# Patient Record
Sex: FEMALE | Race: WHITE | NOT HISPANIC OR LATINO | Employment: OTHER | URBAN - METROPOLITAN AREA
[De-identification: names, ages, dates, MRNs, and addresses within clinical notes are randomized per-mention and may not be internally consistent; named-entity substitution may affect disease eponyms.]

---

## 2017-06-21 DIAGNOSIS — Z01.812 PRE-OPERATIVE LABORATORY EXAMINATION: ICD-10-CM

## 2017-06-21 DIAGNOSIS — I35.0 AORTIC STENOSIS, MILD: ICD-10-CM

## 2017-06-21 DIAGNOSIS — Z01.810 PRE-OPERATIVE CARDIOVASCULAR EXAMINATION: ICD-10-CM

## 2017-06-21 LAB — EKG IMPRESSION: NORMAL

## 2017-06-21 PROCEDURE — 93010 ELECTROCARDIOGRAM REPORT: CPT | Performed by: INTERNAL MEDICINE

## 2017-06-21 PROCEDURE — 85027 COMPLETE CBC AUTOMATED: CPT

## 2017-06-21 PROCEDURE — 93005 ELECTROCARDIOGRAM TRACING: CPT

## 2017-06-21 PROCEDURE — 36415 COLL VENOUS BLD VENIPUNCTURE: CPT

## 2017-06-21 RX ORDER — LANOLIN ALCOHOL/MO/W.PET/CERES
3000 CREAM (GRAM) TOPICAL DAILY
COMMUNITY
End: 2022-06-13

## 2017-06-21 NOTE — OR NURSING
Preadmit appointment:  Patient instructed to continue regularly prescribed medications through day before surgery.  Instructed to take the following medications the  day of surgery with a sip of water per anesthesia protocol: Abilify, Xanax if needed, Nexium.  Pt instructed to bring CPAP day of surgery

## 2017-06-22 LAB
ERYTHROCYTE [DISTWIDTH] IN BLOOD BY AUTOMATED COUNT: 49.5 FL (ref 35.9–50)
HCT VFR BLD AUTO: 39.5 % (ref 37–47)
HGB BLD-MCNC: 12.6 G/DL (ref 12–16)
MCH RBC QN AUTO: 28 PG (ref 27–33)
MCHC RBC AUTO-ENTMCNC: 31.9 G/DL (ref 33.6–35)
MCV RBC AUTO: 87.8 FL (ref 81.4–97.8)
PLATELET # BLD AUTO: 311 K/UL (ref 164–446)
PMV BLD AUTO: 9.3 FL (ref 9–12.9)
RBC # BLD AUTO: 4.5 M/UL (ref 4.2–5.4)
WBC # BLD AUTO: 5 K/UL (ref 4.8–10.8)

## 2017-06-27 ENCOUNTER — HOSPITAL ENCOUNTER (INPATIENT)
Facility: MEDICAL CENTER | Age: 72
LOS: 1 days | DRG: 908 | End: 2017-06-28
Attending: PLASTIC SURGERY | Admitting: PLASTIC SURGERY
Payer: MEDICARE

## 2017-06-27 PROBLEM — C50.919 MALIGNANT NEOPLASM OF BREAST (FEMALE) (HCC): Status: ACTIVE | Noted: 2017-06-27

## 2017-06-27 PROCEDURE — A9270 NON-COVERED ITEM OR SERVICE: HCPCS

## 2017-06-27 PROCEDURE — 0HRU0JZ REPLACEMENT OF LEFT BREAST WITH SYNTHETIC SUBSTITUTE, OPEN APPROACH: ICD-10-PCS | Performed by: PLASTIC SURGERY

## 2017-06-27 PROCEDURE — 501497 HCHG SURGICLIP: Performed by: PLASTIC SURGERY

## 2017-06-27 PROCEDURE — 160039 HCHG SURGERY MINUTES - EA ADDL 1 MIN LEVEL 3: Performed by: PLASTIC SURGERY

## 2017-06-27 PROCEDURE — C1713 ANCHOR/SCREW BN/BN,TIS/BN: HCPCS | Performed by: PLASTIC SURGERY

## 2017-06-27 PROCEDURE — 87070 CULTURE OTHR SPECIMN AEROBIC: CPT

## 2017-06-27 PROCEDURE — 502000 HCHG MISC OR IMPLANTS RC 0278: Performed by: PLASTIC SURGERY

## 2017-06-27 PROCEDURE — 0HRU37Z REPLACEMENT OF LEFT BREAST WITH AUTOLOGOUS TISSUE SUBSTITUTE, PERCUTANEOUS APPROACH: ICD-10-PCS | Performed by: PLASTIC SURGERY

## 2017-06-27 PROCEDURE — 160028 HCHG SURGERY MINUTES - 1ST 30 MINS LEVEL 3: Performed by: PLASTIC SURGERY

## 2017-06-27 PROCEDURE — 160002 HCHG RECOVERY MINUTES (STAT): Performed by: PLASTIC SURGERY

## 2017-06-27 PROCEDURE — 160009 HCHG ANES TIME/MIN: Performed by: PLASTIC SURGERY

## 2017-06-27 PROCEDURE — 700105 HCHG RX REV CODE 258: Performed by: PLASTIC SURGERY

## 2017-06-27 PROCEDURE — 700102 HCHG RX REV CODE 250 W/ 637 OVERRIDE(OP): Performed by: PLASTIC SURGERY

## 2017-06-27 PROCEDURE — 501445 HCHG STAPLER, SKIN DISP: Performed by: PLASTIC SURGERY

## 2017-06-27 PROCEDURE — 87205 SMEAR GRAM STAIN: CPT

## 2017-06-27 PROCEDURE — 500257: Performed by: PLASTIC SURGERY

## 2017-06-27 PROCEDURE — 500389 HCHG DRAIN, RESERVOIR SUCT JP 100CC: Performed by: PLASTIC SURGERY

## 2017-06-27 PROCEDURE — 700101 HCHG RX REV CODE 250

## 2017-06-27 PROCEDURE — 160035 HCHG PACU - 1ST 60 MINS PHASE I: Performed by: PLASTIC SURGERY

## 2017-06-27 PROCEDURE — 700111 HCHG RX REV CODE 636 W/ 250 OVERRIDE (IP)

## 2017-06-27 PROCEDURE — A6223 GAUZE >16<=48 NO W/SAL W/O B: HCPCS | Performed by: PLASTIC SURGERY

## 2017-06-27 PROCEDURE — 770006 HCHG ROOM/CARE - MED/SURG/GYN SEMI*

## 2017-06-27 PROCEDURE — 160003 HCHG RECOVERY MINUTES (EXTENDED) STAT: Performed by: PLASTIC SURGERY

## 2017-06-27 PROCEDURE — 500772 HCHG KIT, MAMMARY FILL FLUID TRANSFER: Performed by: PLASTIC SURGERY

## 2017-06-27 PROCEDURE — 87075 CULTR BACTERIA EXCEPT BLOOD: CPT

## 2017-06-27 PROCEDURE — 500371 HCHG DRAIN, BLAKE 10MM: Performed by: PLASTIC SURGERY

## 2017-06-27 PROCEDURE — 500423 HCHG DRESSING, ABD COMBINE: Performed by: PLASTIC SURGERY

## 2017-06-27 PROCEDURE — 160048 HCHG OR STATISTICAL LEVEL 1-5: Performed by: PLASTIC SURGERY

## 2017-06-27 PROCEDURE — 502575 HCHG PACK, BREAST: Performed by: PLASTIC SURGERY

## 2017-06-27 PROCEDURE — A9270 NON-COVERED ITEM OR SERVICE: HCPCS | Performed by: PLASTIC SURGERY

## 2017-06-27 PROCEDURE — 500122 HCHG BOVIE, BLADE: Performed by: PLASTIC SURGERY

## 2017-06-27 PROCEDURE — 501838 HCHG SUTURE GENERAL: Performed by: PLASTIC SURGERY

## 2017-06-27 PROCEDURE — 700101 HCHG RX REV CODE 250: Performed by: PLASTIC SURGERY

## 2017-06-27 PROCEDURE — 700102 HCHG RX REV CODE 250 W/ 637 OVERRIDE(OP)

## 2017-06-27 PROCEDURE — 500817 HCHG MAMMARY SIZER: Performed by: PLASTIC SURGERY

## 2017-06-27 PROCEDURE — 0HPU0JZ REMOVAL OF SYNTHETIC SUBSTITUTE FROM LEFT BREAST, OPEN APPROACH: ICD-10-PCS | Performed by: PLASTIC SURGERY

## 2017-06-27 DEVICE — IMPLANTABLE DEVICE: Type: IMPLANTABLE DEVICE | Site: BREAST | Status: FUNCTIONAL

## 2017-06-27 RX ORDER — BACITRACIN 50000 [IU]/1
INJECTION, POWDER, FOR SOLUTION INTRAMUSCULAR
Status: DISCONTINUED | OUTPATIENT
Start: 2017-06-27 | End: 2017-06-27 | Stop reason: HOSPADM

## 2017-06-27 RX ORDER — LIDOCAINE HYDROCHLORIDE 10 MG/ML
INJECTION, SOLUTION INFILTRATION; PERINEURAL
Status: COMPLETED
Start: 2017-06-27 | End: 2017-06-27

## 2017-06-27 RX ORDER — DEXTROSE, SODIUM CHLORIDE, SODIUM LACTATE, POTASSIUM CHLORIDE, AND CALCIUM CHLORIDE 5; .6; .31; .03; .02 G/100ML; G/100ML; G/100ML; G/100ML; G/100ML
INJECTION, SOLUTION INTRAVENOUS CONTINUOUS
Status: DISCONTINUED | OUTPATIENT
Start: 2017-06-27 | End: 2017-06-28 | Stop reason: HOSPADM

## 2017-06-27 RX ORDER — OXYCODONE HCL 5 MG/5 ML
SOLUTION, ORAL ORAL
Status: COMPLETED
Start: 2017-06-27 | End: 2017-06-27

## 2017-06-27 RX ORDER — ONDANSETRON 2 MG/ML
4 INJECTION INTRAMUSCULAR; INTRAVENOUS EVERY 4 HOURS PRN
Status: DISCONTINUED | OUTPATIENT
Start: 2017-06-27 | End: 2017-06-28 | Stop reason: HOSPADM

## 2017-06-27 RX ORDER — ZOLPIDEM TARTRATE 5 MG/1
10 TABLET ORAL NIGHTLY PRN
Status: DISCONTINUED | OUTPATIENT
Start: 2017-06-27 | End: 2017-06-28 | Stop reason: HOSPADM

## 2017-06-27 RX ORDER — OXYCODONE AND ACETAMINOPHEN 7.5; 325 MG/1; MG/1
1 TABLET ORAL EVERY 4 HOURS PRN
Status: DISCONTINUED | OUTPATIENT
Start: 2017-06-27 | End: 2017-06-28 | Stop reason: HOSPADM

## 2017-06-27 RX ORDER — BUPIVACAINE HYDROCHLORIDE 5 MG/ML
INJECTION, SOLUTION EPIDURAL; INTRACAUDAL
Status: DISCONTINUED | OUTPATIENT
Start: 2017-06-27 | End: 2017-06-27 | Stop reason: HOSPADM

## 2017-06-27 RX ORDER — SODIUM CHLORIDE, SODIUM LACTATE, POTASSIUM CHLORIDE, CALCIUM CHLORIDE 600; 310; 30; 20 MG/100ML; MG/100ML; MG/100ML; MG/100ML
INJECTION, SOLUTION INTRAVENOUS
Status: DISCONTINUED | OUTPATIENT
Start: 2017-06-27 | End: 2017-06-28

## 2017-06-27 RX ADMIN — LIDOCAINE HYDROCHLORIDE 0.2 ML: 10 INJECTION, SOLUTION INFILTRATION; PERINEURAL at 11:15

## 2017-06-27 RX ADMIN — OXYCODONE HYDROCHLORIDE AND ACETAMINOPHEN 1 TABLET: 7.5; 325 TABLET ORAL at 21:47

## 2017-06-27 RX ADMIN — SODIUM CHLORIDE, SODIUM LACTATE, POTASSIUM CHLORIDE, CALCIUM CHLORIDE: 600; 310; 30; 20 INJECTION, SOLUTION INTRAVENOUS at 11:20

## 2017-06-27 RX ADMIN — DOXYCYCLINE 100 MG: 100 INJECTION, POWDER, LYOPHILIZED, FOR SOLUTION INTRAVENOUS at 20:26

## 2017-06-27 RX ADMIN — SODIUM CHLORIDE, SODIUM LACTATE, POTASSIUM CHLORIDE, CALCIUM CHLORIDE AND DEXTROSE MONOHYDRATE: 5; 600; 310; 30; 20 INJECTION, SOLUTION INTRAVENOUS at 17:32

## 2017-06-27 RX ADMIN — LIDOCAINE HYDROCHLORIDE 0.2 ML: 10 INJECTION, SOLUTION INFILTRATION; PERINEURAL at 11:20

## 2017-06-27 RX ADMIN — ZOLPIDEM TARTRATE 10 MG: 5 TABLET ORAL at 21:47

## 2017-06-27 RX ADMIN — OXYCODONE HYDROCHLORIDE 10 MG: 5 SOLUTION ORAL at 14:30

## 2017-06-27 RX ADMIN — LIDOCAINE HYDROCHLORIDE 0.2 ML: 10 INJECTION, SOLUTION INFILTRATION; PERINEURAL at 11:16

## 2017-06-27 ASSESSMENT — PAIN SCALES - GENERAL
PAINLEVEL_OUTOF10: 2
PAINLEVEL_OUTOF10: 6
PAINLEVEL_OUTOF10: 4
PAINLEVEL_OUTOF10: 3
PAINLEVEL_OUTOF10: 4
PAINLEVEL_OUTOF10: 3
PAINLEVEL_OUTOF10: 5
PAINLEVEL_OUTOF10: 0
PAINLEVEL_OUTOF10: ASSUMED PAIN PRESENT
PAINLEVEL_OUTOF10: 0
PAINLEVEL_OUTOF10: 4
PAINLEVEL_OUTOF10: 4

## 2017-06-27 NOTE — IP AVS SNAPSHOT
" Home Care Instructions                                                                                                                  Name:Tiffany Louis  Medical Record Number:[id number]  CSN: [id number]    YOB: 1945   Age: 72 y.o.  Sex: female  HT:1.626 m (5' 4\") WT: 64.5 kg (142 lb 3.2 oz)          Admit Date: 6/27/2017     Discharge Date:   Today's Date: 6/28/2017  Attending Doctor:  Johnny Flannery M.D.                  Allergies:  Clindamycin; Levaquin; Penicillins; Ampicillin; and Demerol            Discharge Instructions       Strip drains every 4 hours.  Keep dressing intact  Follow up appointment 6/30    Discharge Instructions    Discharged to home by car with relative. Discharged via wheelchair, hospital escort: Yes.  Special equipment needed: Not Applicable    Be sure to schedule a follow-up appointment with your primary care doctor or any specialists as instructed.     Discharge Plan:   Diet Plan: Discussed  Activity Level: Discussed  Confirmed Follow up Appointment: Patient to Call and Schedule Appointment  Confirmed Symptoms Management: Discussed  Medication Reconciliation Updated: Yes  Influenza Vaccine Indication: Patient Refuses    I understand that a diet low in cholesterol, fat, and sodium is recommended for good health. Unless I have been given specific instructions below for another diet, I accept this instruction as my diet prescription.   Other diet: Regular    Special Instructions: None    · Is patient discharged on Warfarin / Coumadin?   No     · Is patient Post Blood Transfusion?  No    Depression / Suicide Risk    As you are discharged from this [facility] facility, it is important to learn how to keep safe from harming yourself.    Recognize the warning signs:  · Abrupt changes in personality, positive or negative- including increase in energy   · Giving away possessions  · Change in eating patterns- significant weight changes-  positive or negative  · Change in sleeping " patterns- unable to sleep or sleeping all the time   · Unwillingness or inability to communicate  · Depression  · Unusual sadness, discouragement and loneliness  · Talk of wanting to die  · Neglect of personal appearance   · Rebelliousness- reckless behavior  · Withdrawal from people/activities they love  · Confusion- inability to concentrate     If you or a loved one observes any of these behaviors or has concerns about self-harm, here's what you can do:  · Talk about it- your feelings and reasons for harming yourself  · Remove any means that you might use to hurt yourself (examples: pills, rope, extension cords, firearm)  · Get professional help from the community (Mental Health, Substance Abuse, psychological counseling)  · Do not be alone:Call your Safe Contact- someone whom you trust who will be there for you.  · Call your local CRISIS HOTLINE 072-8626 or 047-622-8160  · Call your local Children's Mobile Crisis Response Team [place] (291) 491-9717 or www.MIOX  · Call the toll free National Suicide Prevention Hotlines   · National Suicide Prevention Lifeline 967-797-FPUE (7379)  · National Hope Line Network 800-SUICIDE (437-4384)        Follow-up Information     1. Follow up with Johnny Flannery M.D. In 1 week.    Specialty:  Plastic Surgery    Contact information    [address] [place] [address] [phone]           Discharge Medication Instructions:    Below are the medications your physician expects you to take upon discharge:    Review all your home medications and newly ordered medications with your doctor and/or pharmacist. Follow medication instructions as directed by your doctor and/or pharmacist.    Please keep your medication list with you and share with your physician.               Medication List      CONTINUE taking these medications        Instructions    Morning Afternoon Evening Bedtime    ABILIFY 5 MG tablet   Generic drug:  aripiprazole        Take 5 mg by mouth every  morning. Indications: Major Depressive Disorder   Dose:  5 mg                        alprazolam 0.5 MG Tabs   Commonly known as:  XANAX        Take 0.5 mg by mouth as needed for Anxiety. Indications: Feeling Anxious   Dose:  0.5 mg                        AMBIEN CR 12.5 MG CR tablet   Generic drug:  zolpidem        Take 12.5 mg by mouth at bedtime as needed for Sleep. Indications: Trouble Sleeping   Dose:  12.5 mg                        aspirin 81 MG tablet        Take 81 mg by mouth every day.   Dose:  81 mg                        CELEBREX 200 MG Caps   Generic drug:  celecoxib        Take 200 mg by mouth. Takes 3x per week on Mon, Wed, Fri  Indications: Joint Damage causing Pain and Loss of Function   Dose:  200 mg                        COQ10 PO        Take 300 mg by mouth every day.   Dose:  300 mg                        ferrous gluconate 324 (38 FE) MG Tabs   Commonly known as:  FERGON        Take 324 mg by mouth every morning with breakfast.   Dose:  324 mg                        FLONASE 50 MCG/ACT nasal spray   Generic drug:  fluticasone        Spray 1 Spray in nose as needed. Indications: Hayfever   Dose:  1 Spray                        MULTIVITAMIN ADULT PO        Take  by mouth every day.                        NEXIUM 40 MG delayed-release capsule   Generic drug:  esomeprazole        Take 40 mg by mouth every morning. Indications: Gastroesophageal Reflux Disease with Current Symptoms   Dose:  40 mg                        PRISTIQ 100 MG Tb24   Generic drug:  Desvenlafaxine Succinate        Take 1 Tab by mouth every morning. Indications: Major Depressive Disorder   Dose:  1 Tab                        PROBIOTIC DAILY PO        Take 1 Cap by mouth every day.   Dose:  1 Cap                        vitamin B-12 1000 MCG Tabs        Take 2,000 mcg by mouth every day.   Dose:  2000 mcg                          ASK your doctor about these medications        Instructions    Morning Afternoon Evening Bedtime     acetaminophen 500 MG Tabs   Commonly known as:  TYLENOL        Take 500-1,000 mg by mouth every 6 hours as needed.   Dose:  500-1000 mg                                Instructions           Diet / Nutrition:    Follow any diet instructions given to you by your doctor or the dietician, including how much salt (sodium) you are allowed each day.    If you are overweight, talk to your doctor about a weight reduction plan.    Activity:    Remain physically active following your doctor's instructions about exercise and activity.    Rest often.     Any time you become even a little tired or short of breath, SIT DOWN and rest.    Worsening Symptoms:    Report any of the following signs and symptoms to the doctor's office immediately:    *Pain of jaw, arm, or neck  *Chest pain not relieved by medication                               *Dizziness or loss of consciousness  *Difficulty breathing even when at rest   *More tired than usual                                       *Bleeding drainage or swelling of surgical site  *Swelling of feet, ankles, legs or stomach                 *Fever (>100ºF)  *Pink or blood tinged sputum  *Weight gain (3lbs/day or 5lbs /week)           *Shock from internal defibrillator (if applicable)  *Palpitations or irregular heartbeats                *Cool and/or numb extremities    Stroke Awareness    Common Risk Factors for Stroke include:    Age  Atrial Fibrillation  Carotid Artery Stenosis  Diabetes Mellitus  Excessive alcohol consumption  High blood pressure  Overweight   Physical inactivity  Smoking    Warning signs and symptoms of a stroke include:    *Sudden numbness or weakness of the face, arm or leg (especially on one side of the body).  *Sudden confusion, trouble speaking or understanding.  *Sudden trouble seeing in one or both eyes.  *Sudden trouble walking, dizziness, loss of balance or coordination.Sudden severe headache with no known cause.    It is very important to get treatment  quickly when a stroke occurs. If you experience any of the above warning signs, call 911 immediately.                   Disclaimer         Quit Smoking / Tobacco Use:    I understand the use of any tobacco products increases my chance of suffering from future heart disease or stroke and could cause other illnesses which may shorten my life. Quitting the use of tobacco products is the single most important thing I can do to improve my health. For further information on smoking / tobacco cessation call a Toll Free Quit Line at 1-887.981.1828 (*[employer] [place]) or 1-428.439.9765 (American Lung Association) or you can access the web based program at www.lungusa.org.    [place] Tobacco Users Help Line:  (228) 364-5014       Toll Free: 1-782.614.2812  Quit Tobacco Program [facility] (019)796-3416    Crisis Hotline:    [place] Crisis Hotline:  9-749-LINOYBQ or 1-602.440.6532    [place] Crisis Hotline:    1-755.104.4814 or 708-198-3724    Discharge Survey:   Thank you for choosing [facility]. We hope we did everything we could to make your hospital stay a pleasant one. You may be receiving a phone survey and we would appreciate your time and participation in answering the questions. Your input is very valuable to us in our efforts to improve our service to our patients and their families.        My signature on this form indicates that:    1. I have reviewed and understand the above information.  2. My questions regarding this information have been answered to my satisfaction.  3. I have formulated a plan with my discharge nurse to obtain my prescribed medications for home.                  Disclaimer         __________________________________                     __________       ________                       Patient Signature                                                 Date                    Time

## 2017-06-27 NOTE — IP AVS SNAPSHOT
6/28/2017    Tiffany Bautista [place]fluc[place]  [address]  [place] [address]    Dear Tiffany:    [facility] wants to ensure your discharge home is safe and you or your loved ones have had all of your questions answered regarding your care after you leave the hospital.    Below is a list of resources and contact information should you have any questions regarding your hospital stay, follow-up instructions, or active medical symptoms.    Questions or Concerns Regarding… Contact   Medical Questions Related to Your Discharge  (7 days a week, 8am-5pm) Contact a Nurse Care Coordinator   [phone]   Medical Questions Not Related to Your Discharge  (24 hours a day / 7 days a week)  Contact the Nurse Health Line   [phone]    Medications or Discharge Instructions Refer to your discharge packet   or contact your [facility] Primary Care Provider   [phone]   Follow-up Appointment(s) Schedule your appointment via [employer]   or contact Scheduling [phone]   Billing Review your statement via [employer]  or contact Billing [phone]   Medical Records Review your records via [employer]   or contact Medical Records [phone]     You may receive a telephone call within two days of discharge. This call is to make certain you understand your discharge instructions and have the opportunity to have any questions answered. You can also easily access your medical information, test results and upcoming appointments via the [employer] free online health management tool. You can learn more and sign up at [employer]. For assistance setting up your [employer] account, please call [phone].    Once again, we want to ensure your discharge home is safe and that you have a clear understanding of any next steps in your care. If you have any questions or concerns, please do not hesitate to contact us, we are here for you. Thank you for choosing [facility] for your healthcare needs.    Sincerely,    Your [facility] Healthcare Team

## 2017-06-27 NOTE — OR SURGEON
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    Anesthesiologist/Type of Anesthesia:  Anesthesiologist: Yves Brown M.D.  Anesthesia Technician: Zonia Voss/General    Surgical Staff:  Circulator: Anali Grant R.N.  Scrub Person: Anurag Mendez    Specimen: culture of capsule fluid    Estimated Blood Loss: 200 ml    Findings: N/A    Complications: none      6/27/2017 2:15 PM Johnny Flannery

## 2017-06-27 NOTE — IP AVS SNAPSHOT
" <p align=\"LEFT\"><IMG SRC=\"//[email or web address]\" alt=\"Image\" WIDTH=\"50%\" HEIGHT=\"200\" BORDER=\"\"></p>                   Name:Tiffany Louis  Medical Record Number:[id number]  CSN: [id number]    YOB: 1945   Age: 72 y.o.  Sex: female  HT:1.626 m (5' 4\") WT: 64.5 kg (142 lb 3.2 oz)          Admit Date: 6/27/2017     Discharge Date:   Today's Date: 6/28/2017  Attending Doctor:  Johnny Flannery M.D.                  Allergies:  Clindamycin; Levaquin; Penicillins; Ampicillin; and Demerol          Follow-up Information     1. Follow up with Johnny Flannery M.D. In 1 week.    Specialty:  Plastic Surgery    Contact information    [address] [place], [place] [address]ill [place]  [place] [address]  [phone]           Medication List      Take these Medications        Instructions    ABILIFY 5 MG tablet   Generic drug:  aripiprazole    Take 5 mg by mouth every morning. Indications: Major Depressive Disorder   Dose:  5 mg       alprazolam 0.5 MG Tabs   Commonly known as:  XANAX    Take 0.5 mg by mouth as needed for Anxiety. Indications: Feeling Anxious   Dose:  0.5 mg       AMBIEN CR 12.5 MG CR tablet   Generic drug:  zolpidem    Take 12.5 mg by mouth at bedtime as needed for Sleep. Indications: Trouble Sleeping   Dose:  12.5 mg       aspirin 81 MG tablet    Take 81 mg by mouth every day.   Dose:  81 mg       CELEBREX 200 MG Caps   Generic drug:  celecoxib    Take 200 mg by mouth. Takes 3x per week on Mon, Wed, Fri  Indications: Joint Damage causing Pain and Loss of Function   Dose:  200 mg       COQ10 PO    Take 300 mg by mouth every day.   Dose:  300 mg       ferrous gluconate 324 (38 FE) MG Tabs   Commonly known as:  FERGON    Take 324 mg by mouth every morning with breakfast.   Dose:  324 mg       FLONASE 50 MCG/ACT nasal spray   Generic drug:  fluticasone    Spray 1 Spray in nose as needed. Indications: Hayfever   Dose:  1 Spray       MULTIVITAMIN ADULT PO    Take  by mouth every day.       NEXIUM 40 MG delayed-release capsule   "   Generic drug:  esomeprazole    Take 40 mg by mouth every morning. Indications: Gastroesophageal Reflux Disease with Current Symptoms   Dose:  40 mg       PRISTIQ 100 MG Tb24   Generic drug:  Desvenlafaxine Succinate    Take 1 Tab by mouth every morning. Indications: Major Depressive Disorder   Dose:  1 Tab       PROBIOTIC DAILY PO    Take 1 Cap by mouth every day.   Dose:  1 Cap       vitamin B-12 1000 MCG Tabs    Take 2,000 mcg by mouth every day.   Dose:  2000 mcg         Ask your Physician about these medications        Instructions    acetaminophen 500 MG Tabs   Commonly known as:  TYLENOL    Take 500-1,000 mg by mouth every 6 hours as needed.   Dose:  500-1000 mg

## 2017-06-27 NOTE — OR NURSING
1414: To PACU from OR via bed, very drowsy, denies pain and nausea, pt's own nasal CPAP applied, respirations spontaneous and non-labored. Post-op bra insitu over c/d/i breast dressings, ESTUARDO x 4 to be compressed at 1430 per surgeon request. Plan to keep pt in PACU for full hour per [facility]AN protocol.  1425: More awake, c/o pain  - plan analgesia, pt placed on bedpan for urge to void.  1430: ESTUARDO drains x 4 compressed and commenced draining hemoserous fluid.   1440: Pain decreasing.  1455: Small void, pain level tolerable to pt  1510: No change  1514: Meets criteria to transfer to G[facility]. No change in surgical site assessment.

## 2017-06-28 VITALS
SYSTOLIC BLOOD PRESSURE: 127 MMHG | OXYGEN SATURATION: 95 % | WEIGHT: 142.2 LBS | TEMPERATURE: 97.8 F | HEIGHT: 64 IN | RESPIRATION RATE: 18 BRPM | BODY MASS INDEX: 24.28 KG/M2 | DIASTOLIC BLOOD PRESSURE: 46 MMHG | HEART RATE: 65 BPM

## 2017-06-28 LAB
BASOPHILS # BLD AUTO: 0.3 % (ref 0–1.8)
BASOPHILS # BLD: 0.02 K/UL (ref 0–0.12)
EOSINOPHIL # BLD AUTO: 0.02 K/UL (ref 0–0.51)
EOSINOPHIL NFR BLD: 0.3 % (ref 0–6.9)
ERYTHROCYTE [DISTWIDTH] IN BLOOD BY AUTOMATED COUNT: 49 FL (ref 35.9–50)
GRAM STN SPEC: NORMAL
HCT VFR BLD AUTO: 33.1 % (ref 37–47)
HGB BLD-MCNC: 11.2 G/DL (ref 12–16)
IMM GRANULOCYTES # BLD AUTO: 0.03 K/UL (ref 0–0.11)
IMM GRANULOCYTES NFR BLD AUTO: 0.4 % (ref 0–0.9)
LYMPHOCYTES # BLD AUTO: 1.05 K/UL (ref 1–4.8)
LYMPHOCYTES NFR BLD: 13.2 % (ref 22–41)
MCH RBC QN AUTO: 29.6 PG (ref 27–33)
MCHC RBC AUTO-ENTMCNC: 33.8 G/DL (ref 33.6–35)
MCV RBC AUTO: 87.3 FL (ref 81.4–97.8)
MONOCYTES # BLD AUTO: 0.85 K/UL (ref 0–0.85)
MONOCYTES NFR BLD AUTO: 10.7 % (ref 0–13.4)
NEUTROPHILS # BLD AUTO: 5.97 K/UL (ref 2–7.15)
NEUTROPHILS NFR BLD: 75.1 % (ref 44–72)
NRBC # BLD AUTO: 0 K/UL
NRBC BLD AUTO-RTO: 0 /100 WBC
PLATELET # BLD AUTO: 239 K/UL (ref 164–446)
PMV BLD AUTO: 8.9 FL (ref 9–12.9)
RBC # BLD AUTO: 3.79 M/UL (ref 4.2–5.4)
SIGNIFICANT IND 70042: NORMAL
SITE SITE: NORMAL
SOURCE SOURCE: NORMAL
WBC # BLD AUTO: 7.9 K/UL (ref 4.8–10.8)

## 2017-06-28 PROCEDURE — 700101 HCHG RX REV CODE 250: Performed by: PLASTIC SURGERY

## 2017-06-28 PROCEDURE — 36415 COLL VENOUS BLD VENIPUNCTURE: CPT

## 2017-06-28 PROCEDURE — 85025 COMPLETE CBC W/AUTO DIFF WBC: CPT

## 2017-06-28 PROCEDURE — 700105 HCHG RX REV CODE 258: Performed by: PLASTIC SURGERY

## 2017-06-28 RX ADMIN — OXYCODONE HYDROCHLORIDE AND ACETAMINOPHEN 1 TABLET: 7.5; 325 TABLET ORAL at 10:01

## 2017-06-28 RX ADMIN — DOXYCYCLINE 100 MG: 100 INJECTION, POWDER, LYOPHILIZED, FOR SOLUTION INTRAVENOUS at 08:24

## 2017-06-28 RX ADMIN — OXYCODONE HYDROCHLORIDE AND ACETAMINOPHEN 1 TABLET: 7.5; 325 TABLET ORAL at 05:37

## 2017-06-28 ASSESSMENT — PAIN SCALES - GENERAL
PAINLEVEL_OUTOF10: 5
PAINLEVEL_OUTOF10: 4

## 2017-06-28 NOTE — PROGRESS NOTES
POD #1    VSS afebrile    Breast  Flap with good color  and cap refill .  Back soft.    ESTUARDO s draining serosang    Hgb = 11.2    Home today  F/U 6/ 30

## 2017-06-28 NOTE — PROGRESS NOTES
Received report from night RN. Assumed pt care.  Pt is alert and oriented.  Pain tolerable at this time.  ESTUARDO drains x4 noted.  CPAP on.   Updated pt w/ POC.  Will continue with care.

## 2017-06-28 NOTE — PROGRESS NOTES
PRN pain medication given - see MAR. ESTUARDO drains x4 milked. Pt denies any further needs at this time. Hourly rounds continue.

## 2017-06-28 NOTE — CARE PLAN
Problem: Venous Thromboembolism (VTW)/Deep Vein Thrombosis (DVT) Prevention:  Goal: Patient will participate in Venous Thrombosis (VTE)/Deep Vein Thrombosis (DVT)Prevention Measures    06/27/17 2010   OTHER   Risk Assessment Score 4   VTE RISK High   Mechanical Prophylaxis SCDs, Sequentials (Intermittent Pneumatic Compression Devices)         Problem: Pain Management  Goal: Pain level will decrease to patient’s comfort goal  PRN percocot given - see MAR

## 2017-06-28 NOTE — PROGRESS NOTES
Discharging patient home per MD order.  Pt demonstrated understanding of discharge instructions, follow up appointments, home medications, prescriptions, and home care for surgical drains. Pt is voiding without difficulty, pain well controlled, tolerating oral medications, O2 greater than 90%.  Pt verbalized understanding of discharge instructions and educational handouts and all questions answered.  Pt discharged off unit with hospital escort.

## 2017-06-28 NOTE — PROGRESS NOTES
Pt to floor via PACU.  A&0x4. Conversing w/ nursing staff. Educated on POC. Call light within reach- will continue to monitor.

## 2017-06-28 NOTE — PROGRESS NOTES
Pt asking about sleeping medication. No PRN's ordered. Pt reports that she takes ambien at home nightly. RN called & spoke with Dr. Flannery regarding ambien. New order received - 10mg ambien. Pt updated regarding new order received. Scheduled abx given - see MAR. ESTUARDO drains x4 milked. Denies any further needs at this time. Hourly rounds continue.

## 2017-06-28 NOTE — DISCHARGE INSTRUCTIONS
Strip drains every 4 hours.  Keep dressing intact  Follow up appointment 6/30    Discharge Instructions    Discharged to home by car with relative. Discharged via wheelchair, hospital escort: Yes.  Special equipment needed: Not Applicable    Be sure to schedule a follow-up appointment with your primary care doctor or any specialists as instructed.     Discharge Plan:   Diet Plan: Discussed  Activity Level: Discussed  Confirmed Follow up Appointment: Patient to Call and Schedule Appointment  Confirmed Symptoms Management: Discussed  Medication Reconciliation Updated: Yes  Influenza Vaccine Indication: Patient Refuses    I understand that a diet low in cholesterol, fat, and sodium is recommended for good health. Unless I have been given specific instructions below for another diet, I accept this instruction as my diet prescription.   Other diet: Regular    Special Instructions: None    · Is patient discharged on Warfarin / Coumadin?   No     · Is patient Post Blood Transfusion?  No    Depression / Suicide Risk    As you are discharged from this [facility] facility, it is important to learn how to keep safe from harming yourself.    Recognize the warning signs:  · Abrupt changes in personality, positive or negative- including increase in energy   · Giving away possessions  · Change in eating patterns- significant weight changes-  positive or negative  · Change in sleeping patterns- unable to sleep or sleeping all the time   · Unwillingness or inability to communicate  · Depression  · Unusual sadness, discouragement and loneliness  · Talk of wanting to die  · Neglect of personal appearance   · Rebelliousness- reckless behavior  · Withdrawal from people/activities they love  · Confusion- inability to concentrate     If you or a loved one observes any of these behaviors or has concerns about self-harm, here's what you can do:  · Talk about it- your feelings and reasons for harming yourself  · Remove any means that you  might use to hurt yourself (examples: pills, rope, extension cords, firearm)  · Get professional help from the community (Mental Health, Substance Abuse, psychological counseling)  · Do not be alone:Call your Safe Contact- someone whom you trust who will be there for you.  · Call your local CRISIS HOTLINE 910-5703 or 172-878-7245  · Call your local Children's Mobile Crisis Response Team [place] (885) 504-9642 or www.[employer]ili[employer]  · Call the toll free National Suicide Prevention Hotlines   · National Suicide Prevention Lifeline 152-398-GUKU (4842)  · National Hope Line Network 800-SUICIDE (638-8323)

## 2017-06-28 NOTE — PROGRESS NOTES
2 RN skin assessment done; skin not WDL. See Wound flowsheet. Pt had breast reconstruction surgery.

## 2017-06-30 LAB
BACTERIA WND AEROBE CULT: NORMAL
GRAM STN SPEC: NORMAL
SIGNIFICANT IND 70042: NORMAL
SITE SITE: NORMAL
SOURCE SOURCE: NORMAL

## 2017-07-02 LAB
BACTERIA SPEC ANAEROBE CULT: NORMAL
SIGNIFICANT IND 70042: NORMAL
SITE SITE: NORMAL
SOURCE SOURCE: NORMAL

## 2017-08-15 ENCOUNTER — HOSPITAL ENCOUNTER (OUTPATIENT)
Dept: RADIOLOGY | Facility: MEDICAL CENTER | Age: 72
End: 2017-08-15
Attending: SPECIALIST
Payer: MEDICARE

## 2017-08-15 DIAGNOSIS — C50.912 MALIGNANT NEOPLASM OF LEFT FEMALE BREAST, UNSPECIFIED SITE OF BREAST: ICD-10-CM

## 2017-08-15 PROCEDURE — 700117 HCHG RX CONTRAST REV CODE 255: Performed by: SPECIALIST

## 2017-08-15 PROCEDURE — 71260 CT THORAX DX C+: CPT

## 2017-08-15 RX ADMIN — IOHEXOL 100 ML: 350 INJECTION, SOLUTION INTRAVENOUS at 14:55

## 2017-09-01 ENCOUNTER — HOSPITAL ENCOUNTER (OUTPATIENT)
Dept: RADIATION ONCOLOGY | Facility: MEDICAL CENTER | Age: 72
End: 2017-09-30
Attending: RADIOLOGY
Payer: MEDICARE

## 2017-09-01 VITALS
SYSTOLIC BLOOD PRESSURE: 133 MMHG | TEMPERATURE: 99.4 F | OXYGEN SATURATION: 97 % | WEIGHT: 142.1 LBS | BODY MASS INDEX: 24.39 KG/M2 | RESPIRATION RATE: 20 BRPM | HEART RATE: 87 BPM | DIASTOLIC BLOOD PRESSURE: 79 MMHG

## 2017-09-01 DIAGNOSIS — Z63.9 FAMILY CIRCUMSTANCE: ICD-10-CM

## 2017-09-01 PROCEDURE — 99213 OFFICE O/P EST LOW 20 MIN: CPT | Performed by: RADIOLOGY

## 2017-09-01 PROCEDURE — 99212 OFFICE O/P EST SF 10 MIN: CPT | Performed by: RADIOLOGY

## 2017-09-01 SDOH — SOCIAL STABILITY - SOCIAL INSECURITY: PROBLEM RELATED TO PRIMARY SUPPORT GROUP, UNSPECIFIED: Z63.9

## 2017-09-01 ASSESSMENT — PAIN SCALES - GENERAL: PAINLEVEL: NO PAIN

## 2017-09-01 NOTE — PROGRESS NOTES
RADIATION ONCOLOGY FOLLOW-UP    DATE OF SERVICE: 9/1/2017    IDENTIFICATION:   A 72 y.o. female with stage III, T2 N2, ER/NC negative Her2 triple positive left breast cancer status post bilateral mastectomies with immediate reconstruction TCH chemotherapy. She had developed a wound dehiscence in the contralateral breast. She had new implants 11 2016. And then since that time both the left and the right breast have been adjusted in the latissimus flap has been done on both sides.    HISTORY OF PRESENT ILLNESS:   Patient is doing well from all her surgeries she had a stage work with Dr. Muir in August. A CT scan of the chest abdomen pelvis 8/15/2017 showed stable tiny low density liver lesion likely cyst and no other metastatic disease. She's feeling good and essentially without complaints    CURRENT MEDICATIONS:  Current Outpatient Prescriptions   Medication Sig Dispense Refill   • Coenzyme Q10 (COQ10 PO) Take 300 mg by mouth every day.     • Cyanocobalamin (VITAMIN B-12) 1000 MCG Tab Take 2,000 mcg by mouth every day.     • Multiple Vitamins-Minerals (MULTIVITAMIN ADULT PO) Take  by mouth every day.     • aspirin 81 MG tablet Take 81 mg by mouth every day.     • ferrous gluconate (FERGON) 324 (38 FE) MG Tab Take 324 mg by mouth every morning with breakfast.     • acetaminophen (TYLENOL) 500 MG Tab Take 500-1,000 mg by mouth every 6 hours as needed.     • aripiprazole (ABILIFY) 5 MG tablet Take 5 mg by mouth every morning. Indications: Major Depressive Disorder     • zolpidem (AMBIEN CR) 12.5 MG CR tablet Take 12.5 mg by mouth at bedtime as needed for Sleep. Indications: Trouble Sleeping     • Desvenlafaxine Succinate (PRISTIQ) 100 MG TB24 Take 1 Tab by mouth every morning. Indications: Major Depressive Disorder     • esomeprazole (NEXIUM) 40 MG delayed-release capsule Take 40 mg by mouth every morning. Indications: Gastroesophageal Reflux Disease with Current Symptoms     • alprazolam (XANAX) 0.5 MG TABS Take 0.5  mg by mouth as needed for Anxiety. Indications: Feeling Anxious     • Probiotic Product (PROBIOTIC DAILY PO) Take 1 Cap by mouth every day.     • CELEBREX 200 MG PO CAPS Take 200 mg by mouth. Takes 3x per week on Mon, Wed, Fri  Indications: Joint Damage causing Pain and Loss of Function     • FLONASE 50 MCG/ACT NA SUSP Spray 1 Spray in nose as needed. Indications: Hayfever       No current facility-administered medications for this encounter.        ALLERGIES:  Clindamycin; Levaquin; Penicillins; Ampicillin; and Demerol    FAMILY HISTORY:    No [facility] history on file.[facility]        SOCIAL HISTORY:     reports that she has never smoked. She has never used smokeless tobacco. She reports that she drinks alcohol. She reports that she does not use drugs.    REVIEW OF SYSTEMS:   Constitutional ROS: No unexpected change in weight, No weakness, No fatigue, No unexplained fevers, sweats, or chills  Eye ROS: No recent significant change in vision, No eye pain, redness, discharge  Ear ROS: No ear pain, No drainage, No tinnitus or vertigo, No recent change in hearing  Mouth/Throat ROS: No teeth or gum problems, No bleeding gums, No tongue complaints, No sore throat  Neck ROS: No lumps or masses, No swollen glands, No recent swelling in thyroid area, No significant pain in neck  Pulmonary ROS: No chronic cough, sputum, or hemoptysis, No dyspnea on exertion, No wheezing, No shortness of breath  Cardiovascular ROS: No exercise intolerance, No chest pain, No shortness of breath, No dyspnea on exertion  Gastrointestinal ROS: No abdominal pain, No change in bowel habits, No significant change in appetite, No nausea, vomiting, diarrhea, or constipation, No hematemesis, No abdominal bloating or early satiety  Breast ROS: No new breast lumps or masses, No severe breast pain recent surgeries on both reconstructed breasts  Musculoskeletal/Extremities ROS: No clubbing, No cyanosis, No peripheral edema  Hematologic/Lymphatic ROS: No  coagulation disorder, No anemia, No abnormal bleeding  Skin/Integumentary ROS: color, texture and temperature normal, mobility and turgor normal, No evidence of bleeding or bruising, No abnormal skin lesions noted  Neurologic ROS: No chronic headaches, No seizures, No weakness  Psychiatric ROS: As both depression and anxiety and is treated for this medically        PHYSICAL EXAM:    Vitals:    09/01/17 1306   BP: 133/79   Pulse: 87   Resp: 20   Temp: 37.4 °C (99.4 °F)   SpO2: 97%   Weight: 64.5 kg (142 lb 1.6 oz)   Pain Score: No pain        GENERAL:Well-appearing alert and oriented ×3 in no apparent distress  Reconstructed breasts: Bilaterally symmetrical, large she has recent surgeries on both of them. There is no palpable nodularity or mass  HEENT:  Pupils are equal, round, and reactive to light.  Extraocular muscles   are intact. Sclerae nonicteric.  Conjunctivae pink.  Oral cavity, tongue   protrudes midline.   NECK:  Supple without evidence of thyromegaly.  NODES:  No peripheral adenopathy of the neck, supraclavicular fossa or axillae   bilaterally.  LUNGS:  Clear to ascultation  HEART:  Regular rate and rhythm.  No murmur appreciated  ABDOMEN:  Soft. No evidence of hepatosplenomegaly.    EXTREMITIES:  Without Edema.  NEUROLOGIC:  Cranial nerves II through XII were intact. Normal stance and gait. Motor and sensory grossly within normal limits.    ECOG PERFORMANCE STATUS:  1= Restricted in physically strenuous activity, but ambulatory and able to carry out work of a light sedentary nature, e.g., light housework, office work.    LABORATORY DATA:   Lab Results   Component Value Date/Time    SODIUM 136 01/06/2016 02:55 PM    POTASSIUM 3.9 01/06/2016 02:55 PM    CHLORIDE 104 01/06/2016 02:55 PM    CO2 27 01/06/2016 02:55 PM    GLUCOSE 98 01/06/2016 02:55 PM    BUN 9 01/06/2016 02:55 PM    CREATININE 0.80 01/06/2016 02:55 PM    CREATININE 1.1 03/17/2008 03:05 PM     Lab Results   Component Value Date/Time     ALKPHOSPHAT 128 (H) 01/06/2016 02:55 PM    ASTSGOT 14 01/06/2016 02:55 PM    ALTSGPT 16 01/06/2016 02:55 PM    TBILIRUBIN 0.4 01/06/2016 02:55 PM      Lab Results   Component Value Date/Time    WBC 7.9 06/28/2017 04:48 AM    RBC 3.79 (L) 06/28/2017 04:48 AM    HEMOGLOBIN 11.2 (L) 06/28/2017 04:48 AM    HEMATOCRIT 33.1 (L) 06/28/2017 04:48 AM    MCV 87.3 06/28/2017 04:48 AM    MCH 29.6 06/28/2017 04:48 AM    MCHC 33.8 06/28/2017 04:48 AM    MPV 8.9 (L) 06/28/2017 04:48 AM    NEUTSPOLYS 75.10 (H) 06/28/2017 04:48 AM    LYMPHOCYTES 13.20 (L) 06/28/2017 04:48 AM    MONOCYTES 10.70 06/28/2017 04:48 AM    EOSINOPHILS 0.30 06/28/2017 04:48 AM    BASOPHILS 0.30 06/28/2017 04:48 AM    HYPOCHROMIA 1+ 03/31/2005 12:00 PM    ANISOCYTOSIS 1+ 01/06/2016 02:55 PM          IMPRESSION:    A 72 y.o. withStage III ER/NM negative Her2 triple positive breast cancer status post bilateral mastectomies and radiation therapy to the left chest wall and draining lymphatics, clinically MANDY.    RECOMMENDATIONS:   Since she is 2 years out from her bilateral mastectomies, I think it is reasonable for Dr. Brothers to continue her follow-up and I will see her on an as-needed basis.    .      Thank you for the opportunity to participate in her care.  If any questions or comments, please do not hesitate in calling.      Please note that this dictation was created using voice recognition software. I have made every reasonable attempt to correct obvious errors, but I expect that there are errors of grammar and possibly content that I did not discover before finalizing the note.

## 2017-09-01 NOTE — NON-PROVIDER
Patient was seen today in clinic with Dr. Perez for breast follow up.  Vitals signs and weight were obtained and pain assessment was completed.  Allergies and medications were reviewed with the patient.  Review of systems completed.     Vitals/Pain:  Vitals:    09/01/17 1306   BP: 133/79   Pulse: 87   Resp: 20   Temp: 37.4 °C (99.4 °F)   SpO2: 97%   Weight: 64.5 kg (142 lb 1.6 oz)   Pain Score: No pain              Allergies:   Clindamycin; Levaquin; Penicillins; Ampicillin; and Demerol    Current Medications:    Current Outpatient Prescriptions:   •  Coenzyme Q10 (COQ10 PO), Take 300 mg by mouth every day., Disp: , Rfl:   •  Cyanocobalamin (VITAMIN B-12) 1000 MCG Tab, Take 2,000 mcg by mouth every day., Disp: , Rfl:   •  Multiple Vitamins-Minerals (MULTIVITAMIN ADULT PO), Take  by mouth every day., Disp: , Rfl:   •  aspirin 81 MG tablet, Take 81 mg by mouth every day., Disp: , Rfl:   •  ferrous gluconate (FERGON) 324 (38 FE) MG Tab, Take 324 mg by mouth every morning with breakfast., Disp: , Rfl:   •  acetaminophen (TYLENOL) 500 MG Tab, Take 500-1,000 mg by mouth every 6 hours as needed., Disp: , Rfl:   •  aripiprazole (ABILIFY) 5 MG tablet, Take 5 mg by mouth every morning. Indications: Major Depressive Disorder, Disp: , Rfl:   •  zolpidem (AMBIEN CR) 12.5 MG CR tablet, Take 12.5 mg by mouth at bedtime as needed for Sleep. Indications: Trouble Sleeping, Disp: , Rfl:   •  Desvenlafaxine Succinate (PRISTIQ) 100 MG TB24, Take 1 Tab by mouth every morning. Indications: Major Depressive Disorder, Disp: , Rfl:   •  esomeprazole (NEXIUM) 40 MG delayed-release capsule, Take 40 mg by mouth every morning. Indications: Gastroesophageal Reflux Disease with Current Symptoms, Disp: , Rfl:   •  alprazolam (XANAX) 0.5 MG TABS, Take 0.5 mg by mouth as needed for Anxiety. Indications: Feeling Anxious, Disp: , Rfl:   •  Probiotic Product (PROBIOTIC DAILY PO), Take 1 Cap by mouth every day., Disp: , Rfl:   •  CELEBREX 200 MG PO  CAPS, Take 200 mg by mouth. Takes 3x per week on Mon, Wed, Fri  Indications: Joint Damage causing Pain and Loss of Function, Disp: , Rfl:   •  FLONASE 50 MCG/ACT NA SUSP, Spray 1 Spray in nose as needed. Indications: Hayfever, Disp: , Rfl:       PCP:  Demarcus Quintana R.N.   9/1/2017  1:11 PM

## 2017-10-12 ENCOUNTER — OFFICE VISIT (OUTPATIENT)
Dept: CARDIOLOGY | Facility: CLINIC | Age: 72
End: 2017-10-12
Payer: MEDICARE

## 2017-10-12 VITALS
SYSTOLIC BLOOD PRESSURE: 120 MMHG | BODY MASS INDEX: 24.24 KG/M2 | DIASTOLIC BLOOD PRESSURE: 80 MMHG | HEART RATE: 98 BPM | HEIGHT: 64 IN | WEIGHT: 142 LBS

## 2017-10-12 DIAGNOSIS — I35.0 AORTIC STENOSIS, MILD: ICD-10-CM

## 2017-10-12 DIAGNOSIS — I35.1 NONRHEUMATIC AORTIC VALVE INSUFFICIENCY: ICD-10-CM

## 2017-10-12 PROCEDURE — 93000 ELECTROCARDIOGRAM COMPLETE: CPT | Performed by: INTERNAL MEDICINE

## 2017-10-12 PROCEDURE — 99204 OFFICE O/P NEW MOD 45 MIN: CPT | Performed by: INTERNAL MEDICINE

## 2017-10-12 ASSESSMENT — ENCOUNTER SYMPTOMS
EYE DISCHARGE: 0
COUGH: 0
PALPITATIONS: 0
FEVER: 0
DEPRESSION: 0
DIZZINESS: 0
BRUISES/BLEEDS EASILY: 0
HEADACHES: 0
NAUSEA: 0
SHORTNESS OF BREATH: 0
HEARTBURN: 0
CHILLS: 0
BLURRED VISION: 0
PND: 0
MYALGIAS: 0
NERVOUS/ANXIOUS: 0

## 2017-10-12 NOTE — PROGRESS NOTES
"Subjective:   Tiffany Louis is a 72 y.o. female who presents today in consult  At request of Dr. Muir for aortic stenosis.and aortic regurgitation  Long history of heart murmur  Three echoes in the last 2 years.no gradient noted on the 1st echo. Now mild gradient documented  By choice, she does little physical exercise  No symptoms to suggest heart disease   No history of rheumatic fever    Of note, chest radiation for breast cancer  Currently in remission    Family history: Father had unspecified  Heart trouble      Office EKG today is normal    Past Medical History:   Diagnosis Date   • Urinary bladder disorder 6/21/2017    reports freq infections but none in the past year    • Umbilical hernia 10/2016   • Breast cancer (CMS-HCC)     left   • Cancer (CMS-HCC) 2015    breast   • Anemia    • Anesthesia     \"very low blood pressure\"   • Arthritis     osteo, \"all over\"   • Cancer (CMS-HCC)     skin   • Dental disorder     upper implants front teeth   • Heart burn    • Heart murmur    • Hypertension    • Indigestion    • Joint replacement     bilateral knees and right hip   • Psychiatric problem     anxiety/depression   • Sleep apnea     CPAP   • Snoring    • Unspecified urinary incontinence      Past Surgical History:   Procedure Laterality Date   • LATISSIMUS FLAP Left 6/27/2017    Procedure: LATISSIMUS FLAP W/INSERTION OF IMPLANT;  Surgeon: Johnny Flannery M.D.;  Location: [facility];  Service:    • BREAST IMPLANT REMOVAL Left 6/27/2017    Procedure: BREAST IMPLANT REMOVAL;  Surgeon: Johnny Flannery M.D.;  Location: [facility];  Service:    • CAPSULECTOMY  4/13/2017    and debridement left breast   • BOWEL RESECTION  12/28/2016   • BREAST RECONSTRUCTION Bilateral 11/29/2016    Procedure: BREAST RECONSTRUCTION;  Surgeon: Johnny Flannery M.D.;  Location: [facility];  Service:    • LATISSIMUS FLAP Right 11/29/2016    Procedure: LATISSIMUS FLAP W/ IMPLANT;  " Surgeon: Johnny Flannery M.D.;  Location: [facility];  Service:    • TISSUE EXPANDER PLACE/REMOVE Left 11/29/2016    Procedure: TISSUE EXPANDER REMOVE - REMOVAL AND INSERTION OF IMPLANT;  Surgeon: Johnny Flannery M.D.;  Location: [facility];  Service:    • CAPSULECTOMY Bilateral 11/29/2016    Procedure: CAPSULECTOMY;  Surgeon: Johnny Flannery M.D.;  Location: [facility];  Service:    • OTHER SURGICAL PROCEDURE Right 7/2016    debridement and breast expander placement   • BREAST IMPLANT REMOVAL Right 1/6/2016    Procedure: BREAST IMPLANT REMOVAL, placement of drain;  Surgeon: Jon Leblanc M.D.;  Location: [facility];  Service:    • OTHER SURGICAL PROCEDURE Right 1/2016     second debridement right breast   • CATH PLACEMENT Right 9/8/2015    Procedure: CATH PLACEMENT PORT;  Surgeon: Vargas Hyde M.D.;  Location: [facility];  Service:    • OTHER SURGICAL PROCEDURE  9/8/2015    Port placement for chemo   • MASTECTOMY MODIFIED RADICAL Left 8/10/2015    Procedure: MASTECTOMY MODIFIED RADICAL;  Surgeon: Vargas Hyde M.D.;  Location: [facility];  Service:    • MASTECTOMY Right 8/10/2015    Procedure: MASTECTOMY SIMPLE;  Surgeon: Vargas Hyde M.D.;  Location: [facility];  Service:    • NODE BIOPSY SENTINEL Left 8/10/2015    Procedure: NODE BIOPSY SENTINEL;  Surgeon: Vargas Hyde M.D.;  Location: [facility];  Service:    • BREAST RECONSTRUCTION Bilateral 8/10/2015    Procedure: BREAST RECONSTRUCTION VIA;  Surgeon: Johnny Flannery M.D.;  Location: [facility];  Service:    • TISSUE EXPANDER PLACE/REMOVE Bilateral 8/10/2015    Procedure: TISSUE EXPANDER PLACE/REMOVE & POSSIBLE ALLODERM;  Surgeon: Johnny Flannery M.D.;  Location: [facility];  Service:    • ROTATOR CUFF REPAIR Right 2009    right   • HIP ARTHROPLASTY TOTAL Right 2008    Hip Replacement, Total, right   • HAND  "SURGERY Right 2/12/07    joint Right Thumb   • ABDOMINOPLASTY  10/28/04   • GASTRIC BYPASS LAPAROSCOPIC  10/10/01   • KNEE ARTHROPLASTY TOTAL Bilateral 1/24/00    bilateral knees   • OTHER  9/99    tumor exc Right hip   • INGRID BY LAPAROSCOPY  9/97   • BLADDER SUSPENSION  12/93   • HYSTERECTOMY, TOTAL ABDOMINAL  5/93    rectal, bladder repair   • EAR MIDDLE EXPLORATION Left 4/90    Left   • BREAST BIOPSY Left 6/85   • TUBAL LIGATION  3/84   • KNEE ARTHROSCOPY Left 1983, 12/97    Left   • KNEE ARTHROSCOPY Right 6/88, 12/91, 11/97    Right   • OTHER  5/97, 4/05/05    vaginal repair   • OTHER SURGICAL PROCEDURE  4/06, 10/06    bilateral \"arm lift\"     History reviewed. No pertinent family history.  History   Smoking Status   • Never Smoker   Smokeless Tobacco   • Never Used     Allergies   Allergen Reactions   • Clindamycin Shortness of Breath and Rash     .   • Levaquin Shortness of Breath   • Penicillins Shortness of Breath and Rash     Tolerated Ancef 08/10/15   • Ampicillin      Tolerated Cefazolin on 08/10/15   • Demerol Itching     Itching and rash       Outpatient Encounter Prescriptions as of 10/12/2017   Medication Sig Dispense Refill   • Coenzyme Q10 (COQ10 PO) Take 300 mg by mouth every day.     • Cyanocobalamin (VITAMIN B-12) 1000 MCG Tab Take 2,000 mcg by mouth every day.     • Multiple Vitamins-Minerals (MULTIVITAMIN ADULT PO) Take  by mouth every day.     • aspirin 81 MG tablet Take 81 mg by mouth every day.     • ferrous gluconate (FERGON) 324 (38 FE) MG Tab Take 324 mg by mouth every morning with breakfast.     • acetaminophen (TYLENOL) 500 MG Tab Take 500-1,000 mg by mouth every 6 hours as needed.     • aripiprazole (ABILIFY) 5 MG tablet Take 5 mg by mouth every morning. Indications: Major Depressive Disorder     • zolpidem (AMBIEN CR) 12.5 MG CR tablet Take 12.5 mg by mouth at bedtime as needed for Sleep. Indications: Trouble Sleeping     • Desvenlafaxine Succinate (PRISTIQ) 100 MG TB24 Take 1 Tab by " "mouth every morning. Indications: Major Depressive Disorder     • esomeprazole (NEXIUM) 40 MG delayed-release capsule Take 40 mg by mouth every morning. Indications: Gastroesophageal Reflux Disease with Current Symptoms     • alprazolam (XANAX) 0.5 MG TABS Take 0.5 mg by mouth as needed for Anxiety. Indications: Feeling Anxious     • Probiotic Product (PROBIOTIC DAILY PO) Take 1 Cap by mouth every day.     • CELEBREX 200 MG PO CAPS Take 200 mg by mouth. Takes 3x per week on Mon, Wed, Fri  Indications: Joint Damage causing Pain and Loss of Function     • FLONASE 50 MCG/ACT NA SUSP Spray 1 Spray in nose as needed. Indications: Hayfever       No facility-administered encounter medications on file as of 10/12/2017.      Review of Systems   Constitutional: Negative for chills, fever and malaise/fatigue.   Eyes: Negative for blurred vision and discharge.   Respiratory: Negative for cough and shortness of breath.    Cardiovascular: Negative for chest pain, palpitations, leg swelling and PND.   Gastrointestinal: Negative for heartburn and nausea.   Genitourinary: Negative for dysuria and urgency.   Musculoskeletal: Negative for myalgias.   Skin: Negative for itching and rash.   Neurological: Negative for dizziness and headaches.   Endo/Heme/Allergies: Negative for environmental allergies. Does not bruise/bleed easily.   Psychiatric/Behavioral: Negative for depression. The patient is not nervous/anxious.         Objective:   /80   Pulse 98   Ht 1.626 m (5' 4\")   Wt 64.4 kg (142 lb)   BMI 24.37 kg/m²     Physical Exam   Constitutional: She is oriented to person, place, and time. She appears well-developed and well-nourished.   HENT:   Head: Normocephalic and atraumatic.   Eyes: Conjunctivae and EOM are normal. No scleral icterus.   Neck: Neck supple. No JVD present. No thyromegaly present.   Cardiovascular: Normal rate and regular rhythm.  Exam reveals no gallop and no friction rub.    Murmur heard.   Systolic " murmur is present with a grade of 2/6   Pulmonary/Chest: Effort normal and breath sounds normal. No respiratory distress. She has no wheezes. She has no rales. She exhibits no tenderness.   Abdominal: Soft. Bowel sounds are normal. She exhibits no distension and no mass. There is no tenderness.   Neurological: She is alert and oriented to person, place, and time. Coordination normal.   Skin: Skin is warm and dry. No rash noted. No pallor.   Psychiatric: She has a normal mood and affect. Her behavior is normal. Judgment and thought content normal.       Assessment:     1. Aortic stenosis, mild  Echocardiogram Comp w/o Cont   2. Nonrheumatic aortic valve insufficiency         Medical Decision Making:  Today's Assessment / Status / Plan:   Annual echo ordered and  Annual cardiology follow-up recommended  Thank you for this consult

## 2017-10-12 NOTE — LETTER
"     [facility] [address] [place] [address]  Phone: [phone]  Fax: [phone]              Tiffany Louis  1945    Encounter Date: 10/12/2017    Josh Gomez M.D.          PROGRESS NOTE:  Subjective:   Tiffany Louis is a 72 y.o. female who presents today in consult  At request of Dr. Muir for aortic stenosis.and aortic regurgitation  Long history of heart murmur  Three echoes in the last 2 years.no gradient noted on the 1st echo. Now mild gradient documented  By choice, she does little physical exercise  No symptoms to suggest heart disease   No history of rheumatic fever    Of note, chest radiation for breast cancer  Currently in remission    Family history: Father had unspecified  Heart trouble      Office EKG today is normal    Past Medical History:   Diagnosis Date   • Urinary bladder disorder 6/21/2017    reports freq infections but none in the past year    • Umbilical hernia 10/2016   • Breast cancer (CMS-HCC)     left   • Cancer (CMS-HCC) 2015    breast   • Anemia    • Anesthesia     \"very low blood pressure\"   • Arthritis     osteo, \"all over\"   • Cancer (CMS-HCC)     skin   • Dental disorder     upper implants front teeth   • Heart burn    • Heart murmur    • Hypertension    • Indigestion    • Joint replacement     bilateral knees and right hip   • Psychiatric problem     anxiety/depression   • Sleep apnea     CPAP   • Snoring    • Unspecified urinary incontinence      Past Surgical History:   Procedure Laterality Date   • LATISSIMUS FLAP Left 6/27/2017    Procedure: LATISSIMUS FLAP W/INSERTION OF IMPLANT;  Surgeon: Johnny Flannery M.D.;  Location: SURGERY [facility];  Service:    • BREAST IMPLANT REMOVAL Left 6/27/2017    Procedure: BREAST IMPLANT REMOVAL;  Surgeon: Johnny Flannery M.D.;  Location: [facility];  Service:    • CAPSULECTOMY  4/13/2017    and debridement left breast   • " BOWEL RESECTION  12/28/2016   • BREAST RECONSTRUCTION Bilateral 11/29/2016    Procedure: BREAST RECONSTRUCTION;  Surgeon: Johnny Flannery M.D.;  Location: [facility];  Service:    • LATISSIMUS FLAP Right 11/29/2016    Procedure: LATISSIMUS FLAP W/ IMPLANT;  Surgeon: Johnny Flannery M.D.;  Location: [facility];  Service:    • TISSUE EXPANDER PLACE/REMOVE Left 11/29/2016    Procedure: TISSUE EXPANDER REMOVE - REMOVAL AND INSERTION OF IMPLANT;  Surgeon: Johnny Flannery M.D.;  Location: [facility];  Service:    • CAPSULECTOMY Bilateral 11/29/2016    Procedure: CAPSULECTOMY;  Surgeon: Johnny Flannery M.D.;  Location: [facility];  Service:    • OTHER SURGICAL PROCEDURE Right 7/2016    debridement and breast expander placement   • BREAST IMPLANT REMOVAL Right 1/6/2016    Procedure: BREAST IMPLANT REMOVAL, placement of drain;  Surgeon: Jon Leblanc M.D.;  Location: [facility];  Service:    • OTHER SURGICAL PROCEDURE Right 1/2016     second debridement right breast   • CATH PLACEMENT Right 9/8/2015    Procedure: CATH PLACEMENT PORT;  Surgeon: Vargas Hyde M.D.;  Location: [facility];  Service:    • OTHER SURGICAL PROCEDURE  9/8/2015    Port placement for chemo   • MASTECTOMY MODIFIED RADICAL Left 8/10/2015    Procedure: MASTECTOMY MODIFIED RADICAL;  Surgeon: Vargas Hyde M.D.;  Location: [facility];  Service:    • MASTECTOMY Right 8/10/2015    Procedure: MASTECTOMY SIMPLE;  Surgeon: Vargas Hyde M.D.;  Location: [facility];  Service:    • NODE BIOPSY SENTINEL Left 8/10/2015    Procedure: NODE BIOPSY SENTINEL;  Surgeon: Vargas Hyde M.D.;  Location: [facility];  Service:    • BREAST RECONSTRUCTION Bilateral 8/10/2015    Procedure: BREAST RECONSTRUCTION VIA;  Surgeon: Johnny Flannery M.D.;  Location: [facility];  Service:    • TISSUE EXPANDER PLACE/REMOVE Bilateral  "8/10/2015    Procedure: TISSUE EXPANDER PLACE/REMOVE & POSSIBLE ALLODERM;  Surgeon: Johnny Flannery M.D.;  Location: SURGERY [facility];  Service:    • ROTATOR CUFF REPAIR Right 2009    right   • HIP ARTHROPLASTY TOTAL Right 2008    Hip Replacement, Total, right   • HAND SURGERY Right 2/12/07    joint Right Thumb   • ABDOMINOPLASTY  10/28/04   • GASTRIC BYPASS LAPAROSCOPIC  10/10/01   • KNEE ARTHROPLASTY TOTAL Bilateral 1/24/00    bilateral knees   • OTHER  9/99    tumor exc Right hip   • INGRID BY LAPAROSCOPY  9/97   • BLADDER SUSPENSION  12/93   • HYSTERECTOMY, TOTAL ABDOMINAL  5/93    rectal, bladder repair   • EAR MIDDLE EXPLORATION Left 4/90    Left   • BREAST BIOPSY Left 6/85   • TUBAL LIGATION  3/84   • KNEE ARTHROSCOPY Left 1983, 12/97    Left   • KNEE ARTHROSCOPY Right 6/88, 12/91, 11/97    Right   • OTHER  5/97, 4/05/05    vaginal repair   • OTHER SURGICAL PROCEDURE  4/06, 10/06    bilateral \"arm lift\"     History reviewed. No pertinent family history.  History   Smoking Status   • Never Smoker   Smokeless Tobacco   • Never Used     Allergies   Allergen Reactions   • Clindamycin Shortness of Breath and Rash     .   • Levaquin Shortness of Breath   • Penicillins Shortness of Breath and Rash     Tolerated Ancef 08/10/15   • Ampicillin      Tolerated Cefazolin on 08/10/15   • Demerol Itching     Itching and rash       Outpatient Encounter Prescriptions as of 10/12/2017   Medication Sig Dispense Refill   • Coenzyme Q10 (COQ10 PO) Take 300 mg by mouth every day.     • Cyanocobalamin (VITAMIN B-12) 1000 MCG Tab Take 2,000 mcg by mouth every day.     • Multiple Vitamins-Minerals (MULTIVITAMIN ADULT PO) Take  by mouth every day.     • aspirin 81 MG tablet Take 81 mg by mouth every day.     • ferrous gluconate (FERGON) 324 (38 FE) MG Tab Take 324 mg by mouth every morning with breakfast.     • acetaminophen (TYLENOL) 500 MG Tab Take 500-1,000 mg by mouth every 6 hours as needed.     • aripiprazole (ABILIFY) 5 " "MG tablet Take 5 mg by mouth every morning. Indications: Major Depressive Disorder     • zolpidem (AMBIEN CR) 12.5 MG CR tablet Take 12.5 mg by mouth at bedtime as needed for Sleep. Indications: Trouble Sleeping     • Desvenlafaxine Succinate (PRISTIQ) 100 MG TB24 Take 1 Tab by mouth every morning. Indications: Major Depressive Disorder     • esomeprazole (NEXIUM) 40 MG delayed-release capsule Take 40 mg by mouth every morning. Indications: Gastroesophageal Reflux Disease with Current Symptoms     • alprazolam (XANAX) 0.5 MG TABS Take 0.5 mg by mouth as needed for Anxiety. Indications: Feeling Anxious     • Probiotic Product (PROBIOTIC DAILY PO) Take 1 Cap by mouth every day.     • CELEBREX 200 MG PO CAPS Take 200 mg by mouth. Takes 3x per week on Mon, Wed, Fri  Indications: Joint Damage causing Pain and Loss of Function     • FLONASE 50 MCG/ACT NA SUSP Spray 1 Spray in nose as needed. Indications: Hayfever       No facility-administered encounter medications on file as of 10/12/2017.      Review of Systems   Constitutional: Negative for chills, fever and malaise/fatigue.   Eyes: Negative for blurred vision and discharge.   Respiratory: Negative for cough and shortness of breath.    Cardiovascular: Negative for chest pain, palpitations, leg swelling and PND.   Gastrointestinal: Negative for heartburn and nausea.   Genitourinary: Negative for dysuria and urgency.   Musculoskeletal: Negative for myalgias.   Skin: Negative for itching and rash.   Neurological: Negative for dizziness and headaches.   Endo/Heme/Allergies: Negative for environmental allergies. Does not bruise/bleed easily.   Psychiatric/Behavioral: Negative for depression. The patient is not nervous/anxious.         Objective:   /80   Pulse 98   Ht 1.626 m (5' 4\")   Wt 64.4 kg (142 lb)   BMI 24.37 kg/m²      Physical Exam   Constitutional: She is oriented to person, place, and time. She appears well-developed and well-nourished.   HENT:   Head: " Normocephalic and atraumatic.   Eyes: Conjunctivae and EOM are normal. No scleral icterus.   Neck: Neck supple. No JVD present. No thyromegaly present.   Cardiovascular: Normal rate and regular rhythm.  Exam reveals no gallop and no friction rub.    Murmur heard.   Systolic murmur is present with a grade of 2/6   Pulmonary/Chest: Effort normal and breath sounds normal. No respiratory distress. She has no wheezes. She has no rales. She exhibits no tenderness.   Abdominal: Soft. Bowel sounds are normal. She exhibits no distension and no mass. There is no tenderness.   Neurological: She is alert and oriented to person, place, and time. Coordination normal.   Skin: Skin is warm and dry. No rash noted. No pallor.   Psychiatric: She has a normal mood and affect. Her behavior is normal. Judgment and thought content normal.       Assessment:     1. Aortic stenosis, mild  Echocardiogram Comp w/o Cont   2. Nonrheumatic aortic valve insufficiency         Medical Decision Making:  Today's Assessment / Status / Plan:   Annual echo ordered and  Annual cardiology follow-up recommended  Thank you for this consult      S KILEY Tracy M.D.  [address] [place] [address]  VIA Facsimile: [phone]     ARSALAN Muir M.D.  [address] [address]  VIA Facsimile: [phone]

## 2017-10-14 LAB — EKG IMPRESSION: NORMAL

## 2017-10-27 NOTE — ADDENDUM NOTE
Encounter addended by: Cielo Arboleda on: 10/27/2017  3:00 PM<BR>    Actions taken: Flowsheet accepted

## 2017-11-16 ENCOUNTER — TELEPHONE (OUTPATIENT)
Dept: CARDIOLOGY | Facility: MEDICAL CENTER | Age: 72
End: 2017-11-16

## 2017-11-16 NOTE — TELEPHONE ENCOUNTER
----- Message from Florencia Bowers R.N. sent at 11/16/2017  9:01 AM PST -----    ----- Message -----  From: Josh Gomez M.D.  Sent: 11/15/2017  12:36 PM  To: Marlin Hernandez R.N.    Echo shows some minimal aortic valve narrowing.  Recommend annual echo

## 2018-03-26 ENCOUNTER — OFFICE VISIT (OUTPATIENT)
Dept: CARDIOLOGY | Facility: MEDICAL CENTER | Age: 73
End: 2018-03-26
Payer: MEDICARE

## 2018-03-26 VITALS
BODY MASS INDEX: 24.59 KG/M2 | WEIGHT: 144 LBS | DIASTOLIC BLOOD PRESSURE: 74 MMHG | SYSTOLIC BLOOD PRESSURE: 140 MMHG | HEART RATE: 88 BPM | HEIGHT: 64 IN

## 2018-03-26 DIAGNOSIS — I70.90 ATHEROSCLEROSIS: ICD-10-CM

## 2018-03-26 DIAGNOSIS — I35.0 AORTIC STENOSIS, MILD: ICD-10-CM

## 2018-03-26 PROCEDURE — 99213 OFFICE O/P EST LOW 20 MIN: CPT | Performed by: INTERNAL MEDICINE

## 2018-03-27 ASSESSMENT — ENCOUNTER SYMPTOMS
PND: 0
NERVOUS/ANXIOUS: 0
BRUISES/BLEEDS EASILY: 0
DIZZINESS: 0
NAUSEA: 0
COUGH: 0
DEPRESSION: 0
FEVER: 0
BLURRED VISION: 0
PALPITATIONS: 0
EYE DISCHARGE: 0
CHILLS: 0
BACK PAIN: 1
HEARTBURN: 0
MYALGIAS: 0
SHORTNESS OF BREATH: 0
HEADACHES: 0

## 2018-03-27 NOTE — PROGRESS NOTES
"Chief Complaint   Patient presents with   • Heart Murmur       Subjective:   Tiffany Louis is a 72 y.o. female who presents today to discuss a recent carotid ultrasound demonstrating mild carotid plaquing.  She also brings in the lab work which demonstrates an untreated LDL cholesterol of 60.  The question is whether she should be on no lipid-lowering therapy or not.  Overall her health is good.  She does have mild aortic stenosis and mild carotid plaquing.  No symptoms of heart disease.  We are following her every 6-12 months for mild aortic stenosis.    It is also mentioned in one imaging report that she has some atherosclerosis of her ascending aorta    Past Medical History:   Diagnosis Date   • Anemia    • Anesthesia     \"very low blood pressure\"   • Arthritis     osteo, \"all over\"   • Breast cancer (CMS-HCC)     left   • Cancer (CMS-HCC)     skin   • Cancer (CMS-HCC) 2015    breast   • Dental disorder     upper implants front teeth   • Heart burn    • Heart murmur    • Hypertension    • Indigestion    • Joint replacement     bilateral knees and right hip   • Psychiatric problem     anxiety/depression   • Sleep apnea     CPAP   • Snoring    • Umbilical hernia 10/2016   • Unspecified urinary incontinence    • Urinary bladder disorder 6/21/2017    reports freq infections but none in the past year      Past Surgical History:   Procedure Laterality Date   • LATISSIMUS FLAP Left 6/27/2017    Procedure: LATISSIMUS FLAP W/INSERTION OF IMPLANT;  Surgeon: Johnny Flannery M.D.;  Location: [facility];  Service:    • BREAST IMPLANT REMOVAL Left 6/27/2017    Procedure: BREAST IMPLANT REMOVAL;  Surgeon: Johnny Flannery M.D.;  Location: [facility];  Service:    • CAPSULECTOMY  4/13/2017    and debridement left breast   • BOWEL RESECTION  12/28/2016   • BREAST RECONSTRUCTION Bilateral 11/29/2016    Procedure: BREAST RECONSTRUCTION;  Surgeon: Johnny Flannery M.D.;  Location: SURGERY " [facility];  Service:    • LATISSIMUS FLAP Right 11/29/2016    Procedure: LATISSIMUS FLAP W/ IMPLANT;  Surgeon: Johnny Flannery M.D.;  Location: [facility];  Service:    • TISSUE EXPANDER PLACE/REMOVE Left 11/29/2016    Procedure: TISSUE EXPANDER REMOVE - REMOVAL AND INSERTION OF IMPLANT;  Surgeon: Johnny Flannery M.D.;  Location: [facility];  Service:    • CAPSULECTOMY Bilateral 11/29/2016    Procedure: CAPSULECTOMY;  Surgeon: Johnny Flannery M.D.;  Location: [facility];  Service:    • OTHER SURGICAL PROCEDURE Right 7/2016    debridement and breast expander placement   • BREAST IMPLANT REMOVAL Right 1/6/2016    Procedure: BREAST IMPLANT REMOVAL, placement of drain;  Surgeon: Jon Leblanc M.D.;  Location: [facility];  Service:    • OTHER SURGICAL PROCEDURE Right 1/2016     second debridement right breast   • CATH PLACEMENT Right 9/8/2015    Procedure: CATH PLACEMENT PORT;  Surgeon: Vargas Hyde M.D.;  Location: [facility];  Service:    • OTHER SURGICAL PROCEDURE  9/8/2015    Port placement for chemo   • MASTECTOMY MODIFIED RADICAL Left 8/10/2015    Procedure: MASTECTOMY MODIFIED RADICAL;  Surgeon: Vargas Hyde M.D.;  Location: [facility];  Service:    • MASTECTOMY Right 8/10/2015    Procedure: MASTECTOMY SIMPLE;  Surgeon: Vargas Hyde M.D.;  Location: [facility];  Service:    • NODE BIOPSY SENTINEL Left 8/10/2015    Procedure: NODE BIOPSY SENTINEL;  Surgeon: Vargas Hyde M.D.;  Location: [facility];  Service:    • BREAST RECONSTRUCTION Bilateral 8/10/2015    Procedure: BREAST RECONSTRUCTION VIA;  Surgeon: Johnny Flannery M.D.;  Location: [facility];  Service:    • TISSUE EXPANDER PLACE/REMOVE Bilateral 8/10/2015    Procedure: TISSUE EXPANDER PLACE/REMOVE & POSSIBLE ALLODERM;  Surgeon: Johnny Flannery M.D.;  Location: [facility];  Service:    • ROTATOR  "CUFF REPAIR Right 2009    right   • HIP ARTHROPLASTY TOTAL Right 2008    Hip Replacement, Total, right   • HAND SURGERY Right 2/12/07    joint Right Thumb   • ABDOMINOPLASTY  10/28/04   • GASTRIC BYPASS LAPAROSCOPIC  10/10/01   • KNEE ARTHROPLASTY TOTAL Bilateral 1/24/00    bilateral knees   • OTHER  9/99    tumor exc Right hip   • INGRID BY LAPAROSCOPY  9/97   • BLADDER SUSPENSION  12/93   • HYSTERECTOMY, TOTAL ABDOMINAL  5/93    rectal, bladder repair   • EAR MIDDLE EXPLORATION Left 4/90    Left   • BREAST BIOPSY Left 6/85   • TUBAL LIGATION  3/84   • KNEE ARTHROSCOPY Left 1983, 12/97    Left   • KNEE ARTHROSCOPY Right 6/88, 12/91, 11/97    Right   • OTHER  5/97, 4/05/05    vaginal repair   • OTHER SURGICAL PROCEDURE  4/06, 10/06    bilateral \"arm lift\"     History reviewed. No pertinent family history.  Social History     Social History   • Marital status:      Spouse name: N/A   • Number of children: N/A   • Years of education: N/A     Occupational History   • Not on file.     Social History Main Topics   • Smoking status: Never Smoker   • Smokeless tobacco: Never Used   • Alcohol use Yes      Comment: 2-3 per month   • Drug use: No   • Sexual activity: Not on file     Other Topics Concern   • Not on file     Social History Narrative   • No narrative on file     Allergies   Allergen Reactions   • Clindamycin Shortness of Breath and Rash     .   • Levaquin Shortness of Breath   • Penicillins Shortness of Breath and Rash     Tolerated Ancef 08/10/15   • Ampicillin      Tolerated Cefazolin on 08/10/15   • Demerol Itching     Itching and rash       Outpatient Encounter Prescriptions as of 3/26/2018   Medication Sig Dispense Refill   • Cholecalciferol (VITAMIN D3 PO) Take 4,000 Units by mouth.     • Coenzyme Q10 (COQ10 PO) Take 300 mg by mouth every day.     • Cyanocobalamin (VITAMIN B-12) 1000 MCG Tab Take 2,000 mcg by mouth every day.     • Multiple Vitamins-Minerals (MULTIVITAMIN ADULT PO) Take  by mouth " every day.     • aspirin 81 MG tablet Take 81 mg by mouth every day.     • ferrous gluconate (FERGON) 324 (38 FE) MG Tab Take 324 mg by mouth every morning with breakfast.     • acetaminophen (TYLENOL) 500 MG Tab Take 500-1,000 mg by mouth every 6 hours as needed.     • aripiprazole (ABILIFY) 5 MG tablet Take 5 mg by mouth every morning. Indications: Major Depressive Disorder     • zolpidem (AMBIEN CR) 12.5 MG CR tablet Take 12.5 mg by mouth at bedtime as needed for Sleep. Indications: Trouble Sleeping     • Desvenlafaxine Succinate (PRISTIQ) 100 MG TB24 Take 1 Tab by mouth every morning. Indications: Major Depressive Disorder     • esomeprazole (NEXIUM) 40 MG delayed-release capsule Take 40 mg by mouth every morning. Indications: Gastroesophageal Reflux Disease with Current Symptoms     • alprazolam (XANAX) 0.5 MG TABS Take 0.5 mg by mouth as needed for Anxiety. Indications: Feeling Anxious     • Probiotic Product (PROBIOTIC DAILY PO) Take 1 Cap by mouth every day.     • CELEBREX 200 MG PO CAPS Take 200 mg by mouth. Takes 3x per week on Mon, Wed, Fri  Indications: Joint Damage causing Pain and Loss of Function     • FLONASE 50 MCG/ACT NA SUSP Spray 1 Spray in nose as needed. Indications: Hayfever       No facility-administered encounter medications on file as of 3/26/2018.      Review of Systems   Constitutional: Negative for chills, fever and malaise/fatigue.   Eyes: Negative for blurred vision and discharge.   Respiratory: Negative for cough and shortness of breath.    Cardiovascular: Negative for chest pain, palpitations, leg swelling and PND.   Gastrointestinal: Negative for heartburn and nausea.   Genitourinary: Negative for dysuria and urgency.   Musculoskeletal: Positive for back pain and joint pain. Negative for myalgias.   Skin: Negative for itching and rash.   Neurological: Negative for dizziness and headaches.   Endo/Heme/Allergies: Negative for environmental allergies. Does not bruise/bleed easily.  "  Psychiatric/Behavioral: Negative for depression. The patient is not nervous/anxious.         Objective:   /74   Pulse 88   Ht 1.626 m (5' 4\")   Wt 65.3 kg (144 lb)   BMI 24.72 kg/m²     Physical Exam   Constitutional: She is oriented to person, place, and time.   Neck: No JVD present.   Cardiovascular: Normal rate and regular rhythm.    Murmur heard.   Systolic murmur is present with a grade of 2/6   Pulses:       Carotid pulses are 3+ on the right side, and 3+ on the left side.       Radial pulses are 3+ on the right side, and 3+ on the left side.   Pulmonary/Chest: Effort normal and breath sounds normal.   Musculoskeletal: She exhibits no edema.   Neurological: She is alert and oriented to person, place, and time.   Skin: Skin is warm and dry.       Assessment:     1. Aortic stenosis, mild  ECHOCARDIOGRAM COMP W/O CONT   2. Atherosclerosis         Medical Decision Making:  Today's Assessment / Status / Plan:   With the untreated LDL cholesterol of 60 and minimal plaquing of carotids and some calcification of aorta and aortic valve, I would favor not using a statin at this time.  She is on aspirin 81 mg per day.  Return in December with a previsit echo in the absence of symptoms  "

## 2018-07-18 ENCOUNTER — SLEEP CENTER VISIT (OUTPATIENT)
Dept: SLEEP MEDICINE | Facility: MEDICAL CENTER | Age: 73
End: 2018-07-18
Payer: MEDICARE

## 2018-07-18 VITALS
HEART RATE: 91 BPM | RESPIRATION RATE: 15 BRPM | SYSTOLIC BLOOD PRESSURE: 130 MMHG | WEIGHT: 155 LBS | OXYGEN SATURATION: 93 % | BODY MASS INDEX: 26.46 KG/M2 | DIASTOLIC BLOOD PRESSURE: 84 MMHG | HEIGHT: 64 IN

## 2018-07-18 DIAGNOSIS — F99 INSOMNIA DUE TO OTHER MENTAL DISORDER: ICD-10-CM

## 2018-07-18 DIAGNOSIS — F51.05 INSOMNIA DUE TO OTHER MENTAL DISORDER: ICD-10-CM

## 2018-07-18 DIAGNOSIS — G47.33 OSA (OBSTRUCTIVE SLEEP APNEA): ICD-10-CM

## 2018-07-18 PROBLEM — G47.00 INSOMNIA: Status: ACTIVE | Noted: 2018-07-18

## 2018-07-18 PROCEDURE — 99203 OFFICE O/P NEW LOW 30 MIN: CPT | Performed by: FAMILY MEDICINE

## 2018-07-18 NOTE — PROGRESS NOTES
"     [facility]  Consult Note     Date: 7/18/2018 / Time: 1:58 PM    Patient ID:   Name:             Tiffany Louis   YOB: 1945  Age:                 73 y.o.  female   MRN:               [id number]      Thank you for requesting a sleep medicine consultation on Tiffany Louis at the sleep center. She presents today with the chief complaints of RICHMOND and to establish as a new pt. She was previously seen by . Pt was diagnosed with RICHMOND on 3/8/09 with msever [place] with AHI of 32.9/hr and O2 sathya 84%. Last SS was on 12/22/14 with AHI of 13.8/hr and O2 sathya 90%. She has been on CPAP 7 cm since then.She is referred by Dr. Tracy for evaluation and treatment of sleep disorder breathing .     HISTORY OF PRESENT ILLNESS:       At night,  Tiffany Louis goes to bed around 8:30-90 pm on weekdays and on weekends. She gets out of bed at 6 am on weekdays and on weekends.  She  averages 7.5 hrs of sleep on a good night and 5-6 hrs on a bad night. Pt has bad nights 1 nights per 2 months. She falls asleep within 5 minutes with the use of Ambien 12.5 mg CR. She has been using it for last 6 years. She awakens 1-2 times a night due to bathroom. It takes her few min to fall back asleep.     She is not aware of snoring/witnessed apneas/gasping or choking in sleep since she has been on CPAP.  She  denies any symptoms of restless legs syndrome such as an \"urge to move\"  She  legs in the evening or bedtime. She  denies any symptoms of narcolepsy such as sleep paralysis or cataplexy, or any symptoms to suggest parasomnias such as sleep walking or acting out of dreams.     Overall,  She does  finds her sleep refreshing. When She  wakes up in the morning, She does not feel tired.Candor sleepiness scale score is normal   She does not take regular naps.     She is on CPAP 7 cm.The 30 day compliance was downloaded which shows adequate compliance with more that 4 hr usage about 73%. The AHI is has improved to " 1.2/hr. The mask leak is normal.       REVIEW OF SYSTEMS:       Constitutional: Denies fevers, Denies weight changes  Eyes: Denies changes in vision, no eye pain  Ears/Nose/Throat/Mouth: Denies nasal congestion or sore throat   Cardiovascular: Denies chest pain or palpitations   Respiratory: Denies shortness of breath , + dry cough  Gastrointestinal/Hepatic: Denies abdominal pain, nausea, vomiting, diarrhea, constipation or GI bleeding   Genitourinary: Denies bladder dysfunction, dysuria or frequency  Musculoskeletal/Rheum: Denies  joint pain and swelling   Skin/Breast: Denies rash, denies breast lumps or discharge  Neurological: Denies headache, confusion, memory loss or focal weakness/parasthesias  Psychiatric: denies mood disorder     Comprehensive review of systems form is reviewed with the patient and is attached in the EMR.     PMH:  has a past medical history of Anemia; Anesthesia; Arthritis; Breast cancer (HCC) (); Bronchitis; Cancer (HCC); Cancer (HCC) (2015); Dental disorder; Depression; GERD (gastroesophageal reflux disease); Heart burn; Heart murmur; Hypertension; Indigestion; Joint replacement; Nasal drainage; Obesity; Osteoporosis; Psychiatric problem; Restless leg syndrome; Sleep apnea; Snoring; Umbilical hernia (10/2016); Unspecified urinary incontinence; and Urinary bladder disorder (6/21/2017).  MEDS:   Current Outpatient Prescriptions:   •  Cholecalciferol (VITAMIN D3 PO), Take 4,000 Units by mouth., Disp: , Rfl:   •  Coenzyme Q10 (COQ10 PO), Take 300 mg by mouth every day., Disp: , Rfl:   •  Cyanocobalamin (VITAMIN B-12) 1000 MCG Tab, Take 2,000 mcg by mouth every day., Disp: , Rfl:   •  Multiple Vitamins-Minerals (MULTIVITAMIN ADULT PO), Take  by mouth every day., Disp: , Rfl:   •  aspirin 81 MG tablet, Take 81 mg by mouth every day., Disp: , Rfl:   •  ferrous gluconate (FERGON) 324 (38 FE) MG Tab, Take 324 mg by mouth every morning with breakfast., Disp: , Rfl:   •  aripiprazole  (ABILIFY) 5 MG tablet, Take 5 mg by mouth every morning. Indications: Major Depressive Disorder, Disp: , Rfl:   •  zolpidem (AMBIEN CR) 12.5 MG CR tablet, Take 12.5 mg by mouth at bedtime as needed for Sleep. Indications: Trouble Sleeping, Disp: , Rfl:   •  Desvenlafaxine Succinate (PRISTIQ) 100 MG TB24, Take 1 Tab by mouth every morning. Indications: Major Depressive Disorder, Disp: , Rfl:   •  esomeprazole (NEXIUM) 40 MG delayed-release capsule, Take 40 mg by mouth every morning. Indications: Gastroesophageal Reflux Disease with Current Symptoms, Disp: , Rfl:   •  alprazolam (XANAX) 0.5 MG TABS, Take 0.5 mg by mouth as needed for Anxiety. Indications: Feeling Anxious, Disp: , Rfl:   •  Probiotic Product (PROBIOTIC DAILY PO), Take 1 Cap by mouth every day., Disp: , Rfl:   •  CELEBREX 200 MG PO CAPS, Take 200 mg by mouth. Takes 3x per week on Mon, Wed, Fri  Indications: Joint Damage causing Pain and Loss of Function, Disp: , Rfl:   •  FLONASE 50 MCG/ACT NA SUSP, Spray 1 Spray in nose as needed. Indications: Hayfever, Disp: , Rfl:   •  acetaminophen (TYLENOL) 500 MG Tab, Take 500-1,000 mg by mouth every 6 hours as needed., Disp: , Rfl:   ALLERGIES:   Allergies   Allergen Reactions   • Clindamycin Shortness of Breath and Rash     .   • Levaquin Shortness of Breath   • Penicillins Shortness of Breath and Rash     Tolerated Ancef 08/10/15   • Ampicillin      Tolerated Cefazolin on 08/10/15   • Demerol Itching     Itching and rash       SURGHX:   Past Surgical History:   Procedure Laterality Date   • LATISSIMUS FLAP Left 6/27/2017    Procedure: LATISSIMUS FLAP W/INSERTION OF IMPLANT;  Surgeon: Johnny Flannery M.D.;  Location: SURGERY [facility];  Service:    • BREAST IMPLANT REMOVAL Left 6/27/2017    Procedure: BREAST IMPLANT REMOVAL;  Surgeon: Johnny Flannery M.D.;  Location: SURGERY [facility];  Service:    • CAPSULECTOMY  4/13/2017    and debridement left breast   • BOWEL RESECTION  12/28/2016   •  BREAST RECONSTRUCTION Bilateral 11/29/2016    Procedure: BREAST RECONSTRUCTION;  Surgeon: Johnny Flannery M.D.;  Location: [facility];  Service:    • LATISSIMUS FLAP Right 11/29/2016    Procedure: LATISSIMUS FLAP W/ IMPLANT;  Surgeon: Johnny Flannery M.D.;  Location: [facility];  Service:    • TISSUE EXPANDER PLACE/REMOVE Left 11/29/2016    Procedure: TISSUE EXPANDER REMOVE - REMOVAL AND INSERTION OF IMPLANT;  Surgeon: Johnny Flannery M.D.;  Location: [facility];  Service:    • CAPSULECTOMY Bilateral 11/29/2016    Procedure: CAPSULECTOMY;  Surgeon: Johnny Flannery M.D.;  Location: [facility];  Service:    • OTHER SURGICAL PROCEDURE Right 7/2016    debridement and breast expander placement   • BREAST IMPLANT REMOVAL Right 1/6/2016    Procedure: BREAST IMPLANT REMOVAL, placement of drain;  Surgeon: Jon Leblanc M.D.;  Location: [facility];  Service:    • OTHER SURGICAL PROCEDURE Right 1/2016     second debridement right breast   • CATH PLACEMENT Right 9/8/2015    Procedure: CATH PLACEMENT PORT;  Surgeon: Vargas Hyde M.D.;  Location: [facility];  Service:    • OTHER SURGICAL PROCEDURE  9/8/2015    Port placement for chemo   • MASTECTOMY MODIFIED RADICAL Left 8/10/2015    Procedure: MASTECTOMY MODIFIED RADICAL;  Surgeon: Vargas Hyde M.D.;  Location: [facility];  Service:    • MASTECTOMY Right 8/10/2015    Procedure: MASTECTOMY SIMPLE;  Surgeon: Vargas Hyde M.D.;  Location: [facility];  Service:    • NODE BIOPSY SENTINEL Left 8/10/2015    Procedure: NODE BIOPSY SENTINEL;  Surgeon: Vargas Hyde M.D.;  Location: [facility];  Service:    • BREAST RECONSTRUCTION Bilateral 8/10/2015    Procedure: BREAST RECONSTRUCTION VIA;  Surgeon: Johnny Flannery M.D.;  Location: [facility];  Service:    • TISSUE EXPANDER PLACE/REMOVE Bilateral 8/10/2015    Procedure: TISSUE  "EXPANDER PLACE/REMOVE & POSSIBLE ALLODERM;  Surgeon: Johnny Flannery M.D.;  Location: SURGERY [facility];  Service:    • ROTATOR CUFF REPAIR Right 2009    right   • HIP ARTHROPLASTY TOTAL Right 2008    Hip Replacement, Total, right   • HAND SURGERY Right 2/12/07    joint Right Thumb   • ABDOMINOPLASTY  10/28/04   • GASTRIC BYPASS LAPAROSCOPIC  10/10/01   • KNEE ARTHROPLASTY TOTAL Bilateral 1/24/00    bilateral knees   • OTHER  9/99    tumor exc Right hip   • INGRID BY LAPAROSCOPY  9/97   • BLADDER SUSPENSION  12/93   • HYSTERECTOMY, TOTAL ABDOMINAL  5/93    rectal, bladder repair   • EAR MIDDLE EXPLORATION Left 4/90    Left   • BREAST BIOPSY Left 6/85   • TUBAL LIGATION  3/84   • ARTHROSCOPY, KNEE     • CHOLECYSTECTOMY     • COLON RESECTION     • HIP REPLACEMENT, TOTAL     • KNEE ARTHROSCOPY Left 1983, 12/97    Left   • KNEE ARTHROSCOPY Right 6/88, 12/91, 11/97    Right   • OTHER  5/97, 4/05/05    vaginal repair   • OTHER SURGICAL PROCEDURE  4/06, 10/06    bilateral \"arm lift\"   • SLEEVE,ARLEEN VASO THIGH     • TONSILLECTOMY       SOCHX:  reports that she has never smoked. She has never used smokeless tobacco. She reports that she drinks alcohol. She reports that she does not use drugs..  FH:   Family History   Problem Relation Age of Onset   • Kidney Disease Mother    • Heart Failure Father            Physical Exam:  Vitals/ General Appearance:   Weight/BMI: Body mass index is 26.61 kg/m².  Blood pressure 130/84, pulse 91, resp. rate 15, height 1.626 m (5' 4\"), weight 70.3 kg (155 lb), SpO2 93 %.  Vitals:    07/18/18 1330   BP: 130/84   Pulse: 91   Resp: 15   SpO2: 93%   Weight: 70.3 kg (155 lb)   Height: 1.626 m (5' 4\")       Pt. is alert and oriented to time, place and person. Cooperative and in no apparent distress.       1. Head: Atraumatic, normocephalic.   2. Ears: Normal tympanic membrane and no discharge  3. Nose: No inferior turbinate hypertophy, no septal deviation, no polyp.   4. Throat: Oropharynx " appears adequate in that the palate is overhanging (Malam Claudia scale 2. Tonsils are not enlarged, uvula is not large, pharynx not inflamed. Tongue isnot large. has intact dentition and oral hygiene is fair.  5. Neck: Supple. No thyromegaly  6. Chest: Trachea central  7. Lungs auscultation: B/L good air entry, vesicular breath sounds, no adventitious sounds  8. Heart auscultation: 1st and 2nd heart sounds normal, regular rhythm. 3/6 murmur.  9. Extremities:  no clubbing, no pedal edema.   Peripheral pulses felt.  10. Skin: No rash  11. NEUROLOGICAL EXAMINATION: On neurological exam, the patient was alert and oriented x3. speech was clear and fluent without dysarthria.      INVESTIGATIONS:     ASSESSMENT AND PLAN     1. Sleep Apnea (RICHMOND).She  Is currently on CPAP 7 cm. 30 day compliance was downloaded which shows adequate compliance. The symptoms of excessive daytime, snoring and gasping has improved      The pathophysiology of sleep anea and the increased risk of cardiovascular morbidity from untreated sleep apnea is discussed in detail with the patient.      She is urged to avoid supine sleep, weight gain and alcoholic beverages since all of these can worsen sleep apnea. She is cautioned against drowsy driving. If She feels sleepy while driving, She must pull over for a break/nap, rather than persist on the road, in the interest of She own safety and that of others on the road.   Plan   - Continue CPAP at 7 cm with nasal pillow  mask e    - compliance download and Dr. Hunter's records  was reviewed and discussed with the pt   - compliance was reinforced     2. The evolution of psychophysiological insomnia is discussed in detail. The importance of cognitive restructuring and behavior modification therapy is emphasized for long-term improvement rather than the use of hypnotic agents, which may offer short term relief but may lead to the development of tolerance and side effects with prolonged use. Evening exercise,  wind-down before bedtime, and stimulus control (leaving the bed when unable to fall back asleep at night) are explained.     Plan   - She is on ambien 12.5 mg CR, recommended to try to wean off. CBT-I with Jagjit was dicussed  however she deferred it at this point. meds are managed by her psychiatrist.    - Handouts on stimulus control and sleep hygiene are given and a couple of titles of books on insomnia  are recommended, written by behavioral psychologists.    3. Regarding treatment of other past medical problems and general health maintenance,  She is urged to follow up with PCP.

## 2018-07-18 NOTE — PATIENT INSTRUCTIONS
Stimulus control (Ref: UpToDate)    Patients with insomnia may associate their bed and bedroom with the fear of not sleeping or other arousing events, rather than the more pleasurable anticipation of sleep. The longer one stays in bed trying to sleep, the stronger the association becomes. This perpetuates the difficulty falling asleep.Stimulus control therapy is a strategy whose purpose is to disrupt this association by enhancing the likelihood of sleep . Patients should not go to bed until they are sleepy and should use the bed primarily for sleep (and not for reading, watching television, eating, or worrying). They should not spend more than 20 minutes in bed awake. If they are awake after 20 minutes, they should leave the bedroom and engage in a relaxing activity, such as reading or listening to soothing music. Patients should not engage in activities that stimulate them or reward them for being awake in the middle of the night, such as eating or watching television. In addition, they should not return to bed until they are tired and feel ready to sleep. If they return to bed and still cannot sleep within 20 minutes, the process should be repeated. An alarm should be set to wake the patient at the same time every morning, including weekends. Daytime naps are not allowed.    Sleep hygiene (ref: UpToDate)  ?Sleep as long as necessary to feel rested (usually seven to eight hours for adults) and then get out of bed  ?Maintain a regular sleep schedule, particularly a regular wake-up time in the morning  ?Try not to force sleep  ?Avoid caffeinated beverages after lunch  ?Avoid alcohol near bedtime (eg, late afternoon and evening)  ?Avoid smoking or other nicotine intake, particularly during the evening  ?Adjust the bedroom environment as needed to decrease stimuli (eg, reduce ambient light, turn off the television or radio)  ?Avoid use of light-emitting screens  (laptops, tablets, smartphones, [employer]) 2 hours before  "bedtime   ?Resolve concerns or worries before bedtime  ?Exercise regularly for at least 20 minutes, preferably more than four to five hours prior to bedtime.  ?Avoid daytime naps, especially if they are longer than 20 to 30 minutes or occur late in the day    BOOKS:  \"Say Norah to Insomnia\" by Ahmet Stone OR \"No More Sleepless Nights\" by Eron Donald    "

## 2018-08-29 ENCOUNTER — TELEPHONE (OUTPATIENT)
Dept: CARDIOLOGY | Facility: MEDICAL CENTER | Age: 73
End: 2018-08-29

## 2018-08-29 NOTE — TELEPHONE ENCOUNTER
LM for patient that surgical clearance letter has been faxed to Dr. Taylor and that she may hold her aspirin as necessary for surgery.

## 2018-10-11 ENCOUNTER — HOSPITAL ENCOUNTER (OUTPATIENT)
Dept: RADIOLOGY | Facility: MEDICAL CENTER | Age: 73
End: 2018-10-11
Attending: SPECIALIST
Payer: MEDICARE

## 2018-10-11 DIAGNOSIS — C50.912 MALIGNANT NEOPLASM OF LEFT FEMALE BREAST, UNSPECIFIED ESTROGEN RECEPTOR STATUS, UNSPECIFIED SITE OF BREAST (HCC): ICD-10-CM

## 2018-10-11 PROCEDURE — 700117 HCHG RX CONTRAST REV CODE 255: Performed by: SPECIALIST

## 2018-10-11 PROCEDURE — 71260 CT THORAX DX C+: CPT

## 2018-10-11 RX ADMIN — IOHEXOL 100 ML: 350 INJECTION, SOLUTION INTRAVENOUS at 14:04

## 2018-10-11 RX ADMIN — IOHEXOL 50 ML: 240 INJECTION, SOLUTION INTRATHECAL; INTRAVASCULAR; INTRAVENOUS; ORAL at 12:29

## 2018-11-13 ENCOUNTER — HOSPITAL ENCOUNTER (OUTPATIENT)
Dept: CARDIOLOGY | Facility: MEDICAL CENTER | Age: 73
End: 2018-11-13
Attending: INTERNAL MEDICINE
Payer: MEDICARE

## 2018-11-13 DIAGNOSIS — I35.0 AORTIC STENOSIS, MILD: ICD-10-CM

## 2018-11-13 LAB
LV EJECT FRACT  99904: 60
LV EJECT FRACT MOD 2C 99903: 67.07
LV EJECT FRACT MOD 4C 99902: 57.7
LV EJECT FRACT MOD BP 99901: 60.46

## 2018-11-13 PROCEDURE — 93306 TTE W/DOPPLER COMPLETE: CPT

## 2018-11-13 PROCEDURE — 93306 TTE W/DOPPLER COMPLETE: CPT | Mod: 26 | Performed by: INTERNAL MEDICINE

## 2019-01-18 ENCOUNTER — SLEEP CENTER VISIT (OUTPATIENT)
Dept: SLEEP MEDICINE | Facility: MEDICAL CENTER | Age: 74
End: 2019-01-18
Payer: MEDICARE

## 2019-01-18 VITALS
WEIGHT: 155 LBS | HEIGHT: 64 IN | SYSTOLIC BLOOD PRESSURE: 148 MMHG | OXYGEN SATURATION: 97 % | DIASTOLIC BLOOD PRESSURE: 88 MMHG | RESPIRATION RATE: 16 BRPM | BODY MASS INDEX: 26.46 KG/M2 | HEART RATE: 69 BPM

## 2019-01-18 DIAGNOSIS — G47.33 OSA (OBSTRUCTIVE SLEEP APNEA): ICD-10-CM

## 2019-01-18 PROCEDURE — 99213 OFFICE O/P EST LOW 20 MIN: CPT | Performed by: NURSE PRACTITIONER

## 2019-01-18 RX ORDER — FLUVOXAMINE MALEATE 50 MG/1
TABLET, COATED ORAL
Refills: 0 | COMMUNITY
Start: 2019-01-14 | End: 2020-06-02

## 2019-01-18 RX ORDER — SOLIFENACIN SUCCINATE 5 MG/1
TABLET, FILM COATED ORAL
Refills: 1 | COMMUNITY
Start: 2018-11-19 | End: 2020-06-02

## 2019-01-18 RX ORDER — GABAPENTIN 300 MG/1
CAPSULE ORAL
Refills: 0 | COMMUNITY
Start: 2018-11-12 | End: 2019-01-09

## 2019-01-18 ASSESSMENT — ENCOUNTER SYMPTOMS
BRUISES/BLEEDS EASILY: 0
NEUROLOGICAL NEGATIVE: 1
CARDIOVASCULAR NEGATIVE: 1
PSYCHIATRIC NEGATIVE: 1
EYE DISCHARGE: 0
CONSTITUTIONAL NEGATIVE: 1
MUSCULOSKELETAL NEGATIVE: 1
RESPIRATORY NEGATIVE: 1
EYE PAIN: 0

## 2019-01-18 NOTE — PROGRESS NOTES
"Chief Complaint   Patient presents with   • Follow-Up     RICHMOND         HPI: This patient is a 73 y.o. female, who presents for follow-up of RICHMOND.     She is a long-standing history of sleep apnea initially diagnosed in 2009.  PSG indicates severe sleep apnea with an AHI of 32.9, minimum oxygen saturation of 84 %. she is compliant with CPAP 7 cm H2O. Compliance download over the past 30 days indicates 97 % compliance, average use of 9 hours per night, AHI of 1.4. Patient reports benefiting from therapy.  Denies EDS or a.m. headache.  She has a good amount of energy during the day.  She wakes up alert.  She has been receiving supplies regularly.  She denies significant changes to her health since she was last seen.      Past Medical History:   Diagnosis Date   • Anemia    • Anesthesia     \"very low blood pressure\"   • Arthritis     osteo, \"all over\"   • Breast cancer ([facility])     left   • Bronchitis    • Cancer ([facility])     skin   • Cancer ([facility]) 2015    breast   • Dental disorder     upper implants front teeth   • Depression    • GERD (gastroesophageal reflux disease)    • Heart burn    • Heart murmur    • Hypertension    • Indigestion    • Joint replacement     bilateral knees and right hip   • Nasal drainage    • Obesity    • Osteoporosis    • Psychiatric problem     anxiety/depression   • Restless leg syndrome    • Sleep apnea     CPAP   • Snoring    • Umbilical hernia 10/2016   • Unspecified urinary incontinence    • Urinary bladder disorder 6/21/2017    reports freq infections but none in the past year        Social History   Substance Use Topics   • Smoking status: Never Smoker   • Smokeless tobacco: Never Used   • Alcohol use Yes      Comment: 2-3 per month       Family History   Problem Relation Age of Onset   • Kidney Disease Mother    • Heart Failure Father        Immunization History   Administered Date(s) Administered   • Influenza Seasonal Injectable 10/10/2014   • Influenza TIV (IM) 10/10/2014, 10/01/2018 "   • Pneumococcal Conjugate Vaccine (Prevnar/PCV-13) 08/10/2013   • Pneumococcal Vaccine (PCV7) Historical Data 10/01/2018       Current medications as of today   Current Outpatient Prescriptions   Medication Sig Dispense Refill   • VESICARE 5 MG tablet   1   • fluvoxAMINE (LUVOX) 50 MG Tab take 1 to 2 tablets by mouth once daily for OCD AND COMPULSIVE URGES  0   • Cholecalciferol (VITAMIN D3 PO) Take 4,000 Units by mouth.     • Coenzyme Q10 (COQ10 PO) Take 300 mg by mouth every day.     • Cyanocobalamin (VITAMIN B-12) 1000 MCG Tab Take 2,000 mcg by mouth every day.     • Multiple Vitamins-Minerals (MULTIVITAMIN ADULT PO) Take  by mouth every day.     • aspirin 81 MG tablet Take 81 mg by mouth every day.     • ferrous gluconate (FERGON) 324 (38 FE) MG Tab Take 324 mg by mouth every morning with breakfast.     • aripiprazole (ABILIFY) 5 MG tablet Take 5 mg by mouth every morning. Indications: Major Depressive Disorder     • zolpidem (AMBIEN CR) 12.5 MG CR tablet Take 12.5 mg by mouth at bedtime as needed for Sleep. Indications: Trouble Sleeping     • Desvenlafaxine Succinate (PRISTIQ) 100 MG TB24 Take 1 Tab by mouth every morning. Indications: Major Depressive Disorder     • esomeprazole (NEXIUM) 40 MG delayed-release capsule Take 40 mg by mouth every morning. Indications: Gastroesophageal Reflux Disease with Current Symptoms     • alprazolam (XANAX) 0.5 MG TABS Take 0.5 mg by mouth as needed for Anxiety. Indications: Feeling Anxious     • Probiotic Product (PROBIOTIC DAILY PO) Take 1 Cap by mouth every day.     • CELEBREX 200 MG PO CAPS Take 200 mg by mouth. Takes 3x per week on Mon, Wed, Fri  Indications: Joint Damage causing Pain and Loss of Function     • FLONASE 50 MCG/ACT NA SUSP Spray 1 Spray in nose as needed. Indications: Hayfever     • acetaminophen (TYLENOL) 500 MG Tab Take 500-1,000 mg by mouth every 6 hours as needed.       No current facility-administered medications for this visit.        Allergies:  "Clindamycin; Levaquin; Penicillins; Ampicillin; and Demerol    Blood pressure 148/88, pulse 69, resp. rate 16, height 1.626 m (5' 4\"), weight 70.3 kg (155 lb), SpO2 97 %, not currently breastfeeding.      Review of Systems   Constitutional: Negative.    HENT: Negative.    Eyes: Negative for pain and discharge.   Respiratory: Negative.    Cardiovascular: Negative.    Musculoskeletal: Negative.    Neurological: Negative.    Endo/Heme/Allergies: Negative for environmental allergies. Does not bruise/bleed easily.   Psychiatric/Behavioral: Negative.        Physical Exam   Constitutional: She is oriented to person, place, and time and well-developed, well-nourished, and in no distress.   HENT:   Head: Normocephalic and atraumatic.   Eyes: Pupils are equal, round, and reactive to light.   Neck: Normal range of motion. Neck supple. No tracheal deviation present.   Cardiovascular: Normal rate, regular rhythm and normal heart sounds.    Pulmonary/Chest: Effort normal and breath sounds normal. No respiratory distress. She has no wheezes. She has no rales.   Musculoskeletal: Normal range of motion.   Neurological: She is alert and oriented to person, place, and time.   Skin: Skin is warm and dry.   Psychiatric: Mood, memory, affect and judgment normal.   Vitals reviewed.      Diagnoses/Plan:    1. RICHMOND (obstructive sleep apnea)   Continue CPAP nightly, Clean mask & tubing weekly, Replace supplies as insurance will allow, RX for new supplies to DME  - DME Mask and Supplies    Follow with PCP for routine health maintenance    Follow-up at the sleep center annually          This dictation was created using voice recognition software. The accuracy of the dictation is limited to the abilities of the software. I expect there may be some errors of grammar and possibly content.     "

## 2019-03-05 ENCOUNTER — OFFICE VISIT (OUTPATIENT)
Dept: CARDIOLOGY | Facility: MEDICAL CENTER | Age: 74
End: 2019-03-05
Payer: MEDICARE

## 2019-03-05 VITALS
WEIGHT: 149 LBS | OXYGEN SATURATION: 95 % | SYSTOLIC BLOOD PRESSURE: 138 MMHG | HEIGHT: 64 IN | HEART RATE: 78 BPM | DIASTOLIC BLOOD PRESSURE: 80 MMHG | BODY MASS INDEX: 25.44 KG/M2

## 2019-03-05 DIAGNOSIS — I65.23 BILATERAL CAROTID ARTERY STENOSIS: Chronic | ICD-10-CM

## 2019-03-05 DIAGNOSIS — I35.0 MODERATE AORTIC STENOSIS: Chronic | ICD-10-CM

## 2019-03-05 PROCEDURE — 99214 OFFICE O/P EST MOD 30 MIN: CPT | Performed by: INTERNAL MEDICINE

## 2019-03-05 RX ORDER — SULFAMETHOXAZOLE AND TRIMETHOPRIM 800; 160 MG/1; MG/1
TABLET ORAL
Refills: 0 | COMMUNITY
Start: 2019-02-25 | End: 2019-03-05

## 2019-03-05 NOTE — LETTER
"     [facility]   [address], [address]  [place] [address]  Phone: [phone]  Fax: [phone]              Tiffany Louis  1945    Encounter Date: 3/5/2019    Evelio Alfaro M.D.          PROGRESS NOTE:  Chief Complaint   Patient presents with   • Aortic Stenosis     Follow up       Subjective:   Tiffany Louis is a 73 y.o. female who presents today for follow-up of her history of aortic stenosis    She is been doing well echocardiogram in November show progression of her aortic stenosis now moderate    Past Medical History:   Diagnosis Date   • Anemia    • Anesthesia     \"very low blood pressure\"   • Arthritis     osteo, \"all over\"   • Bilateral carotid artery stenosis    • Breast cancer (HCC)     left   • Bronchitis    • Cancer (HCC)     skin   • Cancer (HCC) 2015    breast   • Dental disorder     upper implants front teeth   • Depression    • GERD (gastroesophageal reflux disease)    • Heart burn    • Heart murmur    • Hypertension    • Indigestion    • Joint replacement     bilateral knees and right hip   • Moderate aortic stenosis    • Nasal drainage    • Obesity    • Osteoporosis    • Psychiatric problem     anxiety/depression   • Restless leg syndrome    • Sleep apnea     CPAP   • Snoring    • Umbilical hernia 10/2016   • Unspecified urinary incontinence    • Urinary bladder disorder 6/21/2017    reports freq infections but none in the past year      Past Surgical History:   Procedure Laterality Date   • LATISSIMUS FLAP Left 6/27/2017    Procedure: LATISSIMUS FLAP W/INSERTION OF IMPLANT;  Surgeon: Johnny Flannery M.D.;  Location: SURGERY [facility];  Service:    • BREAST IMPLANT REMOVAL Left 6/27/2017    Procedure: BREAST IMPLANT REMOVAL;  Surgeon: Johnny Flannery M.D.;  Location: SUR[facility]ERY [facility];  Service:    • CAPSULECTOMY  4/13/2017    and debridement left breast   • BOWEL RESECTION  12/28/2016   • BREAST " RECONSTRUCTION Bilateral 11/29/2016    Procedure: BREAST RECONSTRUCTION;  Surgeon: Johnny Flannery M.D.;  Location: [facility];  Service:    • LATISSIMUS FLAP Right 11/29/2016    Procedure: LATISSIMUS FLAP W/ IMPLANT;  Surgeon: Johnny Flannery M.D.;  Location: [facility];  Service:    • TISSUE EXPANDER PLACE/REMOVE Left 11/29/2016    Procedure: TISSUE EXPANDER REMOVE - REMOVAL AND INSERTION OF IMPLANT;  Surgeon: Johnny Flannery M.D.;  Location: [facility];  Service:    • CAPSULECTOMY Bilateral 11/29/2016    Procedure: CAPSULECTOMY;  Surgeon: Johnny Flannery M.D.;  Location: [facility];  Service:    • OTHER SURGICAL PROCEDURE Right 7/2016    debridement and breast expander placement   • BREAST IMPLANT REMOVAL Right 1/6/2016    Procedure: BREAST IMPLANT REMOVAL, placement of drain;  Surgeon: Jon Leblanc M.D.;  Location: [facility];  Service:    • OTHER SURGICAL PROCEDURE Right 1/2016     second debridement right breast   • CATH PLACEMENT Right 9/8/2015    Procedure: CATH PLACEMENT PORT;  Surgeon: Vargas Hyde M.D.;  Location: [facility];  Service:    • OTHER SURGICAL PROCEDURE  9/8/2015    Port placement for chemo   • MASTECTOMY MODIFIED RADICAL Left 8/10/2015    Procedure: MASTECTOMY MODIFIED RADICAL;  Surgeon: Vargas Hyde M.D.;  Location: [facility];  Service:    • MASTECTOMY Right 8/10/2015    Procedure: MASTECTOMY SIMPLE;  Surgeon: Vargas Hyde M.D.;  Location: [facility];  Service:    • NODE BIOPSY SENTINEL Left 8/10/2015    Procedure: NODE BIOPSY SENTINEL;  Surgeon: Vargas Hyde M.D.;  Location: [facility];  Service:    • BREAST RECONSTRUCTION Bilateral 8/10/2015    Procedure: BREAST RECONSTRUCTION VIA;  Surgeon: Johnny Flannery M.D.;  Location: [facility];  Service:    • TISSUE EXPANDER PLACE/REMOVE Bilateral 8/10/2015    Procedure: TISSUE EXPANDER  "PLACE/REMOVE & POSSIBLE ALLODERM;  Surgeon: Johnny Flannery M.D.;  Location: SURGERY [facility];  Service:    • ROTATOR CUFF REPAIR Right 2009    right   • HIP ARTHROPLASTY TOTAL Right 2008    Hip Replacement, Total, right   • HAND SURGERY Right 2/12/07    joint Right Thumb   • ABDOMINOPLASTY  10/28/04   • GASTRIC BYPASS LAPAROSCOPIC  10/10/01   • KNEE ARTHROPLASTY TOTAL Bilateral 1/24/00    bilateral knees   • OTHER  9/99    tumor exc Right hip   • INGRID BY LAPAROSCOPY  9/97   • BLADDER SUSPENSION  12/93   • HYSTERECTOMY, TOTAL ABDOMINAL  5/93    rectal, bladder repair   • EAR MIDDLE EXPLORATION Left 4/90    Left   • BREAST BIOPSY Left 6/85   • TUBAL LIGATION  3/84   • ARTHROSCOPY, KNEE     • CHOLECYSTECTOMY     • COLON RESECTION     • HIP REPLACEMENT, TOTAL     • KNEE ARTHROSCOPY Left 1983, 12/97    Left   • KNEE ARTHROSCOPY Right 6/88, 12/91, 11/97    Right   • OTHER  5/97, 4/05/05    vaginal repair   • OTHER SURGICAL PROCEDURE  4/06, 10/06    bilateral \"arm lift\"   • SLEEVE,ARLEEN VASO THIGH     • TONSILLECTOMY       Family History   Problem Relation Age of Onset   • Kidney Disease Mother    • Heart Failure Father      Social History     Social History   • Marital status:      Spouse name: N/A   • Number of children: N/A   • Years of education: N/A     Occupational History   • Not on file.     Social History Main Topics   • Smoking status: Never Smoker   • Smokeless tobacco: Never Used   • Alcohol use Yes      Comment: 2-3 per month   • Drug use: No   • Sexual activity: Not on file     Other Topics Concern   • Not on file     Social History Narrative   • No narrative on file     Allergies   Allergen Reactions   • Clindamycin Shortness of Breath and Rash     .   • Levaquin Shortness of Breath   • Penicillins Shortness of Breath and Rash     Tolerated Ancef 08/10/15   • Ampicillin      Tolerated Cefazolin on 08/10/15   • Demerol Itching     Itching and rash       Outpatient Encounter Prescriptions as of " 3/5/2019   Medication Sig Dispense Refill   • VESICARE 5 MG tablet   1   • fluvoxAMINE (LUVOX) 50 MG Tab take 1 to 2 tablets by mouth once daily for OCD AND COMPULSIVE URGES  0   • Cholecalciferol (VITAMIN D3 PO) Take 4,000 Units by mouth.     • Coenzyme Q10 (COQ10 PO) Take 300 mg by mouth every day.     • Cyanocobalamin (VITAMIN B-12) 1000 MCG Tab Take 2,000 mcg by mouth every day.     • Multiple Vitamins-Minerals (MULTIVITAMIN ADULT PO) Take  by mouth every day.     • aspirin 81 MG tablet Take 81 mg by mouth every day.     • ferrous gluconate (FERGON) 324 (38 FE) MG Tab Take 324 mg by mouth every morning with breakfast.     • acetaminophen (TYLENOL) 500 MG Tab Take 500-1,000 mg by mouth every 6 hours as needed.     • aripiprazole (ABILIFY) 5 MG tablet Take 5 mg by mouth every morning. Indications: Major Depressive Disorder     • zolpidem (AMBIEN CR) 12.5 MG CR tablet Take 12.5 mg by mouth at bedtime as needed for Sleep. Indications: Trouble Sleeping     • Desvenlafaxine Succinate (PRISTIQ) 100 MG TB24 Take 1 Tab by mouth every morning. Indications: Major Depressive Disorder     • esomeprazole (NEXIUM) 40 MG delayed-release capsule Take 40 mg by mouth every morning. Indications: Gastroesophageal Reflux Disease with Current Symptoms     • alprazolam (XANAX) 0.5 MG TABS Take 0.5 mg by mouth as needed for Anxiety. Indications: Feeling Anxious     • Probiotic Product (PROBIOTIC DAILY PO) Take 1 Cap by mouth every day.     • CELEBREX 200 MG PO CAPS Take 200 mg by mouth. Takes 3x per week on Mon, Wed, Fri  Indications: Joint Damage causing Pain and Loss of Function     • FLONASE 50 MCG/ACT NA SUSP Spray 1 Spray in nose as needed. Indications: Hayfever     • FLECTOR 1.3 % Patch apply 1 patch to affected area twice a day  0   • [DISCONTINUED] sulfamethoxazole-trimethoprim (BACTRIM DS) 800-160 MG tablet take 1 tablet by mouth every 12 hours for 5 days  0     No facility-administered encounter medications on file as of  "3/5/2019.      Review of Systems   Constitutional: Negative for chills and fever.   HENT: Negative for sore throat.    Eyes: Negative for blurred vision.   Respiratory: Negative for cough and shortness of breath.    Cardiovascular: Negative for chest pain, palpitations, claudication, leg swelling and PND.   Gastrointestinal: Negative for abdominal pain and nausea.   Musculoskeletal: Negative for falls and joint pain.   Skin: Negative for rash.   Neurological: Negative for dizziness, focal weakness and weakness.   Endo/Heme/Allergies: Does not bruise/bleed easily.        Objective:   /80 (BP Location: Left arm, Patient Position: Sitting, BP Cuff Size: Adult)   Pulse 78   Ht 1.626 m (5' 4\")   Wt 67.6 kg (149 lb)   SpO2 95%   BMI 25.58 kg/m²      Physical Exam   Constitutional: No distress.   HENT:   Mouth/Throat: Oropharynx is clear and moist. No oropharyngeal exudate.   Eyes: No scleral icterus.   Neck: No JVD present.   Cardiovascular: Normal rate.  Exam reveals no gallop and no friction rub.    Murmur heard.  Pulmonary/Chest: No respiratory distress. She has no wheezes. She has no rales.   Abdominal: Soft. Bowel sounds are normal.   Musculoskeletal: She exhibits no edema.   Neurological: She is alert.   Skin: No rash noted. She is not diaphoretic.   Psychiatric: She has a normal mood and affect.     We reviewed the pictures of her echocardiogram    We reviewed in person the recent labs  Recent Results (from the past 4032 hour(s))   EC-ECHOCARDIOGRAM COMPLETE W/O CONT    Collection Time: 11/13/18  4:56 PM   Result Value Ref Range    Eject.Frac. MOD BP 60.46     Eject.Frac. MOD 4C 57.7     Eject.Frac. MOD 2C 67.07     Left Ventrical Ejection Fraction 60        Assessment:     1. Moderate aortic stenosis  EC-ECHOCARDIOGRAM COMPLETE W/O CONT   2. Bilateral carotid artery stenosis         Medical Decision Making:  Today's Assessment / Status / Plan:     It was my pleasure to meet with Ms. Louis.    Blood " pressure is well controlled.      For moderate aortic stenosis we discussed the expected progression we will repeat an echocardiogram next year prior to follow-up she would likely be a TAVR candidate eventually    I will see Ms. Louis back in 1 year time and encouraged her to follow up with us over the phone or e-mail using my MyChart as issues arise.    It is my pleasure to participate in the care of Ms. Louis.  Please do not hesitate to contact me with questions or concerns.    Evelio Alfaro MD PhD [facility]  Cardiologist [facility]    Please note that this dictation was created using voice recognition software. I have worked with consultants from the vendor as well as technical experts from [facility] to optimize the interface. I have made every reasonable attempt to correct obvious errors, but I expect that there are errors of grammar and possibly content I did not discover before finalizing the note.         Gerber Mathew M.D.  [address] [place] [address]  VIA Facsimile: [phone]

## 2019-03-20 ASSESSMENT — ENCOUNTER SYMPTOMS
SHORTNESS OF BREATH: 0
SORE THROAT: 0
NAUSEA: 0
BLURRED VISION: 0
BRUISES/BLEEDS EASILY: 0
PND: 0
COUGH: 0
CLAUDICATION: 0
WEAKNESS: 0
DIZZINESS: 0
ABDOMINAL PAIN: 0
FEVER: 0
CHILLS: 0
PALPITATIONS: 0
FOCAL WEAKNESS: 0
FALLS: 0

## 2019-03-21 NOTE — PROGRESS NOTES
"Chief Complaint   Patient presents with   • Aortic Stenosis     Follow up       Subjective:   Tiffany Louis is a 73 y.o. female who presents today for follow-up of her history of aortic stenosis    She is been doing well echocardiogram in November show progression of her aortic stenosis now moderate    Past Medical History:   Diagnosis Date   • Anemia    • Anesthesia     \"very low blood pressure\"   • Arthritis     osteo, \"all over\"   • Bilateral carotid artery stenosis    • Breast cancer ([facility])     left   • Bronchitis    • Cancer (HCC)     skin   • Cancer (HCC) 2015    breast   • Dental disorder     upper implants front teeth   • Depression    • GERD (gastroesophageal reflux disease)    • Heart burn    • Heart murmur    • Hypertension    • Indigestion    • Joint replacement     bilateral knees and right hip   • Moderate aortic stenosis    • Nasal drainage    • Obesity    • Osteoporosis    • Psychiatric problem     anxiety/depression   • Restless leg syndrome    • Sleep apnea     CPAP   • Snoring    • Umbilical hernia 10/2016   • Unspecified urinary incontinence    • Urinary bladder disorder 6/21/2017    reports freq infections but none in the past year      Past Surgical History:   Procedure Laterality Date   • LATISSIMUS FLAP Left 6/27/2017    Procedure: LATISSIMUS FLAP W/INSERTION OF IMPLANT;  Surgeon: Johnny Flannery M.D.;  Location: [facility];  Service:    • BREAST IMPLANT REMOVAL Left 6/27/2017    Procedure: BREAST IMPLANT REMOVAL;  Surgeon: Johnny Flannery M.D.;  Location: [facility];  Service:    • CAPSULECTOMY  4/13/2017    and debridement left breast   • BOWEL RESECTION  12/28/2016   • BREAST RECONSTRUCTION Bilateral 11/29/2016    Procedure: BREAST RECONSTRUCTION;  Surgeon: Johnny Flannery M.D.;  Location: [facility];  Service:    • LATISSIMUS FLAP Right 11/29/2016    Procedure: LATISSIMUS FLAP W/ IMPLANT;  Surgeon: Johnny Flannery M.D.;  Location: " [facility];  Service:    • TISSUE EXPANDER PLACE/REMOVE Left 11/29/2016    Procedure: TISSUE EXPANDER REMOVE - REMOVAL AND INSERTION OF IMPLANT;  Surgeon: Johnny Flannery M.D.;  Location: [facility];  Service:    • CAPSULECTOMY Bilateral 11/29/2016    Procedure: CAPSULECTOMY;  Surgeon: Johnny Flannery M.D.;  Location: [facility];  Service:    • OTHER SURGICAL PROCEDURE Right 7/2016    debridement and breast expander placement   • BREAST IMPLANT REMOVAL Right 1/6/2016    Procedure: BREAST IMPLANT REMOVAL, placement of drain;  Surgeon: Jon Leblanc M.D.;  Location: [facility];  Service:    • OTHER SURGICAL PROCEDURE Right 1/2016     second debridement right breast   • CATH PLACEMENT Right 9/8/2015    Procedure: CATH PLACEMENT PORT;  Surgeon: Vargas Hyde M.D.;  Location: [facility];  Service:    • OTHER SURGICAL PROCEDURE  9/8/2015    Port placement for chemo   • MASTECTOMY MODIFIED RADICAL Left 8/10/2015    Procedure: MASTECTOMY MODIFIED RADICAL;  Surgeon: Vargas Hyde M.D.;  Location: [facility];  Service:    • MASTECTOMY Right 8/10/2015    Procedure: MASTECTOMY SIMPLE;  Surgeon: Vargas Hyde M.D.;  Location: [facility];  Service:    • NODE BIOPSY SENTINEL Left 8/10/2015    Procedure: NODE BIOPSY SENTINEL;  Surgeon: Vargas Hyde M.D.;  Location: [facility];  Service:    • BREAST RECONSTRUCTION Bilateral 8/10/2015    Procedure: BREAST RECONSTRUCTION VIA;  Surgeon: Johnny Flannery M.D.;  Location: [facility];  Service:    • TISSUE EXPANDER PLACE/REMOVE Bilateral 8/10/2015    Procedure: TISSUE EXPANDER PLACE/REMOVE & POSSIBLE ALLODERM;  Surgeon: Johnny Flannery M.D.;  Location: [facility];  Service:    • ROTATOR CUFF REPAIR Right 2009    right   • HIP ARTHROPLASTY TOTAL Right 2008    Hip Replacement, Total, right   • HAND SURGERY Right 2/12/07    joint Right Thumb  "  • ABDOMINOPLASTY  10/28/04   • GASTRIC BYPASS LAPAROSCOPIC  10/10/01   • KNEE ARTHROPLASTY TOTAL Bilateral 1/24/00    bilateral knees   • OTHER  9/99    tumor exc Right hip   • INGRID BY LAPAROSCOPY  9/97   • BLADDER SUSPENSION  12/93   • HYSTERECTOMY, TOTAL ABDOMINAL  5/93    rectal, bladder repair   • EAR MIDDLE EXPLORATION Left 4/90    Left   • BREAST BIOPSY Left 6/85   • TUBAL LIGATION  3/84   • ARTHROSCOPY, KNEE     • CHOLECYSTECTOMY     • COLON RESECTION     • HIP REPLACEMENT, TOTAL     • KNEE ARTHROSCOPY Left 1983, 12/97    Left   • KNEE ARTHROSCOPY Right 6/88, 12/91, 11/97    Right   • OTHER  5/97, 4/05/05    vaginal repair   • OTHER SURGICAL PROCEDURE  4/06, 10/06    bilateral \"arm lift\"   • SLEEVE,ARLEEN VASO THIGH     • TONSILLECTOMY       Family History   Problem Relation Age of Onset   • Kidney Disease Mother    • Heart Failure Father      Social History     Social History   • Marital status:      Spouse name: N/A   • Number of children: N/A   • Years of education: N/A     Occupational History   • Not on file.     Social History Main Topics   • Smoking status: Never Smoker   • Smokeless tobacco: Never Used   • Alcohol use Yes      Comment: 2-3 per month   • Drug use: No   • Sexual activity: Not on file     Other Topics Concern   • Not on file     Social History Narrative   • No narrative on file     Allergies   Allergen Reactions   • Clindamycin Shortness of Breath and Rash     .   • Levaquin Shortness of Breath   • Penicillins Shortness of Breath and Rash     Tolerated Ancef 08/10/15   • Ampicillin      Tolerated Cefazolin on 08/10/15   • Demerol Itching     Itching and rash       Outpatient Encounter Prescriptions as of 3/5/2019   Medication Sig Dispense Refill   • VESICARE 5 MG tablet   1   • fluvoxAMINE (LUVOX) 50 MG Tab take 1 to 2 tablets by mouth once daily for OCD AND COMPULSIVE URGES  0   • Cholecalciferol (VITAMIN D3 PO) Take 4,000 Units by mouth.     • Coenzyme Q10 (COQ10 PO) Take 300 " mg by mouth every day.     • Cyanocobalamin (VITAMIN B-12) 1000 MCG Tab Take 2,000 mcg by mouth every day.     • Multiple Vitamins-Minerals (MULTIVITAMIN ADULT PO) Take  by mouth every day.     • aspirin 81 MG tablet Take 81 mg by mouth every day.     • ferrous gluconate (FERGON) 324 (38 FE) MG Tab Take 324 mg by mouth every morning with breakfast.     • acetaminophen (TYLENOL) 500 MG Tab Take 500-1,000 mg by mouth every 6 hours as needed.     • aripiprazole (ABILIFY) 5 MG tablet Take 5 mg by mouth every morning. Indications: Major Depressive Disorder     • zolpidem (AMBIEN CR) 12.5 MG CR tablet Take 12.5 mg by mouth at bedtime as needed for Sleep. Indications: Trouble Sleeping     • Desvenlafaxine Succinate (PRISTIQ) 100 MG TB24 Take 1 Tab by mouth every morning. Indications: Major Depressive Disorder     • esomeprazole (NEXIUM) 40 MG delayed-release capsule Take 40 mg by mouth every morning. Indications: Gastroesophageal Reflux Disease with Current Symptoms     • alprazolam (XANAX) 0.5 MG TABS Take 0.5 mg by mouth as needed for Anxiety. Indications: Feeling Anxious     • Probiotic Product (PROBIOTIC DAILY PO) Take 1 Cap by mouth every day.     • CELEBREX 200 MG PO CAPS Take 200 mg by mouth. Takes 3x per week on Mon, Wed, Fri  Indications: Joint Damage causing Pain and Loss of Function     • FLONASE 50 MCG/ACT NA SUSP Spray 1 Spray in nose as needed. Indications: Hayfever     • FLECTOR 1.3 % Patch apply 1 patch to affected area twice a day  0   • [DISCONTINUED] sulfamethoxazole-trimethoprim (BACTRIM DS) 800-160 MG tablet take 1 tablet by mouth every 12 hours for 5 days  0     No facility-administered encounter medications on file as of 3/5/2019.      Review of Systems   Constitutional: Negative for chills and fever.   HENT: Negative for sore throat.    Eyes: Negative for blurred vision.   Respiratory: Negative for cough and shortness of breath.    Cardiovascular: Negative for chest pain, palpitations, claudication,  "leg swelling and PND.   Gastrointestinal: Negative for abdominal pain and nausea.   Musculoskeletal: Negative for falls and joint pain.   Skin: Negative for rash.   Neurological: Negative for dizziness, focal weakness and weakness.   Endo/Heme/Allergies: Does not bruise/bleed easily.        Objective:   /80 (BP Location: Left arm, Patient Position: Sitting, BP Cuff Size: Adult)   Pulse 78   Ht 1.626 m (5' 4\")   Wt 67.6 kg (149 lb)   SpO2 95%   BMI 25.58 kg/m²     Physical Exam   Constitutional: No distress.   HENT:   Mouth/Throat: Oropharynx is clear and moist. No oropharyngeal exudate.   Eyes: No scleral icterus.   Neck: No JVD present.   Cardiovascular: Normal rate.  Exam reveals no gallop and no friction rub.    Murmur heard.  Pulmonary/Chest: No respiratory distress. She has no wheezes. She has no rales.   Abdominal: Soft. Bowel sounds are normal.   Musculoskeletal: She exhibits no edema.   Neurological: She is alert.   Skin: No rash noted. She is not diaphoretic.   Psychiatric: She has a normal mood and affect.     We reviewed the pictures of her echocardiogram    We reviewed in person the recent labs  Recent Results (from the past 4032 hour(s))   EC-ECHOCARDIOGRAM COMPLETE W/O CONT    Collection Time: 11/13/18  4:56 PM   Result Value Ref Range    Eject.Frac. MOD BP 60.46     Eject.Frac. MOD 4C 57.7     Eject.Frac. MOD 2C 67.07     Left Ventrical Ejection Fraction 60        Assessment:     1. Moderate aortic stenosis  EC-ECHOCARDIOGRAM COMPLETE W/O CONT   2. Bilateral carotid artery stenosis         Medical Decision Making:  Today's Assessment / Status / Plan:     It was my pleasure to meet with Ms. Louis.    Blood pressure is well controlled.      For moderate aortic stenosis we discussed the expected progression we will repeat an echocardiogram next year prior to follow-up she would likely be a TAVR candidate eventually    I will see Ms. Louis back in 1 year time and encouraged her to follow up " with us over the phone or e-mail using my [employer] as issues arise.    It is my pleasure to participate in the care of Ms. Louis.  Please do not hesitate to contact me with questions or concerns.    Evelio Alfaro MD PhD [facility]  Cardiologist [facility]    Please note that this dictation was created using voice recognition software. I have worked with consultants from the vendor as well as technical experts from [facility] to optimize the interface. I have made every reasonable attempt to correct obvious errors, but I expect that there are errors of grammar and possibly content I did not discover before finalizing the note.

## 2019-10-29 ENCOUNTER — HOSPITAL ENCOUNTER (OUTPATIENT)
Dept: RADIOLOGY | Facility: MEDICAL CENTER | Age: 74
End: 2019-10-29
Attending: INTERNAL MEDICINE
Payer: MEDICARE

## 2019-10-29 DIAGNOSIS — C50.912 MALIGNANT NEOPLASM OF LEFT FEMALE BREAST, UNSPECIFIED ESTROGEN RECEPTOR STATUS, UNSPECIFIED SITE OF BREAST (HCC): ICD-10-CM

## 2019-10-29 PROCEDURE — 71260 CT THORAX DX C+: CPT

## 2019-10-29 PROCEDURE — 700117 HCHG RX CONTRAST REV CODE 255: Performed by: INTERNAL MEDICINE

## 2019-10-29 RX ADMIN — IOHEXOL 100 ML: 350 INJECTION, SOLUTION INTRAVENOUS at 11:15

## 2019-10-29 RX ADMIN — IOHEXOL 25 ML: 240 INJECTION, SOLUTION INTRATHECAL; INTRAVASCULAR; INTRAVENOUS; ORAL at 11:15

## 2020-06-02 ENCOUNTER — HOSPITAL ENCOUNTER (OUTPATIENT)
Dept: LAB | Facility: MEDICAL CENTER | Age: 75
End: 2020-06-02
Attending: INTERNAL MEDICINE
Payer: MEDICARE

## 2020-06-02 ENCOUNTER — OFFICE VISIT (OUTPATIENT)
Dept: CARDIOLOGY | Facility: MEDICAL CENTER | Age: 75
End: 2020-06-02
Payer: MEDICARE

## 2020-06-02 VITALS
HEIGHT: 64 IN | WEIGHT: 153 LBS | OXYGEN SATURATION: 95 % | SYSTOLIC BLOOD PRESSURE: 116 MMHG | HEART RATE: 60 BPM | BODY MASS INDEX: 26.12 KG/M2 | DIASTOLIC BLOOD PRESSURE: 62 MMHG

## 2020-06-02 DIAGNOSIS — I35.0 MODERATE AORTIC STENOSIS: Chronic | ICD-10-CM

## 2020-06-02 DIAGNOSIS — I65.23 BILATERAL CAROTID ARTERY STENOSIS: Chronic | ICD-10-CM

## 2020-06-02 PROCEDURE — 87186 SC STD MICRODIL/AGAR DIL: CPT

## 2020-06-02 PROCEDURE — 87077 CULTURE AEROBIC IDENTIFY: CPT

## 2020-06-02 PROCEDURE — 99214 OFFICE O/P EST MOD 30 MIN: CPT | Performed by: INTERNAL MEDICINE

## 2020-06-02 PROCEDURE — 87086 URINE CULTURE/COLONY COUNT: CPT

## 2020-06-02 RX ORDER — FLUVOXAMINE MALEATE 50 MG/1
100 TABLET, COATED ORAL EVERY MORNING
COMMUNITY
End: 2021-09-07

## 2020-06-02 RX ORDER — ARIPIPRAZOLE 5 MG/1
TABLET ORAL
COMMUNITY
End: 2020-06-02

## 2020-06-02 RX ORDER — SOLIFENACIN SUCCINATE 10 MG/1
TABLET, FILM COATED ORAL
COMMUNITY
End: 2020-06-11

## 2020-06-02 RX ORDER — TRIAMCINOLONE ACETONIDE 1 MG/G
CREAM TOPICAL PRN
Status: ON HOLD | COMMUNITY
Start: 2020-03-28 | End: 2022-01-26

## 2020-06-02 RX ORDER — SOLIFENACIN SUCCINATE 10 MG/1
TABLET, FILM COATED ORAL
COMMUNITY
Start: 2020-04-07 | End: 2020-06-02

## 2020-06-02 RX ORDER — LANOLIN ALCOHOL/MO/W.PET/CERES
400 CREAM (GRAM) TOPICAL DAILY
COMMUNITY

## 2020-06-02 RX ORDER — DESVENLAFAXINE 100 MG/1
1 TABLET, EXTENDED RELEASE ORAL EVERY MORNING
COMMUNITY

## 2020-06-02 RX ORDER — CELECOXIB 200 MG/1
CAPSULE ORAL
COMMUNITY
End: 2020-06-02

## 2020-06-02 RX ORDER — AMLODIPINE BESYLATE 10 MG/1
10 TABLET ORAL
COMMUNITY
Start: 2020-05-16 | End: 2020-06-02

## 2020-06-02 RX ORDER — ALPRAZOLAM 0.5 MG/1
TABLET ORAL
COMMUNITY
End: 2020-06-02

## 2020-06-02 RX ORDER — PHENAZOPYRIDINE HYDROCHLORIDE 200 MG/1
TABLET, FILM COATED ORAL
COMMUNITY
Start: 2020-05-12 | End: 2020-06-02

## 2020-06-02 RX ORDER — FLUTICASONE PROPIONATE 50 MCG
SPRAY, SUSPENSION (ML) NASAL
COMMUNITY
End: 2021-01-18 | Stop reason: SDUPTHER

## 2020-06-02 RX ORDER — LISINOPRIL 10 MG/1
10 TABLET ORAL
COMMUNITY
Start: 2020-05-22 | End: 2020-06-02

## 2020-06-02 RX ORDER — ESOMEPRAZOLE MAGNESIUM 40 MG/1
CAPSULE, DELAYED RELEASE ORAL
COMMUNITY
End: 2020-06-02

## 2020-06-02 ASSESSMENT — ENCOUNTER SYMPTOMS
BLURRED VISION: 0
CHILLS: 0
BRUISES/BLEEDS EASILY: 0
NAUSEA: 0
DIZZINESS: 0
FEVER: 0
PALPITATIONS: 0
FOCAL WEAKNESS: 0
COUGH: 0
WEAKNESS: 0
SHORTNESS OF BREATH: 1
CLAUDICATION: 0
ABDOMINAL PAIN: 0
FALLS: 0
SORE THROAT: 0
PND: 0

## 2020-06-02 NOTE — LETTER
"     [facility]   [address]   [address]  [place] [address]  Phone: [phone]  Fax: [phone]              Tiffany Louis  1945    Encounter Date: 6/2/2020    Evelio Alfaro M.D.          PROGRESS NOTE:  Chief Complaint   Patient presents with   • Aortic Stenosis     Follow up       Subjective:   Tiffany Louis is a 75 y.o. female who presents today for follow-up of her history of moderate aortic stenosis    She has been doing well over the last year no major health concerns she is following up with oncology today she had a CAT scan in October that was stable he did not show any coronary calcifications she reports increasing shortness of breath with activity over the past 4 months    Past Medical History:   Diagnosis Date   • Anemia    • Anesthesia     \"very low blood pressure\"   • Arthritis     osteo, \"all over\"   • Bilateral carotid artery stenosis    • Breast cancer (HCC)     left   • Bronchitis    • Cancer (HCC)     skin   • Cancer (HCC) 2015    breast   • Dental disorder     upper implants front teeth   • Depression    • GERD (gastroesophageal reflux disease)    • Heart burn    • Heart murmur    • Hypertension    • Indigestion    • Joint replacement     bilateral knees and right hip   • Moderate aortic stenosis    • Nasal drainage    • Obesity    • Osteoporosis    • Psychiatric problem     anxiety/depression   • Restless leg syndrome    • Sleep apnea     CPAP   • Snoring    • Umbilical hernia 10/2016   • Unspecified urinary incontinence    • Urinary bladder disorder 6/21/2017    reports freq infections but none in the past year      Past Surgical History:   Procedure Laterality Date   • LATISSIMUS FLAP Left 6/27/2017    Procedure: LATISSIMUS FLAP W/INSERTION OF IMPLANT;  Surgeon: Johnyn Flannery M.D.;  Location: SURGERY [facility];  Service:    • BREAST IMPLANT REMOVAL Left 6/27/2017    Procedure: BREAST IMPLANT " REMOVAL;  Surgeon: Johnny Flannery M.D.;  Location: [facility];  Service:    • CAPSULECTOMY  4/13/2017    and debridement left breast   • BOWEL RESECTION  12/28/2016   • BREAST RECONSTRUCTION Bilateral 11/29/2016    Procedure: BREAST RECONSTRUCTION;  Surgeon: Johnny Flannery M.D.;  Location: [facility];  Service:    • LATISSIMUS FLAP Right 11/29/2016    Procedure: LATISSIMUS FLAP W/ IMPLANT;  Surgeon: Johnny Flannery M.D.;  Location: [facility];  Service:    • TISSUE EXPANDER PLACE/REMOVE Left 11/29/2016    Procedure: TISSUE EXPANDER REMOVE - REMOVAL AND INSERTION OF IMPLANT;  Surgeon: Johnny Flannery M.D.;  Location: [facility];  Service:    • CAPSULECTOMY Bilateral 11/29/2016    Procedure: CAPSULECTOMY;  Surgeon: Johnny Flannery M.D.;  Location: [facility];  Service:    • OTHER SURGICAL PROCEDURE Right 7/2016    debridement and breast expander placement   • BREAST IMPLANT REMOVAL Right 1/6/2016    Procedure: BREAST IMPLANT REMOVAL, placement of drain;  Surgeon: Jon Leblanc M.D.;  Location: [facility];  Service:    • OTHER SURGICAL PROCEDURE Right 1/2016     second debridement right breast   • CATH PLACEMENT Right 9/8/2015    Procedure: CATH PLACEMENT PORT;  Surgeon: Vargas Hyde M.D.;  Location: [facility];  Service:    • OTHER SURGICAL PROCEDURE  9/8/2015    Port placement for chemo   • MASTECTOMY MODIFIED RADICAL Left 8/10/2015    Procedure: MASTECTOMY MODIFIED RADICAL;  Surgeon: Vargas Hyde M.D.;  Location: [facility];  Service:    • MASTECTOMY Right 8/10/2015    Procedure: MASTECTOMY SIMPLE;  Surgeon: Vargas Hyde M.D.;  Location: [facility];  Service:    • NODE BIOPSY SENTINEL Left 8/10/2015    Procedure: NODE BIOPSY SENTINEL;  Surgeon: Vargas Hyde M.D.;  Location: [facility];  Service:    • BREAST RECONSTRUCTION Bilateral 8/10/2015    Procedure:  "BREAST RECONSTRUCTION VIA;  Surgeon: Johnny Flannery M.D.;  Location: SURGERY [facility];  Service:    • TISSUE EXPANDER PLACE/REMOVE Bilateral 8/10/2015    Procedure: TISSUE EXPANDER PLACE/REMOVE & POSSIBLE ALLODERM;  Surgeon: Johnny Flannery M.D.;  Location: SURGERY [facility];  Service:    • ROTATOR CUFF REPAIR Right 2009    right   • HIP ARTHROPLASTY TOTAL Right 2008    Hip Replacement, Total, right   • HAND SURGERY Right 2/12/07    joint Right Thumb   • ABDOMINOPLASTY  10/28/04   • GASTRIC BYPASS LAPAROSCOPIC  10/10/01   • KNEE ARTHROPLASTY TOTAL Bilateral 1/24/00    bilateral knees   • OTHER  9/99    tumor exc Right hip   • INGRID BY LAPAROSCOPY  9/97   • BLADDER SUSPENSION  12/93   • HYSTERECTOMY, TOTAL ABDOMINAL  5/93    rectal, bladder repair   • EAR MIDDLE EXPLORATION Left 4/90    Left   • BREAST BIOPSY Left 6/85   • TUBAL LIGATION  3/84   • ARTHROSCOPY, KNEE     • CHOLECYSTECTOMY     • COLON RESECTION     • HIP REPLACEMENT, TOTAL     • KNEE ARTHROSCOPY Left 1983, 12/97    Left   • KNEE ARTHROSCOPY Right 6/88, 12/91, 11/97    Right   • OTHER  5/97, 4/05/05    vaginal repair   • OTHER SURGICAL PROCEDURE  4/06, 10/06    bilateral \"arm lift\"   • SLEEVE,ARLEEN VASO THIGH     • TONSILLECTOMY       Family History   Problem Relation Age of Onset   • Kidney Disease Mother    • Heart Failure Father      Social History     Socioeconomic History   • Marital status:      Spouse name: Not on file   • Number of children: Not on file   • Years of education: Not on file   • Highest education level: Not on file   Occupational History   • Not on file   Social Needs   • Financial resource strain: Not on file   • Food insecurity     Worry: Not on file     Inability: Not on file   • Transportation needs     Medical: Not on file     Non-medical: Not on file   Tobacco Use   • Smoking status: Never Smoker   • Smokeless tobacco: Never Used   Substance and Sexual Activity   • Alcohol use: Yes     Comment: 2-3 per " month   • Drug use: No   • Sexual activity: Not on file   Lifestyle   • Physical activity     Days per week: Not on file     Minutes per session: Not on file   • Stress: Not on file   Relationships   • Social connections     Talks on phone: Not on file     Gets together: Not on file     Attends Voodoo service: Not on file     Active member of club or organization: Not on file     Attends meetings of clubs or organizations: Not on file     Relationship status: Not on file   • Intimate partner violence     Fear of current or ex partner: Not on file     Emotionally abused: Not on file     Physically abused: Not on file     Forced sexual activity: Not on file   Other Topics Concern   • Not on file   Social History Narrative   • Not on file     Allergies   Allergen Reactions   • Clindamycin Shortness of Breath and Rash     .   • Levaquin Shortness of Breath   • Penicillins Shortness of Breath and Rash     Tolerated Ancef 08/10/15   • Amlodipine      swelling   • Ampicillin      Tolerated Cefazolin on 08/10/15   • Demerol Itching     Itching and rash     • Lisinopril Cough     Outpatient Encounter Medications as of 6/2/2020   Medication Sig Dispense Refill   • fluticasone (FLONASE ALLERGY RELIEF) 50 MCG/ACT nasal spray Flonase Allergy Relief     • triamcinolone acetonide (KENALOG) 0.1 % Cream apply to affected area twice a day 5 DAYS ON AND 2 DAYS OFF CYCLES if needed     • Desvenlafaxine Succinate (PRISTIQ) 100 MG TABLET SR 24 HR Pristiq 100 mg tablet,extended release   Take 1 tablet every day by oral route.     • fluvoxAMINE (LUVOX) 50 MG Tab Luvox 50 mg tablet   Take 2 tablets every day by oral route.     • folic acid (FOLATE) 400 MCG tablet Take 400 mcg by mouth 2 Times a Day.     • Coenzyme Q10 (COQ10 PO) Take 300 mg by mouth every day.     • Cyanocobalamin (VITAMIN B-12) 1000 MCG Tab Take 2,000 mcg by mouth every day.     • Multiple Vitamins-Minerals (MULTIVITAMIN ADULT PO) Take  by mouth every day.     • aspirin  81 MG tablet Take 81 mg by mouth every day.     • ferrous gluconate (FERGON) 324 (38 FE) MG Tab Take 324 mg by mouth every morning with breakfast.     • acetaminophen (TYLENOL) 500 MG Tab Take 500-1,000 mg by mouth every 6 hours as needed.     • aripiprazole (ABILIFY) 5 MG tablet Take 5 mg by mouth every morning. Indications: Major Depressive Disorder     • esomeprazole (NEXIUM) 40 MG delayed-release capsule Take 40 mg by mouth every morning. Indications: Gastroesophageal Reflux Disease with Current Symptoms     • alprazolam (XANAX) 0.5 MG TABS Take 0.5 mg by mouth as needed for Anxiety. Indications: Feeling Anxious     • Probiotic Product (PROBIOTIC DAILY PO) Take 1 Cap by mouth every day.     • CELEBREX 200 MG PO CAPS Take 200 mg by mouth. Takes 3x per week on Mon, Wed, Fri  Indications: Joint Damage causing Pain and Loss of Function     • solifenacin (VESICARE) 10 MG tablet Vesicare 10 mg tablet   Take 1 tablet every day by oral route.     • [DISCONTINUED] lisinopril (PRINIVIL) 10 MG Tab Take 10 mg by mouth.     • [DISCONTINUED] amLODIPine (NORVASC) 10 MG Tab Take 10 mg by mouth.     • [DISCONTINUED] phenazopyridine (PYRIDIUM) 200 MG Tab take 1 tablet by mouth three times a day after meals     • [DISCONTINUED] ALPRAZolam (XANAX) 0.5 MG Tab Xanax 0.5 mg tablet   Take 1 tablet as needed by oral route.     • [DISCONTINUED] aripiprazole (ABILIFY) 5 MG tablet Abilify 5 mg tablet   Take 1 tablet every day by oral route.     • [DISCONTINUED] celecoxib (CELEBREX) 200 MG Cap Celebrex 200 mg capsule   Take 1 capsule 3 times a week by oral route.     • [DISCONTINUED] esomeprazole (NEXIUM) 40 MG delayed-release capsule Nexium 40 mg capsule,delayed release   Take 1 capsule 3 times a week by oral route.     • [DISCONTINUED] solifenacin (VESICARE) 10 MG tablet      • FLECTOR 1.3 % Patch apply 1 patch to affected area twice a day  0   • [DISCONTINUED] VESICARE 5 MG tablet   1   • [DISCONTINUED] fluvoxAMINE (LUVOX) 50 MG Tab take  "1 to 2 tablets by mouth once daily for OCD AND COMPULSIVE URGES  0   • [DISCONTINUED] Cholecalciferol (VITAMIN D3 PO) Take 4,000 Units by mouth.     • [DISCONTINUED] zolpidem (AMBIEN CR) 12.5 MG CR tablet Take 12.5 mg by mouth at bedtime as needed for Sleep. Indications: Trouble Sleeping     • [DISCONTINUED] Desvenlafaxine Succinate (PRISTIQ) 100 MG TB24 Take 1 Tab by mouth every morning. Indications: Major Depressive Disorder     • [DISCONTINUED] FLONASE 50 MCG/ACT NA SUSP Spray 1 Spray in nose as needed. Indications: Hayfever       No facility-administered encounter medications on file as of 6/2/2020.      Review of Systems   Constitutional: Negative for chills and fever.   HENT: Negative for sore throat.    Eyes: Negative for blurred vision.   Respiratory: Positive for shortness of breath. Negative for cough.    Cardiovascular: Negative for chest pain, palpitations, claudication, leg swelling and PND.   Gastrointestinal: Negative for abdominal pain and nausea.   Musculoskeletal: Negative for falls and joint pain.   Skin: Negative for rash.   Neurological: Negative for dizziness, focal weakness and weakness.   Endo/Heme/Allergies: Does not bruise/bleed easily.        Objective:   /62 (BP Location: Left arm, Patient Position: Sitting, BP Cuff Size: Adult)   Pulse 60   Ht 1.626 m (5' 4\")   Wt 69.4 kg (153 lb)   SpO2 95%   BMI 26.26 kg/m²     Physical Exam   Constitutional: No distress.   HENT:   Mouth/Throat: Oropharynx is clear and moist. No oropharyngeal exudate.   Eyes: No scleral icterus.   Neck: No JVD present.   Cardiovascular: Normal rate. Exam reveals no gallop and no friction rub.   Murmur heard.  Pulmonary/Chest: No respiratory distress. She has no wheezes. She has no rales.   Abdominal: Soft. Bowel sounds are normal.   Musculoskeletal:         General: No edema.   Neurological: She is alert.   Skin: No rash noted. She is not diaphoretic.   Psychiatric: She has a normal mood and affect.     We " reviewed in person the most recent labs  Labs from [place] showed improving hyponatremia      Assessment:     1. Moderate aortic stenosis  EC-ECHOCARDIOGRAM COMPLETE W/O CONT   2. Bilateral carotid artery stenosis         Medical Decision Making:  Today's Assessment / Status / Plan:     It was my pleasure to meet with Ms. Louis.    Based on her exam her aortic stenosis may have progressed as well as her symptoms so we will check an echocardiogram to [place] if this were to show severe aortic stenosis we will begin with the valve clinic if there remains moderate we will continue regular surveillance    Blood pressure is well controlled.  We specifically assessed the labs on hypertension treatment    She has been off of blood pressure agents with good blood pressures which is great news    I will see Ms. Louis back in 1 year time and encouraged her to follow up with us over the phone or electronically using my MyChart as issues arise.    It is my pleasure to participate in the care of Ms. Louis.  Please do not hesitate to contact me with questions or concerns.    Evelio Alfaro MD PhD [facility]  Cardiologist [facility]    Please note that this dictation was created using voice recognition software. There may be errors I did not discover before finalizing the note.         Denise Mcnulty M.D.  [address] [place] [address] [place] [address]  VIA Facsimile: [phone]     Gerber Mathew M.D.  [address] [place] [place] [address]  VIA Facsimile: [phone]     Shannon Fuentes M.D.  [address] [place] [address]  VIA Facsimile: [phone]

## 2020-06-02 NOTE — PROGRESS NOTES
"Chief Complaint   Patient presents with   • Aortic Stenosis     Follow up       Subjective:   Tiffany Louis is a 75 y.o. female who presents today for follow-up of her history of moderate aortic stenosis    She has been doing well over the last year no major health concerns she is following up with oncology today she had a CAT scan in October that was stable he did not show any coronary calcifications she reports increasing shortness of breath with activity over the past 4 months    Past Medical History:   Diagnosis Date   • Anemia    • Anesthesia     \"very low blood pressure\"   • Arthritis     osteo, \"all over\"   • Bilateral carotid artery stenosis    • Breast cancer (HCC)     left   • Bronchitis    • Cancer (HCC)     skin   • Cancer (HCC) 2015    breast   • Dental disorder     upper implants front teeth   • Depression    • GERD (gastroesophageal reflux disease)    • Heart burn    • Heart murmur    • Hypertension    • Indigestion    • Joint replacement     bilateral knees and right hip   • Moderate aortic stenosis    • Nasal drainage    • Obesity    • Osteoporosis    • Psychiatric problem     anxiety/depression   • Restless leg syndrome    • Sleep apnea     CPAP   • Snoring    • Umbilical hernia 10/2016   • Unspecified urinary incontinence    • Urinary bladder disorder 6/21/2017    reports freq infections but none in the past year      Past Surgical History:   Procedure Laterality Date   • LATISSIMUS FLAP Left 6/27/2017    Procedure: LATISSIMUS FLAP W/INSERTION OF IMPLANT;  Surgeon: Johnny Flannery M.D.;  Location: SUR[facility]ERY [facility];  Service:    • BREAST IMPLANT REMOVAL Left 6/27/2017    Procedure: BREAST IMPLANT REMOVAL;  Surgeon: Johnny Flannery M.D.;  Location: [facility];  Service:    • CAPSULECTOMY  4/13/2017    and debridement left breast   • BOWEL RESECTION  12/28/2016   • BREAST RECONSTRUCTION Bilateral 11/29/2016    Procedure: BREAST RECONSTRUCTION;  Surgeon: Johnny CABALLERO" SONJA Flannery;  Location: [facility];  Service:    • LATISSIMUS FLAP Right 11/29/2016    Procedure: LATISSIMUS FLAP W/ IMPLANT;  Surgeon: Johnny Flannery M.D.;  Location: [facility];  Service:    • TISSUE EXPANDER PLACE/REMOVE Left 11/29/2016    Procedure: TISSUE EXPANDER REMOVE - REMOVAL AND INSERTION OF IMPLANT;  Surgeon: Johnny Flannery M.D.;  Location: [facility];  Service:    • CAPSULECTOMY Bilateral 11/29/2016    Procedure: CAPSULECTOMY;  Surgeon: Johnny Flannery M.D.;  Location: [facility];  Service:    • OTHER SURGICAL PROCEDURE Right 7/2016    debridement and breast expander placement   • BREAST IMPLANT REMOVAL Right 1/6/2016    Procedure: BREAST IMPLANT REMOVAL, placement of drain;  Surgeon: Jon Leblanc M.D.;  Location: [facility];  Service:    • OTHER SURGICAL PROCEDURE Right 1/2016     second debridement right breast   • CATH PLACEMENT Right 9/8/2015    Procedure: CATH PLACEMENT PORT;  Surgeon: Vargas Hyde M.D.;  Location: [facility];  Service:    • OTHER SURGICAL PROCEDURE  9/8/2015    Port placement for chemo   • MASTECTOMY MODIFIED RADICAL Left 8/10/2015    Procedure: MASTECTOMY MODIFIED RADICAL;  Surgeon: Vargas Hyde M.D.;  Location: [facility];  Service:    • MASTECTOMY Right 8/10/2015    Procedure: MASTECTOMY SIMPLE;  Surgeon: Vargas Hyde M.D.;  Location: [facility];  Service:    • NODE BIOPSY SENTINEL Left 8/10/2015    Procedure: NODE BIOPSY SENTINEL;  Surgeon: Vargas Hyde M.D.;  Location: [facility];  Service:    • BREAST RECONSTRUCTION Bilateral 8/10/2015    Procedure: BREAST RECONSTRUCTION VIA;  Surgeon: Johnny Flannery M.D.;  Location: [facility];  Service:    • TISSUE EXPANDER PLACE/REMOVE Bilateral 8/10/2015    Procedure: TISSUE EXPANDER PLACE/REMOVE & POSSIBLE ALLODERM;  Surgeon: Johnny Flannery M.D.;  Location: [facility]  "[facility];  Service:    • ROTATOR CUFF REPAIR Right 2009    right   • HIP ARTHROPLASTY TOTAL Right 2008    Hip Replacement, Total, right   • HAND SURGERY Right 2/12/07    joint Right Thumb   • ABDOMINOPLASTY  10/28/04   • GASTRIC BYPASS LAPAROSCOPIC  10/10/01   • KNEE ARTHROPLASTY TOTAL Bilateral 1/24/00    bilateral knees   • OTHER  9/99    tumor exc Right hip   • INGRID BY LAPAROSCOPY  9/97   • BLADDER SUSPENSION  12/93   • HYSTERECTOMY, TOTAL ABDOMINAL  5/93    rectal, bladder repair   • EAR MIDDLE EXPLORATION Left 4/90    Left   • BREAST BIOPSY Left 6/85   • TUBAL LIGATION  3/84   • ARTHROSCOPY, KNEE     • CHOLECYSTECTOMY     • COLON RESECTION     • HIP REPLACEMENT, TOTAL     • KNEE ARTHROSCOPY Left 1983, 12/97    Left   • KNEE ARTHROSCOPY Right 6/88, 12/91, 11/97    Right   • OTHER  5/97, 4/05/05    vaginal repair   • OTHER SURGICAL PROCEDURE  4/06, 10/06    bilateral \"arm lift\"   • SLEEVE,ARLEEN VASO THIGH     • TONSILLECTOMY       Family History   Problem Relation Age of Onset   • Kidney Disease Mother    • Heart Failure Father      Social History     Socioeconomic History   • Marital status:      Spouse name: Not on file   • Number of children: Not on file   • Years of education: Not on file   • Highest education level: Not on file   Occupational History   • Not on file   Social Needs   • Financial resource strain: Not on file   • Food insecurity     Worry: Not on file     Inability: Not on file   • Transportation needs     Medical: Not on file     Non-medical: Not on file   Tobacco Use   • Smoking status: Never Smoker   • Smokeless tobacco: Never Used   Substance and Sexual Activity   • Alcohol use: Yes     Comment: 2-3 per month   • Drug use: No   • Sexual activity: Not on file   Lifestyle   • Physical activity     Days per week: Not on file     Minutes per session: Not on file   • Stress: Not on file   Relationships   • Social connections     Talks on phone: Not on file     Gets together: Not on file "     Attends Christian service: Not on file     Active member of club or organization: Not on file     Attends meetings of clubs or organizations: Not on file     Relationship status: Not on file   • Intimate partner violence     Fear of current or ex partner: Not on file     Emotionally abused: Not on file     Physically abused: Not on file     Forced sexual activity: Not on file   Other Topics Concern   • Not on file   Social History Narrative   • Not on file     Allergies   Allergen Reactions   • Clindamycin Shortness of Breath and Rash     .   • Levaquin Shortness of Breath   • Penicillins Shortness of Breath and Rash     Tolerated Ancef 08/10/15   • Amlodipine      swelling   • Ampicillin      Tolerated Cefazolin on 08/10/15   • Demerol Itching     Itching and rash     • Lisinopril Cough     Outpatient Encounter Medications as of 6/2/2020   Medication Sig Dispense Refill   • fluticasone (FLONASE ALLERGY RELIEF) 50 MCG/ACT nasal spray Flonase Allergy Relief     • triamcinolone acetonide (KENALOG) 0.1 % Cream apply to affected area twice a day 5 DAYS ON AND 2 DAYS OFF CYCLES if needed     • Desvenlafaxine Succinate (PRISTIQ) 100 MG TABLET SR 24 HR Pristiq 100 mg tablet,extended release   Take 1 tablet every day by oral route.     • fluvoxAMINE (LUVOX) 50 MG Tab Luvox 50 mg tablet   Take 2 tablets every day by oral route.     • folic acid (FOLATE) 400 MCG tablet Take 400 mcg by mouth 2 Times a Day.     • Coenzyme Q10 (COQ10 PO) Take 300 mg by mouth every day.     • Cyanocobalamin (VITAMIN B-12) 1000 MCG Tab Take 2,000 mcg by mouth every day.     • Multiple Vitamins-Minerals (MULTIVITAMIN ADULT PO) Take  by mouth every day.     • aspirin 81 MG tablet Take 81 mg by mouth every day.     • ferrous gluconate (FERGON) 324 (38 FE) MG Tab Take 324 mg by mouth every morning with breakfast.     • acetaminophen (TYLENOL) 500 MG Tab Take 500-1,000 mg by mouth every 6 hours as needed.     • aripiprazole (ABILIFY) 5 MG tablet  Take 5 mg by mouth every morning. Indications: Major Depressive Disorder     • esomeprazole (NEXIUM) 40 MG delayed-release capsule Take 40 mg by mouth every morning. Indications: Gastroesophageal Reflux Disease with Current Symptoms     • alprazolam (XANAX) 0.5 MG TABS Take 0.5 mg by mouth as needed for Anxiety. Indications: Feeling Anxious     • Probiotic Product (PROBIOTIC DAILY PO) Take 1 Cap by mouth every day.     • CELEBREX 200 MG PO CAPS Take 200 mg by mouth. Takes 3x per week on Mon, Wed, Fri  Indications: Joint Damage causing Pain and Loss of Function     • solifenacin (VESICARE) 10 MG tablet Vesicare 10 mg tablet   Take 1 tablet every day by oral route.     • [DISCONTINUED] lisinopril (PRINIVIL) 10 MG Tab Take 10 mg by mouth.     • [DISCONTINUED] amLODIPine (NORVASC) 10 MG Tab Take 10 mg by mouth.     • [DISCONTINUED] phenazopyridine (PYRIDIUM) 200 MG Tab take 1 tablet by mouth three times a day after meals     • [DISCONTINUED] ALPRAZolam (XANAX) 0.5 MG Tab Xanax 0.5 mg tablet   Take 1 tablet as needed by oral route.     • [DISCONTINUED] aripiprazole (ABILIFY) 5 MG tablet Abilify 5 mg tablet   Take 1 tablet every day by oral route.     • [DISCONTINUED] celecoxib (CELEBREX) 200 MG Cap Celebrex 200 mg capsule   Take 1 capsule 3 times a week by oral route.     • [DISCONTINUED] esomeprazole (NEXIUM) 40 MG delayed-release capsule Nexium 40 mg capsule,delayed release   Take 1 capsule 3 times a week by oral route.     • [DISCONTINUED] solifenacin (VESICARE) 10 MG tablet      • FLECTOR 1.3 % Patch apply 1 patch to affected area twice a day  0   • [DISCONTINUED] VESICARE 5 MG tablet   1   • [DISCONTINUED] fluvoxAMINE (LUVOX) 50 MG Tab take 1 to 2 tablets by mouth once daily for OCD AND COMPULSIVE URGES  0   • [DISCONTINUED] Cholecalciferol (VITAMIN D3 PO) Take 4,000 Units by mouth.     • [DISCONTINUED] zolpidem (AMBIEN CR) 12.5 MG CR tablet Take 12.5 mg by mouth at bedtime as needed for Sleep. Indications: Trouble  "Sleeping     • [DISCONTINUED] Desvenlafaxine Succinate (PRISTIQ) 100 MG TB24 Take 1 Tab by mouth every morning. Indications: Major Depressive Disorder     • [DISCONTINUED] FLONASE 50 MCG/ACT NA SUSP Spray 1 Spray in nose as needed. Indications: Hayfever       No facility-administered encounter medications on file as of 6/2/2020.      Review of Systems   Constitutional: Negative for chills and fever.   HENT: Negative for sore throat.    Eyes: Negative for blurred vision.   Respiratory: Positive for shortness of breath. Negative for cough.    Cardiovascular: Negative for chest pain, palpitations, claudication, leg swelling and PND.   Gastrointestinal: Negative for abdominal pain and nausea.   Musculoskeletal: Negative for falls and joint pain.   Skin: Negative for rash.   Neurological: Negative for dizziness, focal weakness and weakness.   Endo/Heme/Allergies: Does not bruise/bleed easily.        Objective:   /62 (BP Location: Left arm, Patient Position: Sitting, BP Cuff Size: Adult)   Pulse 60   Ht 1.626 m (5' 4\")   Wt 69.4 kg (153 lb)   SpO2 95%   BMI 26.26 kg/m²     Physical Exam   Constitutional: No distress.   HENT:   Mouth/Throat: Oropharynx is clear and moist. No oropharyngeal exudate.   Eyes: No scleral icterus.   Neck: No JVD present.   Cardiovascular: Normal rate. Exam reveals no gallop and no friction rub.   Murmur heard.  Pulmonary/Chest: No respiratory distress. She has no wheezes. She has no rales.   Abdominal: Soft. Bowel sounds are normal.   Musculoskeletal:         General: No edema.   Neurological: She is alert.   Skin: No rash noted. She is not diaphoretic.   Psychiatric: She has a normal mood and affect.     We reviewed in person the most recent labs  Labs from [place] showed improving hyponatremia      Assessment:     1. Moderate aortic stenosis  EC-ECHOCARDIOGRAM COMPLETE W/O CONT   2. Bilateral carotid artery stenosis         Medical Decision Making:  Today's Assessment / Status / " Plan:     It was my pleasure to meet with Ms. Louis.    Based on her exam her aortic stenosis may have progressed as well as her symptoms so we will check an echocardiogram to [place] if this were to show severe aortic stenosis we will begin with the valve clinic if there remains moderate we will continue regular surveillance    Blood pressure is well controlled.  We specifically assessed the labs on hypertension treatment    She has been off of blood pressure agents with good blood pressures which is great news    I will see Ms. Louis back in 1 year time and encouraged her to follow up with us over the phone or electronically using my MyChart as issues arise.    It is my pleasure to participate in the care of Ms. Louis.  Please do not hesitate to contact me with questions or concerns.    Evelio Alfaro MD PhD [facility]  Cardiologist [facility]    Please note that this dictation was created using voice recognition software. There may be errors I did not discover before finalizing the note.

## 2020-06-04 ENCOUNTER — HOSPITAL ENCOUNTER (OUTPATIENT)
Dept: RADIOLOGY | Facility: MEDICAL CENTER | Age: 75
End: 2020-06-04
Attending: INTERNAL MEDICINE
Payer: MEDICARE

## 2020-06-04 DIAGNOSIS — R92.8 ABNORMAL MAMMOGRAM: ICD-10-CM

## 2020-06-04 DIAGNOSIS — C50.912 MALIGNANT NEOPLASM OF LEFT FEMALE BREAST, UNSPECIFIED ESTROGEN RECEPTOR STATUS, UNSPECIFIED SITE OF BREAST (HCC): ICD-10-CM

## 2020-06-04 PROCEDURE — 71250 CT THORAX DX C-: CPT

## 2020-06-05 LAB
BACTERIA UR CULT: ABNORMAL
BACTERIA UR CULT: ABNORMAL
SIGNIFICANT IND 70042: ABNORMAL
SITE SITE: ABNORMAL
SOURCE SOURCE: ABNORMAL

## 2020-06-08 ENCOUNTER — SLEEP CENTER VISIT (OUTPATIENT)
Dept: SLEEP MEDICINE | Facility: MEDICAL CENTER | Age: 75
End: 2020-06-08
Payer: MEDICARE

## 2020-06-08 VITALS
BODY MASS INDEX: 25.27 KG/M2 | HEIGHT: 64 IN | OXYGEN SATURATION: 97 % | RESPIRATION RATE: 14 BRPM | HEART RATE: 61 BPM | SYSTOLIC BLOOD PRESSURE: 122 MMHG | WEIGHT: 148 LBS | DIASTOLIC BLOOD PRESSURE: 80 MMHG

## 2020-06-08 DIAGNOSIS — G47.33 OSA (OBSTRUCTIVE SLEEP APNEA): ICD-10-CM

## 2020-06-08 PROCEDURE — 99213 OFFICE O/P EST LOW 20 MIN: CPT | Performed by: FAMILY MEDICINE

## 2020-06-08 RX ORDER — SODIUM CHLORIDE 9 MG/ML
INJECTION, SOLUTION INTRAVENOUS CONTINUOUS
Status: CANCELLED | OUTPATIENT
Start: 2020-06-16

## 2020-06-08 NOTE — PROGRESS NOTES
[facility] Follow Up Note     Date: 6/8/2020 / Time: 12:54 PM    Patient ID:   Name:             Tiffany Louis   YOB: 1945  Age:                 75 y.o.  female   MRN:               [id number]      Thank you for requesting a sleep medicine consultation on Tiffany Louis at the sleep center. She presents today with the chief complaints of [place] yearly follow up.     HISTORY OF PRESENT ILLNESS:       Pt is currently on CPAP 7 cm. She goes to sleep around 9 pm and wakes up around 7 am. She is getting about 6 hrs of sleep on a good night. The bad nights are about rare per month. She has stopped using ambien Cr and currently using OTC sleep aid. However denies any SE.Overall,  She does finds her sleep refreshing. She does not take regular naps. Denies any other sleep disorder like RLS or parasomnia.    She is using CPAP most days of the week. Pt reports 9 hrs of average nightly use of CPAP. Pt denies snoring, gasping,choking.Pt also denies significant mask leak that is interfering with sleep. The 30 day compliance was downloaded which shows adequate compliance with more that 4 hr usage about 97%. The AHI is has improved to 0.5/hr. The [place] is well controlled      SLEEP HISTORY   She is a long-standing history of sleep apnea initially diagnosed in 2009.  PSG indicates severe sleep apnea with an AHI of 32.9, minimum oxygen saturation of 84 %.      REVIEW OF SYSTEMS:       Constitutional: Denies fevers, Denies weight changes  Eyes: Denies changes in vision, no eye pain  Ears/Nose/Throat/Mouth: Denies nasal congestion or sore throat   Cardiovascular: Denies chest pain or palpitations   Respiratory: Denies shortness of breath , Denies cough  Gastrointestinal/Hepatic: Denies abdominal pain, nausea, vomiting, diarrhea  Neurological: Denies headache, confusion, memory loss or focal weakness/parasthesias  Psychiatric: denies mood disorder   Sleep: denies snoring     Comprehensive review of  systems form is reviewed with the patient and is attached in the EMR.     PMH:  has a past medical history of Anemia, Anesthesia, Arthritis, Bilateral carotid artery stenosis, Breast cancer ([facility]) (), Bronchitis, Cancer ([facility]), Cancer (HCC) (2015), Dental disorder, Depression, GERD (gastroesophageal reflux disease), Heart burn, Heart murmur, Hypertension, Indigestion, Joint replacement, Moderate aortic stenosis, Nasal drainage, Obesity, Osteoporosis, Psychiatric problem, Restless leg syndrome, Sleep apnea, Snoring, Umbilical hernia (10/2016), Unspecified urinary incontinence, and Urinary bladder disorder (6/21/2017).  MEDS:   Current Outpatient Medications:   •  fluticasone (FLONASE ALLERGY RELIEF) 50 MCG/ACT nasal spray, Flonase Allergy Relief, Disp: , Rfl:   •  Desvenlafaxine Succinate (PRISTIQ) 100 MG TABLET SR 24 HR, Pristiq 100 mg tablet,extended release  Take 1 tablet every day by oral route., Disp: , Rfl:   •  fluvoxAMINE (LUVOX) 50 MG Tab, Luvox 50 mg tablet  Take 2 tablets every day by oral route., Disp: , Rfl:   •  folic acid (FOLATE) 400 MCG tablet, Take 400 mcg by mouth 2 Times a Day., Disp: , Rfl:   •  Coenzyme Q10 (COQ10 PO), Take 300 mg by mouth every day., Disp: , Rfl:   •  Cyanocobalamin (VITAMIN B-12) 1000 MCG Tab, Take 2,000 mcg by mouth every day., Disp: , Rfl:   •  Multiple Vitamins-Minerals (MULTIVITAMIN ADULT PO), Take  by mouth every day., Disp: , Rfl:   •  aspirin 81 MG tablet, Take 81 mg by mouth every day., Disp: , Rfl:   •  ferrous gluconate (FERGON) 324 (38 FE) MG Tab, Take 324 mg by mouth every morning with breakfast., Disp: , Rfl:   •  aripiprazole (ABILIFY) 5 MG tablet, Take 5 mg by mouth every morning. Indications: Major Depressive Disorder, Disp: , Rfl:   •  esomeprazole (NEXIUM) 40 MG delayed-release capsule, Take 40 mg by mouth every morning. Indications: Gastroesophageal Reflux Disease with Current Symptoms, Disp: , Rfl:   •  alprazolam (XANAX) 0.5 MG TABS, Take 0.5 mg by  mouth as needed for Anxiety. Indications: Feeling Anxious, Disp: , Rfl:   •  Probiotic Product (PROBIOTIC DAILY PO), Take 1 Cap by mouth every day., Disp: , Rfl:   •  CELEBREX 200 MG PO CAPS, Take 200 mg by mouth. Takes 3x per week on Mon, Wed, Fri  Indications: Joint Damage causing Pain and Loss of Function, Disp: , Rfl:   •  triamcinolone acetonide (KENALOG) 0.1 % Cream, apply to affected area twice a day 5 DAYS ON AND 2 DAYS OFF CYCLES if needed, Disp: , Rfl:   •  solifenacin (VESICARE) 10 MG tablet, Vesicare 10 mg tablet  Take 1 tablet every day by oral route., Disp: , Rfl:   •  FLECTOR 1.3 % Patch, apply 1 patch to affected area twice a day, Disp: , Rfl: 0  •  acetaminophen (TYLENOL) 500 MG Tab, Take 500-1,000 mg by mouth every 6 hours as needed., Disp: , Rfl:   ALLERGIES:   Allergies   Allergen Reactions   • Clindamycin Shortness of Breath and Rash     .   • Levaquin Shortness of Breath   • Penicillins Shortness of Breath and Rash     Tolerated Ancef 08/10/15   • Amlodipine      swelling   • Ampicillin      Tolerated Cefazolin on 08/10/15   • Demerol Itching     Itching and rash     • Lisinopril Cough     SURGHX:   Past Surgical History:   Procedure Laterality Date   • LATISSIMUS FLAP Left 6/27/2017    Procedure: LATISSIMUS FLAP W/INSERTION OF IMPLANT;  Surgeon: Johnny Flannery M.D.;  Location: [facility];  Service:    • BREAST IMPLANT REMOVAL Left 6/27/2017    Procedure: BREAST IMPLANT REMOVAL;  Surgeon: Johnny Flannery M.D.;  Location: [facility];  Service:    • CAPSULECTOMY  4/13/2017    and debridement left breast   • BOWEL RESECTION  12/28/2016   • BREAST RECONSTRUCTION Bilateral 11/29/2016    Procedure: BREAST RECONSTRUCTION;  Surgeon: Johnny Flannery M.D.;  Location: [facility];  Service:    • LATISSIMUS FLAP Right 11/29/2016    Procedure: LATISSIMUS FLAP W/ IMPLANT;  Surgeon: Johnny Flannery M.D.;  Location: [facility];  Service:    •  TISSUE EXPANDER PLACE/REMOVE Left 11/29/2016    Procedure: TISSUE EXPANDER REMOVE - REMOVAL AND INSERTION OF IMPLANT;  Surgeon: Johnny Flannery M.D.;  Location: [facility];  Service:    • CAPSULECTOMY Bilateral 11/29/2016    Procedure: CAPSULECTOMY;  Surgeon: Johnny Flannery M.D.;  Location: [facility];  Service:    • OTHER SURGICAL PROCEDURE Right 7/2016    debridement and breast expander placement   • BREAST IMPLANT REMOVAL Right 1/6/2016    Procedure: BREAST IMPLANT REMOVAL, placement of drain;  Surgeon: Jon Leblanc M.D.;  Location: [facility];  Service:    • OTHER SURGICAL PROCEDURE Right 1/2016     second debridement right breast   • CATH PLACEMENT Right 9/8/2015    Procedure: CATH PLACEMENT PORT;  Surgeon: Vargas Hyde M.D.;  Location: [facility];  Service:    • OTHER SURGICAL PROCEDURE  9/8/2015    Port placement for chemo   • MASTECTOMY MODIFIED RADICAL Left 8/10/2015    Procedure: MASTECTOMY MODIFIED RADICAL;  Surgeon: Vargas Hyde M.D.;  Location: [facility];  Service:    • MASTECTOMY Right 8/10/2015    Procedure: MASTECTOMY SIMPLE;  Surgeon: Vargas Hyde M.D.;  Location: [facility];  Service:    • NODE BIOPSY SENTINEL Left 8/10/2015    Procedure: NODE BIOPSY SENTINEL;  Surgeon: Vargas Hyde M.D.;  Location: [facility];  Service:    • BREAST RECONSTRUCTION Bilateral 8/10/2015    Procedure: BREAST RECONSTRUCTION VIA;  Surgeon: Johnny Flannery M.D.;  Location: [facility];  Service:    • TISSUE EXPANDER PLACE/REMOVE Bilateral 8/10/2015    Procedure: TISSUE EXPANDER PLACE/REMOVE & POSSIBLE ALLODERM;  Surgeon: Johnny Flannery M.D.;  Location: [facility];  Service:    • ROTATOR CUFF REPAIR Right 2009    right   • HIP ARTHROPLASTY TOTAL Right 2008    Hip Replacement, Total, right   • HAND SURGERY Right 2/12/07    joint Right Thumb   • ABDOMINOPLASTY  10/28/04   • GASTRIC  "BYPASS LAPAROSCOPIC  10/10/01   • KNEE ARTHROPLASTY TOTAL Bilateral 1/24/00    bilateral knees   • OTHER  9/99    tumor exc Right hip   • INGRID BY LAPAROSCOPY  9/97   • BLADDER SUSPENSION  12/93   • HYSTERECTOMY, TOTAL ABDOMINAL  5/93    rectal, bladder repair   • EAR MIDDLE EXPLORATION Left 4/90    Left   • BREAST BIOPSY Left 6/85   • TUBAL LIGATION  3/84   • ARTHROSCOPY, KNEE     • CHOLECYSTECTOMY     • COLON RESECTION     • HIP REPLACEMENT, TOTAL     • KNEE ARTHROSCOPY Left 1983, 12/97    Left   • KNEE ARTHROSCOPY Right 6/88, 12/91, 11/97    Right   • OTHER  5/97, 4/05/05    vaginal repair   • OTHER SURGICAL PROCEDURE  4/06, 10/06    bilateral \"arm lift\"   • SLEEVE,ARLEEN VASO THIGH     • TONSILLECTOMY       SOCHX:  reports that she has never smoked. She has never used smokeless tobacco. She reports current alcohol use. She reports that she does not use drugs..  FH:   Family History   Problem Relation Age of Onset   • Kidney Disease Mother    • Heart Failure Father          Physical Exam:  Vitals/ General Appearance:   Weight/BMI: Body mass index is 25.4 kg/m².  /80 (BP Location: Right arm, Patient Position: Sitting, BP Cuff Size: Adult)   Pulse 61   Resp 14   Ht 1.626 m (5' 4\")   Wt 67.1 kg (148 lb)   SpO2 97%   Vitals:    06/08/20 1245   BP: 122/80   BP Location: Right arm   Patient Position: Sitting   BP Cuff Size: Adult   Pulse: 61   Resp: 14   SpO2: 97%   Weight: 67.1 kg (148 lb)   Height: 1.626 m (5' 4\")       Pt. is alert and oriented to time, place and person. Cooperative and in no apparent distress.       Constitutional: Alert, no distress, well-groomed.  Skin: No rashes in visible areas.  Eye: Round. Conjunctiva clear, lids normal. No icterus.   Neck: No masses, no thyromegaly. Moves freely without pain.  -. Lungs auscultation: B/L good air entry, vesicular breath sounds, no adventitious sounds  -. Heart auscultation: 1st and 2nd heart sounds normal, regular rhythm. No appreciable murmur.  - " Extremities: no clubbing, no pedal edema.  - NEUROLOGICAL EXAMINATION: On neurological exam, the patient was alert and oriented x3.    ASSESSMENT AND PLAN     1. Sleep Apnea     She is urged to avoid supine sleep, weight gain and alcoholic beverages since all of these can worsen sleep apnea. She is cautioned against drowsy driving. If She feels sleepy while driving, She must pull over for a break/nap, rather than persist on the road, in the interest of She own safety and that of others on the road.   Plan   - Continue CPAP a7 cm with nasal pillow mask    - supplies are refilled    - compliance download was reviewed and discussed with the pt   - compliance was reinforced     2. Regarding treatment of other past medical problems and general health maintenance,  She is urged to follow up with PCP.

## 2020-06-09 ENCOUNTER — HOSPITAL ENCOUNTER (OUTPATIENT)
Dept: CARDIOLOGY | Facility: MEDICAL CENTER | Age: 75
End: 2020-06-09
Attending: INTERNAL MEDICINE
Payer: MEDICARE

## 2020-06-09 DIAGNOSIS — I35.0 MODERATE AORTIC STENOSIS: Chronic | ICD-10-CM

## 2020-06-09 LAB
LV EJECT FRACT MOD 2C 99903: 71.33
LV EJECT FRACT MOD 4C 99902: 64.81
LV EJECT FRACT MOD BP 99901: 67.78

## 2020-06-09 PROCEDURE — 93306 TTE W/DOPPLER COMPLETE: CPT

## 2020-06-09 PROCEDURE — 93306 TTE W/DOPPLER COMPLETE: CPT | Mod: 26 | Performed by: INTERNAL MEDICINE

## 2020-06-10 ENCOUNTER — TELEPHONE (OUTPATIENT)
Dept: CARDIOLOGY | Facility: MEDICAL CENTER | Age: 75
End: 2020-06-10

## 2020-06-10 DIAGNOSIS — I35.0 SEVERE AORTIC STENOSIS: ICD-10-CM

## 2020-06-10 NOTE — TELEPHONE ENCOUNTER
Result Notes for EC-ECHOCARDIOGRAM COMPLETE W/O CONT   Notes recorded by Evelio Alfaro M.D. on 6/9/2020 at 5:48 PM PDT     Her echocardiogram did show progression of the tightening of her aortic valve now severe so certainly worthwhile to establish with the valve clinic to talk about the stent valve.  First she should take care of the biopsy with Dr. Fuentes office there is no particular urgency but this helps explain why her breath is become more short recently.     Thank you      Called pt and reviewed MD recommendations.  She verbalizes understanding and states she reviewed her results via [employer].  Pt states she would like to proceed with plan of care.  She states no other concerns or questions at this time and is appreciative of information given.    Referral to [facility] placed and staff message sent to [facility] regarding referral.

## 2020-06-11 ENCOUNTER — OFFICE VISIT (OUTPATIENT)
Dept: ADMISSIONS | Facility: MEDICAL CENTER | Age: 75
End: 2020-06-11
Attending: INTERNAL MEDICINE
Payer: MEDICARE

## 2020-06-11 DIAGNOSIS — Z01.812 PRE-OPERATIVE LABORATORY EXAMINATION: ICD-10-CM

## 2020-06-11 LAB — COVID ORDER STATUS COVID19: NORMAL

## 2020-06-11 PROCEDURE — C9803 HOPD COVID-19 SPEC COLLECT: HCPCS

## 2020-06-11 RX ORDER — POLYETHYLENE GLYCOL 3350 17 G/17G
17 POWDER, FOR SOLUTION ORAL DAILY
Status: ON HOLD | COMMUNITY
End: 2022-01-26

## 2020-06-11 RX ORDER — FERROUS SULFATE 325(65) MG
325 TABLET ORAL DAILY
COMMUNITY

## 2020-06-11 RX ORDER — CEFUROXIME AXETIL 500 MG/1
500 TABLET ORAL 2 TIMES DAILY
COMMUNITY
End: 2020-07-08

## 2020-06-15 LAB
SARS-COV-2 RNA RESP QL NAA+PROBE: NOT DETECTED
SPECIMEN SOURCE: NORMAL

## 2020-06-15 NOTE — OR NURSING
COVID-19 Pre-surgery screening:  ?     Do you have an undiagnosed respiratory illness or symptoms such as coughing or sneezing? no (Yes/No)        Onset of Sx n/a          Acute vs. chronic respiratory illness n/a        Do you have an unexplained fever greater than 100.4 degrees Fahrenheit or 38 degrees Celsius?     no (Yes/No)  ?     Have you had direct exposure to a patient who tested positive for Covid-19?     no (Yes/No)  ?     Have you traveled outside of [place] within the last 14 days?     no (Yes/No)  ?  Informed of visitor policy- yes

## 2020-06-16 ENCOUNTER — APPOINTMENT (OUTPATIENT)
Dept: RADIOLOGY | Facility: MEDICAL CENTER | Age: 75
End: 2020-06-16
Attending: INTERNAL MEDICINE
Payer: MEDICARE

## 2020-06-16 ENCOUNTER — APPOINTMENT (OUTPATIENT)
Dept: RADIOLOGY | Facility: MEDICAL CENTER | Age: 75
End: 2020-06-16
Attending: RADIOLOGY
Payer: MEDICARE

## 2020-06-16 ENCOUNTER — HOSPITAL ENCOUNTER (OUTPATIENT)
Facility: MEDICAL CENTER | Age: 75
End: 2020-06-16
Attending: INTERNAL MEDICINE | Admitting: INTERNAL MEDICINE
Payer: MEDICARE

## 2020-06-16 VITALS
BODY MASS INDEX: 25.82 KG/M2 | WEIGHT: 151.24 LBS | TEMPERATURE: 97.5 F | OXYGEN SATURATION: 94 % | SYSTOLIC BLOOD PRESSURE: 117 MMHG | HEIGHT: 64 IN | DIASTOLIC BLOOD PRESSURE: 48 MMHG | HEART RATE: 85 BPM | RESPIRATION RATE: 14 BRPM

## 2020-06-16 DIAGNOSIS — C50.912 MALIGNANT NEOPLASM OF LEFT FEMALE BREAST, UNSPECIFIED ESTROGEN RECEPTOR STATUS, UNSPECIFIED SITE OF BREAST (HCC): ICD-10-CM

## 2020-06-16 LAB
ERYTHROCYTE [DISTWIDTH] IN BLOOD BY AUTOMATED COUNT: 45.1 FL (ref 35.9–50)
HCT VFR BLD AUTO: 38.2 % (ref 37–47)
HGB BLD-MCNC: 12.8 G/DL (ref 12–16)
INR PPP: 0.98 (ref 0.87–1.13)
MCH RBC QN AUTO: 30.2 PG (ref 27–33)
MCHC RBC AUTO-ENTMCNC: 33.5 G/DL (ref 33.6–35)
MCV RBC AUTO: 90.1 FL (ref 81.4–97.8)
PATHOLOGY CONSULT NOTE: NORMAL
PLATELET # BLD AUTO: 258 K/UL (ref 164–446)
PMV BLD AUTO: 8.7 FL (ref 9–12.9)
PROTHROMBIN TIME: 13.2 SEC (ref 12–14.6)
RBC # BLD AUTO: 4.24 M/UL (ref 4.2–5.4)
WBC # BLD AUTO: 4.1 K/UL (ref 4.8–10.8)

## 2020-06-16 PROCEDURE — 88305 TISSUE EXAM BY PATHOLOGIST: CPT

## 2020-06-16 PROCEDURE — 88342 IMHCHEM/IMCYTCHM 1ST ANTB: CPT

## 2020-06-16 PROCEDURE — 99153 MOD SED SAME PHYS/QHP EA: CPT

## 2020-06-16 PROCEDURE — 160002 HCHG RECOVERY MINUTES (STAT)

## 2020-06-16 PROCEDURE — 85610 PROTHROMBIN TIME: CPT

## 2020-06-16 PROCEDURE — 700111 HCHG RX REV CODE 636 W/ 250 OVERRIDE (IP): Performed by: RADIOLOGY

## 2020-06-16 PROCEDURE — 71045 X-RAY EXAM CHEST 1 VIEW: CPT

## 2020-06-16 PROCEDURE — 85027 COMPLETE CBC AUTOMATED: CPT

## 2020-06-16 PROCEDURE — 700111 HCHG RX REV CODE 636 W/ 250 OVERRIDE (IP)

## 2020-06-16 PROCEDURE — 88341 IMHCHEM/IMCYTCHM EA ADD ANTB: CPT | Mod: 91

## 2020-06-16 RX ORDER — ONDANSETRON 2 MG/ML
4 INJECTION INTRAMUSCULAR; INTRAVENOUS EVERY 8 HOURS PRN
Status: DISCONTINUED | OUTPATIENT
Start: 2020-06-16 | End: 2020-06-16 | Stop reason: HOSPADM

## 2020-06-16 RX ORDER — MIDAZOLAM HYDROCHLORIDE 1 MG/ML
.5-2 INJECTION INTRAMUSCULAR; INTRAVENOUS PRN
Status: ACTIVE | OUTPATIENT
Start: 2020-06-16 | End: 2020-06-16

## 2020-06-16 RX ORDER — MIDAZOLAM HYDROCHLORIDE 1 MG/ML
INJECTION INTRAMUSCULAR; INTRAVENOUS
Status: COMPLETED
Start: 2020-06-16 | End: 2020-06-16

## 2020-06-16 RX ORDER — SODIUM CHLORIDE 9 MG/ML
INJECTION, SOLUTION INTRAVENOUS CONTINUOUS
Status: DISCONTINUED | OUTPATIENT
Start: 2020-06-16 | End: 2020-06-16 | Stop reason: HOSPADM

## 2020-06-16 RX ORDER — ONDANSETRON 2 MG/ML
4 INJECTION INTRAMUSCULAR; INTRAVENOUS PRN
Status: ACTIVE | OUTPATIENT
Start: 2020-06-16 | End: 2020-06-16

## 2020-06-16 RX ORDER — SODIUM CHLORIDE 9 MG/ML
500 INJECTION, SOLUTION INTRAVENOUS
Status: ACTIVE | OUTPATIENT
Start: 2020-06-16 | End: 2020-06-16

## 2020-06-16 RX ORDER — HYDROMORPHONE HYDROCHLORIDE 2 MG/ML
0.25 INJECTION, SOLUTION INTRAMUSCULAR; INTRAVENOUS; SUBCUTANEOUS
Status: DISCONTINUED | OUTPATIENT
Start: 2020-06-16 | End: 2020-06-16 | Stop reason: HOSPADM

## 2020-06-16 RX ORDER — OXYCODONE HYDROCHLORIDE 5 MG/1
2.5 TABLET ORAL
Status: DISCONTINUED | OUTPATIENT
Start: 2020-06-16 | End: 2020-06-16 | Stop reason: HOSPADM

## 2020-06-16 RX ORDER — ONDANSETRON 2 MG/ML
INJECTION INTRAMUSCULAR; INTRAVENOUS
Status: COMPLETED
Start: 2020-06-16 | End: 2020-06-16

## 2020-06-16 RX ADMIN — MIDAZOLAM HYDROCHLORIDE 1 MG: 1 INJECTION, SOLUTION INTRAMUSCULAR; INTRAVENOUS at 12:00

## 2020-06-16 RX ADMIN — FENTANYL CITRATE 50 MCG: 50 INJECTION INTRAMUSCULAR; INTRAVENOUS at 11:58

## 2020-06-16 RX ADMIN — MIDAZOLAM HYDROCHLORIDE 1 MG: 1 INJECTION, SOLUTION INTRAMUSCULAR; INTRAVENOUS at 12:12

## 2020-06-16 RX ADMIN — FENTANYL CITRATE 50 MCG: 50 INJECTION INTRAMUSCULAR; INTRAVENOUS at 12:02

## 2020-06-16 RX ADMIN — ONDANSETRON 4 MG: 2 INJECTION INTRAMUSCULAR; INTRAVENOUS at 11:52

## 2020-06-16 NOTE — DISCHARGE INSTRUCTIONS
ACTIVITY: Rest and take it easy for the first 24 hours.  A responsible adult is recommended to remain with you during that time.  It is normal to feel sleepy.  We encourage you to not do anything that requires balance, judgment or coordination.    MILD FLU-LIKE SYMPTOMS ARE NORMAL. YOU MAY EXPERIENCE GENERALIZED MUSCLE ACHES, THROAT IRRITATION, HEADACHE AND/OR SOME NAUSEA.    FOR 24 HOURS DO NOT:  Drive, operate machinery or run household appliances.  Drink beer or alcoholic beverages.   Make important decisions or sign legal documents.    SPECIAL INSTRUCTIONS: Follow up with your referring Physician.  Resume home medications.  Lung Biopsy  A lung biopsy is a procedure in which a tissue sample is removed from the lung. The tissue can be examined under a microscope to help diagnose various lung disorders.   RISKS AND COMPLICATIONS  Generally, a lung biopsy is a safe procedure. However, problems can occur and include:  · Collapse of the lung.    · Bleeding.    · Infection.    PROCEDURE  · Needle biopsy. A biopsy needle is inserted into the lung. The needle is used to collect the tissue sample. A CT scanner may be used to guide the needle to the right place in the lung. For this method, a medicine is used to numb the area where the biopsy sample will be taken (local anesthetic).  AFTER THE PROCEDURE  · Your recovery will be assessed and monitored.  · You might have soreness and tenderness at the site of the biopsy for a few days after the procedure.  This information is not intended to replace advice given to you by your health care provider. Make sure you discuss any questions you have with your health care provider.    DIET: To avoid nausea, slowly advance diet as tolerated, avoiding spicy or greasy foods for the first day.  Add more substantial food to your diet according to your physician's instructions.  Babies can be fed formula or breast milk as soon as they are hungry.  INCREASE FLUIDS AND FIBER TO AVOID  CONSTIPATION.    SURGICAL DRESSING/BATHING: Keep dressing clean and dry for 24 hours. May remove dressing and shower after 1:00 PM on 6/17, do not need to replace dressing. Do not submerge in water or bathe for 5 days.    FOLLOW-UP APPOINTMENT:  A follow-up appointment should be arranged with your doctor; call to schedule.    You should CALL YOUR PHYSICIAN if you develop:  Fever greater than 101 degrees F.  Pain not relieved by medication, or persistent nausea or vomiting.  Excessive bleeding (blood soaking through dressing) or unexpected drainage from the wound.  Extreme redness or swelling around the incision site, drainage of pus or foul smelling drainage.  Inability to urinate or empty your bladder within 8 hours.  Problems with breathing or chest pain.    You should call 911 if you develop problems with breathing or chest pain.  If you are unable to contact your doctor or surgical center, you should go to the nearest emergency room or urgent care center.  Physician's telephone #: [phone]    If any questions arise, call your doctor.  If your doctor is not available, please feel free to call the Surgical Center at [phone].  The Center is open Monday through Friday from 7AM to 7PM.  You can also call the [facility] HOTLINE open 24 hours/day, 7 days/week and speak to a nurse at [phone], or toll free at [phone].    A registered nurse may call you a few days after your surgery to see how you are doing after your procedure.    MEDICATIONS: Resume taking daily medication.  Take prescribed pain medication with food.  If no medication is prescribed, you may take non-aspirin pain medication if needed.  PAIN MEDICATION CAN BE VERY CONSTIPATING.  Take a stool softener or laxative such as senokot, pericolace, or milk of magnesia if needed.    If your physician has prescribed pain medication that includes Acetaminophen (Tylenol), do not take additional Acetaminophen (Tylenol) while taking the prescribed  medication.    Depression / Suicide Risk    As you are discharged from this [facility] facility, it is important to learn how to keep safe from harming yourself.    Recognize the warning signs:  · Abrupt changes in personality, positive or negative- including increase in energy   · Giving away possessions  · Change in eating patterns- significant weight changes-  positive or negative  · Change in sleeping patterns- unable to sleep or sleeping all the time   · Unwillingness or inability to communicate  · Depression  · Unusual sadness, discouragement and loneliness  · Talk of wanting to die  · Neglect of personal appearance   · Rebelliousness- reckless behavior  · Withdrawal from people/activities they love  · Confusion- inability to concentrate     If you or a loved one observes any of these behaviors or has concerns about self-harm, here's what you can do:  · Talk about it- your feelings and reasons for harming yourself  · Remove any means that you might use to hurt yourself (examples: pills, rope, extension cords, firearm)  · Get professional help from the community (Mental Health, Substance Abuse, psychological counseling)  · Do not be alone:Call your Safe Contact- someone whom you trust who will be there for you.  · Call your local CRISIS HOTLINE 607-7724 or 371-030-6763  · Call your local Children's Mobile Crisis Response Team [place] (459) 514-1209 or www.[employer]  · Call the toll free National Suicide Prevention Hotlines   · National Suicide Prevention Lifeline 096-953-QNEH (3190)  · National Hope Line Network 800-SUICIDE (242-0051)

## 2020-06-16 NOTE — PROGRESS NOTES
CT guided biopsy of right lung nodule performed by Dr Ferrara via anterior access. Procedure BARs explained to patient by physician and consent obtained. Patient to CT4 and assisted to table. Patient was monitored and assessed continuously throughout procedure; ETCO2 32-36 with consistent waveform.Biopsy completed without complication. Right anterior chest puncture site CDI; sterile guaze/tegaderm dressing applied. Patient tolerated procedure quite well, but appropriately responsive afterward. Patient transferred to [facility] PACU in good condition.   Cores x 6 submitted to pathology in formalin.

## 2020-06-16 NOTE — OR NURSING
1251: Patient arrived from IR s/p right lung biopsy with RN. Right anterior chest biopsy site; clean, dry, and intact. Patient is alert and awake. Updated on POC.    1424: First post-op chest X-ray results show no pneumothorax per radiologist. Patient tolerating PO intake.    1614: Second post-op chest X-ray results show no pneumothorax per radiologist. Patient cleared for discharge per Dr. Ferrara. Criteria met to discharge patient.    1645: Discharge paperwork reviewed with patient and spouse. Discussed activity, site care, worsening symptoms, and follow-up. Verbalized understanding. No further questions. PIV removed, tip intact.    1650: Patient escorted out in wheelchair with CNA. Discharge instructions and personal belongings in possession of the patient. Copy of discharge instructions signed and placed in the chart. Patient discharged to home with spouse.

## 2020-06-16 NOTE — OR SURGEON
Immediate Post- Operative Note    PostOp Diagnosis: RUL LUNG NODULE. HX BREAST CA. BILATERAL MASTECTOMY, MAMMOPLASTY      Procedure(s): CT GUIDED RIGHT LUNG BIOPSY    RUL POSTERIOR-LATERAL PERIPHERAL LUNG NODULE    20G CORES X 6 IN FORMALIN.     BIO-SENTRY DID NOT DEPLOY.      Estimated Blood Loss: <5CC        Complications: MILD-MOD PATCHY PARENCHYMAL HEMORRHAGE ALONG NEEDLE PATH. NO HEMOPTYSIS.           6/16/2020     12:35 PM     Gerber Ferrara M.D.

## 2020-06-18 ENCOUNTER — TELEPHONE (OUTPATIENT)
Dept: CARDIOLOGY | Facility: MEDICAL CENTER | Age: 75
End: 2020-06-18

## 2020-06-18 NOTE — PROGRESS NOTES
"  REFERRING PHYSICIAN: Evelio Alfaro MD.     CONSULTING PHYSICIAN: David Bernardo DO.    CHIEF COMPLAINT: Shortness of breath    HISTORY OF PRESENT ILLNESS: The patient is a 75 y.o. female with history significant for anemia, osteoarthritis, bilateral carotid artery stenosis, breast cancer, depression, GERD, and severe aortic stenosis.  She is currently undergoing work up for a suspicious lung nodule, biopsy completed 6/16/2020.  She has been followed by Dr. Alfaro for moderate AS.  Recently her echocardiogram showed that is has worsened to severe AS.  She has had an increase in shortness of breath with activity for the past 4 months.  Also reports increasing dizziness    PAST MEDICAL HISTORY:   Past Medical History:   Diagnosis Date   • Anemia    • Anesthesia     \"very low blood pressure\"   • Arthritis     osteo, \"all over\"   • Bilateral carotid artery stenosis    • Breast cancer (HCC)     left   • Breath shortness     from aortic stenosis   • Bronchitis    • Cancer (HCC)     skin   • Cancer (HCC) 2015    breast   • Cataract 06/11/2020    no surgery   • Dental disorder     upper implants front teeth, crowns   • Depression    • GERD (gastroesophageal reflux disease)    • Heart burn    • Heart murmur    • Indigestion    • Joint replacement     bilateral knees and right hip   • Moderate aortic stenosis     severe per pt   • Nasal drainage    • Obesity    • Osteoporosis    • Psychiatric problem     anxiety/depression   • Renal disorder 06/11/2020    kidney stone   • Restless leg syndrome    • Sleep apnea     CPAP   • Snoring    • Umbilical hernia 10/2016   • Unspecified urinary incontinence    • Urinary bladder disorder 6/21/2017    reports freq infections but none in the past year; on ABX, + bacteria in \"gut\"       PAST SURGICAL HISTORY:   Past Surgical History:   Procedure Laterality Date   • LATISSIMUS FLAP Left 6/27/2017    Procedure: LATISSIMUS FLAP W/INSERTION OF IMPLANT;  Surgeon: Johnny CABALLERO" SONJA Flannery;  Location: [facility];  Service:    • BREAST IMPLANT REMOVAL Left 6/27/2017    Procedure: BREAST IMPLANT REMOVAL;  Surgeon: Johnny Flannery M.D.;  Location: [facility];  Service:    • CAPSULECTOMY  4/13/2017    and debridement left breast   • BOWEL RESECTION  12/28/2016   • BREAST RECONSTRUCTION Bilateral 11/29/2016    Procedure: BREAST RECONSTRUCTION;  Surgeon: Johnny Flannery M.D.;  Location: [facility];  Service:    • LATISSIMUS FLAP Right 11/29/2016    Procedure: LATISSIMUS FLAP W/ IMPLANT;  Surgeon: Johnny Flannery M.D.;  Location: [facility];  Service:    • TISSUE EXPANDER PLACE/REMOVE Left 11/29/2016    Procedure: TISSUE EXPANDER REMOVE - REMOVAL AND INSERTION OF IMPLANT;  Surgeon: Johnny Flannery M.D.;  Location: [facility];  Service:    • CAPSULECTOMY Bilateral 11/29/2016    Procedure: CAPSULECTOMY;  Surgeon: Johnny Flannery M.D.;  Location: [facility];  Service:    • OTHER SURGICAL PROCEDURE Right 7/2016    debridement and breast expander placement   • BREAST IMPLANT REMOVAL Right 1/6/2016    Procedure: BREAST IMPLANT REMOVAL, placement of drain;  Surgeon: Jon Leblanc M.D.;  Location: [facility];  Service:    • OTHER SURGICAL PROCEDURE Right 1/2016     second debridement right breast   • CATH PLACEMENT Right 9/8/2015    Procedure: CATH PLACEMENT PORT;  Surgeon: Vargas Hyde M.D.;  Location: [facility];  Service:    • OTHER SURGICAL PROCEDURE  9/8/2015    Port placement for chemo   • MASTECTOMY MODIFIED RADICAL Left 8/10/2015    Procedure: MASTECTOMY MODIFIED RADICAL;  Surgeon: Vargas Hyde M.D.;  Location: [facility];  Service:    • MASTECTOMY Right 8/10/2015    Procedure: MASTECTOMY SIMPLE;  Surgeon: Vargas Hyde M.D.;  Location: [facility];  Service:    • NODE BIOPSY SENTINEL Left 8/10/2015    Procedure: NODE BIOPSY SENTINEL;   "Surgeon: Vargas Hyde M.D.;  Location: SURGERY [facility];  Service:    • BREAST RECONSTRUCTION Bilateral 8/10/2015    Procedure: BREAST RECONSTRUCTION VIA;  Surgeon: Johnny Flannery M.D.;  Location: SURGERY [facility];  Service:    • TISSUE EXPANDER PLACE/REMOVE Bilateral 8/10/2015    Procedure: TISSUE EXPANDER PLACE/REMOVE & POSSIBLE ALLODERM;  Surgeon: Johnny Flannery M.D.;  Location: SURGERY [facility];  Service:    • ROTATOR CUFF REPAIR Right 2009    right   • HIP ARTHROPLASTY TOTAL Right 2008    Hip Replacement, Total, right   • HAND SURGERY Right 2/12/07    joint Right Thumb   • ABDOMINOPLASTY  10/28/04   • GASTRIC BYPASS LAPAROSCOPIC  10/10/01   • KNEE ARTHROPLASTY TOTAL Bilateral 1/24/00    bilateral knees   • OTHER  9/99    tumor exc Right hip   • INGRID BY LAPAROSCOPY  9/97   • BLADDER SUSPENSION  12/93   • HYSTERECTOMY, TOTAL ABDOMINAL  5/93    rectal, bladder repair   • EAR MIDDLE EXPLORATION Left 4/90    Left   • BREAST BIOPSY Left 6/85   • TUBAL LIGATION  3/84   • ARTHROSCOPY, KNEE     • CHOLECYSTECTOMY     • COLON RESECTION     • HIP REPLACEMENT, TOTAL     • KNEE ARTHROSCOPY Left 1983, 12/97    Left   • KNEE ARTHROSCOPY Right 6/88, 12/91, 11/97    Right   • OTHER  5/97, 4/05/05    vaginal repair   • OTHER SURGICAL PROCEDURE  4/06, 10/06    bilateral \"arm lift\"   • SLEEVE,ARLEEN VASO THIGH     • TONSILLECTOMY         FAMILY HISTORY:   Family History   Problem Relation Age of Onset   • Kidney Disease Mother    • Heart Failure Father         SOCIAL HISTORY:   Social History     Socioeconomic History   • Marital status:      Spouse name: Not on file   • Number of children: Not on file   • Years of education: Not on file   • Highest education level: Not on file   Occupational History   • Not on file   Social Needs   • Financial resource strain: Not on file   • Food insecurity     Worry: Not on file     Inability: Not on file   • Transportation needs     Medical: Not on file     " Non-medical: Not on file   Tobacco Use   • Smoking status: Never Smoker   • Smokeless tobacco: Never Used   Substance and Sexual Activity   • Alcohol use: Yes     Comment: 2-3 per month; 1/week   • Drug use: No   • Sexual activity: Not on file   Lifestyle   • Physical activity     Days per week: Not on file     Minutes per session: Not on file   • Stress: Not on file   Relationships   • Social connections     Talks on phone: Not on file     Gets together: Not on file     Attends Confucianist service: Not on file     Active member of club or organization: Not on file     Attends meetings of clubs or organizations: Not on file     Relationship status: Not on file   • Intimate partner violence     Fear of current or ex partner: Not on file     Emotionally abused: Not on file     Physically abused: Not on file     Forced sexual activity: Not on file   Other Topics Concern   • Not on file   Social History Narrative   • Not on file       ALLERGIES:   Allergies   Allergen Reactions   • Clindamycin Shortness of Breath and Rash     .   • Levaquin Shortness of Breath   • Penicillins Shortness of Breath and Rash     Tolerated Ancef 08/10/15   • Amlodipine      swelling   • Ampicillin      Tolerated Cefazolin on 08/10/15   • Demerol Itching     Itching and rash     • Lisinopril Cough        CURRENT MEDICATIONS:     Current Outpatient Medications:   •  cefUROXime (CEFTIN) 500 MG Tab, Take 500 mg by mouth 2 times a day. x7 days, Disp: , Rfl:   •  ferrous sulfate 325 (65 Fe) MG tablet, Take 325 mg by mouth every day., Disp: , Rfl:   •  doxylamine (UNISOM) 25 MG Tab tablet, Take 25 mg by mouth every bedtime., Disp: , Rfl:   •  polyethylene glycol/lytes (MIRALAX) Pack, Take 17 g by mouth every day., Disp: , Rfl:   •  fluticasone (FLONASE ALLERGY RELIEF) 50 MCG/ACT nasal spray, Flonase Allergy Relief, Disp: , Rfl:   •  triamcinolone acetonide (KENALOG) 0.1 % Cream, apply to affected area twice a day 5 DAYS ON AND 2 DAYS OFF CYCLES if  needed, Disp: , Rfl:   •  Desvenlafaxine Succinate (PRISTIQ) 100 MG TABLET SR 24 HR, Pristiq 100 mg tablet,extended release  Take 1 tablet every day by oral route., Disp: , Rfl:   •  fluvoxAMINE (LUVOX) 50 MG Tab, Luvox 50 mg tablet  Take 2 tablets every day by oral route., Disp: , Rfl:   •  folic acid (FOLATE) 400 MCG tablet, Take 800 mcg by mouth every day., Disp: , Rfl:   •  Coenzyme Q10 (COQ10 PO), Take 300 mg by mouth every day., Disp: , Rfl:   •  Cyanocobalamin (VITAMIN B-12) 1000 MCG Tab, Take 3,000 mcg by mouth every day., Disp: , Rfl:   •  Multiple Vitamins-Minerals (MULTIVITAMIN ADULT PO), Take  by mouth every day., Disp: , Rfl:   •  aspirin 81 MG tablet, Take 81 mg by mouth every day., Disp: , Rfl:   •  acetaminophen (TYLENOL) 500 MG Tab, Take 500-1,000 mg by mouth every 6 hours as needed., Disp: , Rfl:   •  aripiprazole (ABILIFY) 5 MG tablet, Take 5 mg by mouth every morning. Indications: Major Depressive Disorder, Disp: , Rfl:   •  esomeprazole (NEXIUM) 40 MG delayed-release capsule, Take 40 mg by mouth. 3 times/week  Indications: Gastroesophageal Reflux Disease with Current Symptoms, Disp: , Rfl:   •  alprazolam (XANAX) 0.5 MG TABS, Take 0.5 mg by mouth as needed for Anxiety. Indications: Feeling Anxious, Disp: , Rfl:   •  Probiotic Product (PROBIOTIC DAILY PO), Take 1 Cap by mouth every day., Disp: , Rfl:   •  CELEBREX 200 MG PO CAPS, Take 200 mg by mouth. Takes 3x per week on Mon, Wed, Fri  Indications: Joint Damage causing Pain and Loss of Function, Disp: , Rfl:      LABS REVIEWED:  Lab Results   Component Value Date/Time    SODIUM 136 01/06/2016 02:55 PM    POTASSIUM 3.9 01/06/2016 02:55 PM    CHLORIDE 104 01/06/2016 02:55 PM    CO2 27 01/06/2016 02:55 PM    GLUCOSE 98 01/06/2016 02:55 PM    BUN 9 01/06/2016 02:55 PM    CREATININE 0.80 01/06/2016 02:55 PM    CREATININE 1.1 03/17/2008 03:05 PM      Lab Results   Component Value Date/Time    PROTHROMBTM 13.2 06/16/2020 09:45 AM    INR 0.98 06/16/2020  09:45 AM      Lab Results   Component Value Date/Time    WBC 4.1 (L) 06/16/2020 09:45 AM    RBC 4.24 06/16/2020 09:45 AM    HEMOGLOBIN 12.8 06/16/2020 09:45 AM    HEMATOCRIT 38.2 06/16/2020 09:45 AM    MCV 90.1 06/16/2020 09:45 AM    MCH 30.2 06/16/2020 09:45 AM    MCHC 33.5 (L) 06/16/2020 09:45 AM    RDW 45.1 06/16/2020 09:45 AM    PLATELETCT 258 06/16/2020 09:45 AM    MPV 8.7 (L) 06/16/2020 09:45 AM    NEUTSPOLYS 75.10 (H) 06/28/2017 04:48 AM    LYMPHOCYTES 13.20 (L) 06/28/2017 04:48 AM    MONOCYTES 10.70 06/28/2017 04:48 AM    EOSINOPHILS 0.30 06/28/2017 04:48 AM    BASOPHILS 0.30 06/28/2017 04:48 AM    HYPOCHROMIA 1+ 03/31/2005 12:00 PM    ANISOCYTOSIS 1+ 01/06/2016 02:55 PM        IMAGING REVIEWED AND INTERPRETED:    ECHOCARDIOGRAM: 6/9/2020  CONCLUSIONS  Compared to the images of the study done 11/13/18  - there has been   interval increase in aortic stenosis, previously moderate.  Normal left ventricular systolic function.  Left ventricular ejection fraction is visually estimated to be 65%.  Normal diastolic function.  Normal right ventricular size and systolic function.  Mild mitral regurgitation.  Severe aortic stenosis.  Mild aortic insufficiency.    ANGIOGRAM: None available.     REVIEW OF SYSTEMS:   Review of Systems   Constitutional: Negative for chills, diaphoresis, fever and weight loss.   HENT: Negative for congestion, ear pain, hearing loss, nosebleeds, sore throat and tinnitus.    Eyes: Negative for blurred vision, double vision, pain and discharge.   Respiratory: Positive for shortness of breath. Negative for cough, hemoptysis, sputum production, wheezing and stridor.    Cardiovascular: Negative for chest pain, palpitations, orthopnea and leg swelling.   Gastrointestinal: Negative for abdominal pain, constipation, heartburn, melena, nausea and vomiting.   Genitourinary: Negative for dysuria, flank pain and hematuria.   Musculoskeletal: Positive for joint pain. Negative for myalgias and neck pain.  "  Skin: Negative for itching and rash.   Neurological: Negative for dizziness, speech change, focal weakness, seizures, weakness and headaches.   Endo/Heme/Allergies: Negative for environmental allergies and polydipsia. Does not bruise/bleed easily.   Psychiatric/Behavioral: Negative for depression, hallucinations, substance abuse and suicidal ideas.       PHYSICAL EXAMINATION:   Physical Exam   Constitutional: She is oriented to person, place, and time and well-developed, well-nourished, and in no distress. No distress.   HENT:   Head: Normocephalic and atraumatic.   Nose: Nose normal.   Mouth/Throat: Oropharynx is clear and moist.   Eyes: Pupils are equal, round, and reactive to light. Conjunctivae and EOM are normal.   Neck: Normal range of motion. Neck supple. No JVD present. No tracheal deviation present.   Cardiovascular: Normal rate and regular rhythm.   Murmur heard.  Pulmonary/Chest: Effort normal and breath sounds normal. No stridor. No respiratory distress.   Abdominal: Soft. Bowel sounds are normal. She exhibits no distension. There is no abdominal tenderness.   Musculoskeletal: Normal range of motion.         General: No tenderness or edema.   Neurological: She is alert and oriented to person, place, and time. Gait normal. Coordination normal. GCS score is 15.   Skin: Skin is warm and dry.   Psychiatric: Mood, memory and affect normal.     CONSTITUTIONAL:  /62 (BP Location: Right arm, Patient Position: Sitting, BP Cuff Size: Adult)   Pulse 67   Temp 36.7 °C (98.1 °F) (Temporal)   Ht 1.626 m (5' 4\")   Wt 62.1 kg (137 lb)   SpO2 97%         IMPRESSION:  Severe symptomatic aortic stenosis, bilateral carotid artery stenosis, history of breast cancer, lung nodule status post biopsy, anemia, sleep apnea,      PLAN:  I recommend this patient continue undergoing work-up for transcatheter aortic valve replacement.  Given her age and her comorbidities, I feel she would be better served with " catheter-based intervention.  The procedure, its risks, benefits, potential complications and alternative treatments were discussed with the patient in detail including the risks should she decide not to undergo my recommended treatment. All of her questions were answered to her satisfaction and she is willing to proceed with the operation. The risks include death, stroke,  infection: to include a rare bacterial infection related to the use of the heart/lung machine, matthieu-operative myocardial infarction, dysrhythmias, diaphragmatic paralysis, chest wall paresthesia, tracheostomy, kidney or other organ failure, possible return to the operating room for bleeding, bleeding requiring transfusion with its attendant risks including AIDS or hepatitis, dehiscence of surgical incisions, respiratory complications including the need for prolonged ventilator support, Protamine or other drug reaction, peripheral neuropathy, loss of limb, and miscount of surgical items. The operative mortality risk is approximately 3 %. The STS mortality risk score is 2.2% and the morbidity and mortality risk score is 8.7%. The scores were discussed with patient.      Sincerely,       David Bernardo DO.

## 2020-06-18 NOTE — TELEPHONE ENCOUNTER
Patient has been referred to valve program since echo resulted.    Your patient has been flagged through our [facility] program with the following echocardiogram results:    Severe Aortic Stenosis    Ayesha Allison DNP, Cardiology APRN, AGACNP-BC

## 2020-06-23 ENCOUNTER — OFFICE VISIT (OUTPATIENT)
Dept: CARDIOTHORACIC SURGERY | Facility: MEDICAL CENTER | Age: 75
End: 2020-06-23
Payer: MEDICARE

## 2020-06-23 VITALS
SYSTOLIC BLOOD PRESSURE: 116 MMHG | TEMPERATURE: 98.1 F | OXYGEN SATURATION: 97 % | WEIGHT: 137 LBS | HEIGHT: 64 IN | DIASTOLIC BLOOD PRESSURE: 62 MMHG | HEART RATE: 67 BPM | BODY MASS INDEX: 23.39 KG/M2

## 2020-06-23 DIAGNOSIS — I35.0 SEVERE AORTIC STENOSIS: ICD-10-CM

## 2020-06-23 PROCEDURE — 99205 OFFICE O/P NEW HI 60 MIN: CPT | Performed by: THORACIC SURGERY (CARDIOTHORACIC VASCULAR SURGERY)

## 2020-06-23 ASSESSMENT — ENCOUNTER SYMPTOMS
SPEECH CHANGE: 0
WHEEZING: 0
DEPRESSION: 0
FOCAL WEAKNESS: 0
FEVER: 0
MYALGIAS: 0
DOUBLE VISION: 0
SPUTUM PRODUCTION: 0
DIAPHORESIS: 0
WEIGHT LOSS: 0
PALPITATIONS: 0
VOMITING: 0
POLYDIPSIA: 0
COUGH: 0
ABDOMINAL PAIN: 0
EYE PAIN: 0
NAUSEA: 0
SHORTNESS OF BREATH: 1
CONSTIPATION: 0
STRIDOR: 0
FLANK PAIN: 0
HEARTBURN: 0
BLURRED VISION: 0
HEADACHES: 0
BRUISES/BLEEDS EASILY: 0
ORTHOPNEA: 0
DIZZINESS: 0
SORE THROAT: 0
HALLUCINATIONS: 0
WEAKNESS: 0
NECK PAIN: 0
HEMOPTYSIS: 0
CHILLS: 0
EYE DISCHARGE: 0
SEIZURES: 0

## 2020-06-23 ASSESSMENT — LIFESTYLE VARIABLES: SUBSTANCE_ABUSE: 0

## 2020-06-29 ENCOUNTER — TELEPHONE (OUTPATIENT)
Dept: CARDIOLOGY | Facility: MEDICAL CENTER | Age: 75
End: 2020-06-29

## 2020-06-29 NOTE — TELEPHONE ENCOUNTER
Spoke with patient regarding valve clinic referral. Discussed in detail IC consult, CTA, and carotid, as well as cardiac cath.    Patient agrees to IC consult 7/7 slot B and testing 7/8. Reviewed that cardiac cath will be arranged at her IC consult.     Patient states understanding, states no further questions at this time, and thankful for assistance today.

## 2020-06-29 NOTE — PROGRESS NOTES
Received word that patient called regarding scheduling for a CTA.  She was declined for open heart surgery and is on the TAVR pathway and will need workup through cardiology.  I will call her and refer her to their office for further test scheduling.  She was provided with names and numbers to call to get things underway.    Call time 4 1/2 minutes.

## 2020-07-01 DIAGNOSIS — I35.0 SEVERE AORTIC STENOSIS: ICD-10-CM

## 2020-07-01 DIAGNOSIS — R06.02 SHORTNESS OF BREATH: ICD-10-CM

## 2020-07-01 DIAGNOSIS — Z01.810 PRE-OPERATIVE CARDIOVASCULAR EXAMINATION: ICD-10-CM

## 2020-07-07 ENCOUNTER — DOCUMENTATION (OUTPATIENT)
Dept: CARDIOLOGY | Facility: MEDICAL CENTER | Age: 75
End: 2020-07-07

## 2020-07-07 ENCOUNTER — OFFICE VISIT (OUTPATIENT)
Dept: CARDIOLOGY | Facility: MEDICAL CENTER | Age: 75
End: 2020-07-07
Payer: MEDICARE

## 2020-07-07 ENCOUNTER — HOSPITAL ENCOUNTER (OUTPATIENT)
Dept: LAB | Facility: MEDICAL CENTER | Age: 75
End: 2020-07-07
Attending: INTERNAL MEDICINE
Payer: MEDICARE

## 2020-07-07 ENCOUNTER — TELEPHONE (OUTPATIENT)
Dept: CARDIOLOGY | Facility: MEDICAL CENTER | Age: 75
End: 2020-07-07

## 2020-07-07 VITALS
HEIGHT: 64 IN | BODY MASS INDEX: 26.42 KG/M2 | SYSTOLIC BLOOD PRESSURE: 132 MMHG | DIASTOLIC BLOOD PRESSURE: 76 MMHG | OXYGEN SATURATION: 97 % | WEIGHT: 154.76 LBS | RESPIRATION RATE: 14 BRPM | HEART RATE: 79 BPM

## 2020-07-07 DIAGNOSIS — I35.0 SEVERE AORTIC STENOSIS: ICD-10-CM

## 2020-07-07 DIAGNOSIS — Z01.810 PRE-OPERATIVE CARDIOVASCULAR EXAMINATION: ICD-10-CM

## 2020-07-07 DIAGNOSIS — R06.02 SHORTNESS OF BREATH: ICD-10-CM

## 2020-07-07 LAB
ALBUMIN SERPL BCP-MCNC: 4.8 G/DL (ref 3.2–4.9)
ALBUMIN/GLOB SERPL: 1.8 G/DL
ALP SERPL-CCNC: 98 U/L (ref 30–99)
ALT SERPL-CCNC: 20 U/L (ref 2–50)
ANION GAP SERPL CALC-SCNC: 15 MMOL/L (ref 7–16)
AST SERPL-CCNC: 21 U/L (ref 12–45)
BILIRUB SERPL-MCNC: 0.4 MG/DL (ref 0.1–1.5)
BUN SERPL-MCNC: 17 MG/DL (ref 8–22)
CALCIUM SERPL-MCNC: 9.8 MG/DL (ref 8.5–10.5)
CHLORIDE SERPL-SCNC: 89 MMOL/L (ref 96–112)
CO2 SERPL-SCNC: 26 MMOL/L (ref 20–33)
CREAT SERPL-MCNC: 0.87 MG/DL (ref 0.5–1.4)
EKG IMPRESSION: NORMAL
GLOBULIN SER CALC-MCNC: 2.7 G/DL (ref 1.9–3.5)
GLUCOSE SERPL-MCNC: 96 MG/DL (ref 65–99)
POTASSIUM SERPL-SCNC: 3.9 MMOL/L (ref 3.6–5.5)
PROT SERPL-MCNC: 7.5 G/DL (ref 6–8.2)
SODIUM SERPL-SCNC: 130 MMOL/L (ref 135–145)

## 2020-07-07 PROCEDURE — 36415 COLL VENOUS BLD VENIPUNCTURE: CPT

## 2020-07-07 PROCEDURE — 93000 ELECTROCARDIOGRAM COMPLETE: CPT | Performed by: INTERNAL MEDICINE

## 2020-07-07 PROCEDURE — 80053 COMPREHEN METABOLIC PANEL: CPT

## 2020-07-07 PROCEDURE — 99205 OFFICE O/P NEW HI 60 MIN: CPT | Performed by: INTERNAL MEDICINE

## 2020-07-07 NOTE — PROGRESS NOTES
Reason for Consult:  Asked by Dr Alfaro to see this patient with  Severe aortic stenosis  Patient's PCP: Denise Mcnulty M.D.    CC:   Chief Complaint   Patient presents with   • Aortic Stenosis     valve NP        HPI: 75-year-old female patient referred by Dr. Alfaro for structural heart consultation regarding severe aortic stenosis.  She has history of breast cancer, obstructive sleep apnea, lung nodule recent biopsy was negative for cancer, multiple prior surgeries with severe aortic stenosis.  For last few months she has been progressive decline in her energy, dyspnea on exertion, which is gradually getting worse.  No chest pain lightheadedness or syncope.    Medications / Drug list prior to admission:  Current Outpatient Medications on File Prior to Visit   Medication Sig Dispense Refill   • ferrous sulfate 325 (65 Fe) MG tablet Take 325 mg by mouth every day.     • doxylamine (UNISOM) 25 MG Tab tablet Take 25 mg by mouth every bedtime.     • fluticasone (FLONASE ALLERGY RELIEF) 50 MCG/ACT nasal spray Flonase Allergy Relief     • Desvenlafaxine Succinate (PRISTIQ) 100 MG TABLET SR 24 HR Pristiq 100 mg tablet,extended release   Take 1 tablet every day by oral route.     • fluvoxAMINE (LUVOX) 50 MG Tab Luvox 50 mg tablet   Take 2 tablets every day by oral route.     • folic acid (FOLATE) 400 MCG tablet Take 800 mcg by mouth every day.     • Coenzyme Q10 (COQ10 PO) Take 300 mg by mouth every day.     • Cyanocobalamin (VITAMIN B-12) 1000 MCG Tab Take 3,000 mcg by mouth every day.     • Multiple Vitamins-Minerals (MULTIVITAMIN ADULT PO) Take  by mouth every day.     • aspirin 81 MG tablet Take 81 mg by mouth every day.     • acetaminophen (TYLENOL) 500 MG Tab Take 500-1,000 mg by mouth every 6 hours as needed.     • aripiprazole (ABILIFY) 5 MG tablet Take 5 mg by mouth every morning. Indications: Major Depressive Disorder     • esomeprazole (NEXIUM) 40 MG delayed-release capsule Take 40 mg by mouth. 3 times/week   Indications: Gastroesophageal Reflux Disease with Current Symptoms     • alprazolam (XANAX) 0.5 MG TABS Take 0.5 mg by mouth as needed for Anxiety. Indications: Feeling Anxious     • Probiotic Product (PROBIOTIC DAILY PO) Take 1 Cap by mouth every day.     • CELEBREX 200 MG PO CAPS Take 200 mg by mouth. Takes 3x per week on Mon, Wed, Fri  Indications: Joint Damage causing Pain and Loss of Function     • cefUROXime (CEFTIN) 500 MG Tab Take 500 mg by mouth 2 times a day. x7 days     • polyethylene glycol/lytes (MIRALAX) Pack Take 17 g by mouth every day.     • triamcinolone acetonide (KENALOG) 0.1 % Cream apply to affected area twice a day 5 DAYS ON AND 2 DAYS OFF CYCLES if needed       No current facility-administered medications on file prior to visit.        Current list of administered Medications:    Current Outpatient Medications:   •  ferrous sulfate 325 (65 Fe) MG tablet, Take 325 mg by mouth every day., Disp: , Rfl:   •  doxylamine (UNISOM) 25 MG Tab tablet, Take 25 mg by mouth every bedtime., Disp: , Rfl:   •  fluticasone (FLONASE ALLERGY RELIEF) 50 MCG/ACT nasal spray, Flonase Allergy Relief, Disp: , Rfl:   •  Desvenlafaxine Succinate (PRISTIQ) 100 MG TABLET SR 24 HR, Pristiq 100 mg tablet,extended release  Take 1 tablet every day by oral route., Disp: , Rfl:   •  fluvoxAMINE (LUVOX) 50 MG Tab, Luvox 50 mg tablet  Take 2 tablets every day by oral route., Disp: , Rfl:   •  folic acid (FOLATE) 400 MCG tablet, Take 800 mcg by mouth every day., Disp: , Rfl:   •  Coenzyme Q10 (COQ10 PO), Take 300 mg by mouth every day., Disp: , Rfl:   •  Cyanocobalamin (VITAMIN B-12) 1000 MCG Tab, Take 3,000 mcg by mouth every day., Disp: , Rfl:   •  Multiple Vitamins-Minerals (MULTIVITAMIN ADULT PO), Take  by mouth every day., Disp: , Rfl:   •  aspirin 81 MG tablet, Take 81 mg by mouth every day., Disp: , Rfl:   •  acetaminophen (TYLENOL) 500 MG Tab, Take 500-1,000 mg by mouth every 6 hours as needed., Disp: , Rfl:   •   "aripiprazole (ABILIFY) 5 MG tablet, Take 5 mg by mouth every morning. Indications: Major Depressive Disorder, Disp: , Rfl:   •  esomeprazole (NEXIUM) 40 MG delayed-release capsule, Take 40 mg by mouth. 3 times/week  Indications: Gastroesophageal Reflux Disease with Current Symptoms, Disp: , Rfl:   •  alprazolam (XANAX) 0.5 MG TABS, Take 0.5 mg by mouth as needed for Anxiety. Indications: Feeling Anxious, Disp: , Rfl:   •  Probiotic Product (PROBIOTIC DAILY PO), Take 1 Cap by mouth every day., Disp: , Rfl:   •  CELEBREX 200 MG PO CAPS, Take 200 mg by mouth. Takes 3x per week on Mon, Wed, Fri  Indications: Joint Damage causing Pain and Loss of Function, Disp: , Rfl:   •  cefUROXime (CEFTIN) 500 MG Tab, Take 500 mg by mouth 2 times a day. x7 days, Disp: , Rfl:   •  polyethylene glycol/lytes (MIRALAX) Pack, Take 17 g by mouth every day., Disp: , Rfl:   •  triamcinolone acetonide (KENALOG) 0.1 % Cream, apply to affected area twice a day 5 DAYS ON AND 2 DAYS OFF CYCLES if needed, Disp: , Rfl:     Past Medical History:   Diagnosis Date   • Anemia    • Anesthesia     \"very low blood pressure\"   • Arthritis     osteo, \"all over\"   • Bilateral carotid artery stenosis    • Breast cancer (HCC)     left   • Breath shortness     from aortic stenosis   • Bronchitis    • Cancer (HCC)     skin   • Cancer (HCC) 2015    breast   • Cataract 06/11/2020    no surgery   • Dental disorder     upper implants front teeth, crowns   • Depression    • GERD (gastroesophageal reflux disease)    • Heart burn    • Heart murmur    • Indigestion    • Joint replacement     bilateral knees and right hip   • Moderate aortic stenosis     severe per pt   • Nasal drainage    • Obesity    • Osteoporosis    • Psychiatric problem     anxiety/depression   • Renal disorder 06/11/2020    kidney stone   • Restless leg syndrome    • Sleep apnea     CPAP   • Snoring    • Umbilical hernia 10/2016   • Unspecified urinary incontinence    • Urinary bladder " "disorder 6/21/2017    reports freq infections but none in the past year; on ABX, + bacteria in \"gut\"       Past Surgical History:   Procedure Laterality Date   • LATISSIMUS FLAP Left 6/27/2017    Procedure: LATISSIMUS FLAP W/INSERTION OF IMPLANT;  Surgeon: Johnny Flannery M.D.;  Location: [facility];  Service:    • BREAST IMPLANT REMOVAL Left 6/27/2017    Procedure: BREAST IMPLANT REMOVAL;  Surgeon: Johnny Flannery M.D.;  Location: [facility];  Service:    • CAPSULECTOMY  4/13/2017    and debridement left breast   • BOWEL RESECTION  12/28/2016   • BREAST RECONSTRUCTION Bilateral 11/29/2016    Procedure: BREAST RECONSTRUCTION;  Surgeon: Johnny Flannery M.D.;  Location: [facility];  Service:    • LATISSIMUS FLAP Right 11/29/2016    Procedure: LATISSIMUS FLAP W/ IMPLANT;  Surgeon: Johnny Flannery M.D.;  Location: [facility];  Service:    • TISSUE EXPANDER PLACE/REMOVE Left 11/29/2016    Procedure: TISSUE EXPANDER REMOVE - REMOVAL AND INSERTION OF IMPLANT;  Surgeon: Johnny Flannery M.D.;  Location: [facility];  Service:    • CAPSULECTOMY Bilateral 11/29/2016    Procedure: CAPSULECTOMY;  Surgeon: Johnny Flannery M.D.;  Location: [facility];  Service:    • OTHER SURGICAL PROCEDURE Right 7/2016    debridement and breast expander placement   • BREAST IMPLANT REMOVAL Right 1/6/2016    Procedure: BREAST IMPLANT REMOVAL, placement of drain;  Surgeon: Jon Leblanc M.D.;  Location: [facility];  Service:    • OTHER SURGICAL PROCEDURE Right 1/2016     second debridement right breast   • CATH PLACEMENT Right 9/8/2015    Procedure: CATH PLACEMENT PORT;  Surgeon: Vargas Hyde M.D.;  Location: [facility];  Service:    • OTHER SURGICAL PROCEDURE  9/8/2015    Port placement for chemo   • MASTECTOMY MODIFIED RADICAL Left 8/10/2015    Procedure: MASTECTOMY MODIFIED RADICAL;  Surgeon: Vragas Hyde M.D.;  " "Location: SURGERY [facility];  Service:    • MASTECTOMY Right 8/10/2015    Procedure: MASTECTOMY SIMPLE;  Surgeon: Vargas Hyde M.D.;  Location: SURGERY [facility];  Service:    • NODE BIOPSY SENTINEL Left 8/10/2015    Procedure: NODE BIOPSY SENTINEL;  Surgeon: Vargas Hyde M.D.;  Location: SURGERY [facility];  Service:    • BREAST RECONSTRUCTION Bilateral 8/10/2015    Procedure: BREAST RECONSTRUCTION VIA;  Surgeon: Johnny Flannery M.D.;  Location: [facility];  Service:    • TISSUE EXPANDER PLACE/REMOVE Bilateral 8/10/2015    Procedure: TISSUE EXPANDER PLACE/REMOVE & POSSIBLE ALLODERM;  Surgeon: Johnny Flannery M.D.;  Location: [facility];  Service:    • ROTATOR CUFF REPAIR Right 2009    right   • HIP ARTHROPLASTY TOTAL Right 2008    Hip Replacement, Total, right   • HAND SURGERY Right 2/12/07    joint Right Thumb   • ABDOMINOPLASTY  10/28/04   • GASTRIC BYPASS LAPAROSCOPIC  10/10/01   • KNEE ARTHROPLASTY TOTAL Bilateral 1/24/00    bilateral knees   • OTHER  9/99    tumor exc Right hip   • INGRID BY LAPAROSCOPY  9/97   • BLADDER SUSPENSION  12/93   • HYSTERECTOMY, TOTAL ABDOMINAL  5/93    rectal, bladder repair   • EAR MIDDLE EXPLORATION Left 4/90    Left   • BREAST BIOPSY Left 6/85   • TUBAL LIGATION  3/84   • ARTHROSCOPY, KNEE     • CHOLECYSTECTOMY     • COLON RESECTION     • HIP REPLACEMENT, TOTAL     • KNEE ARTHROSCOPY Left 1983, 12/97    Left   • KNEE ARTHROSCOPY Right 6/88, 12/91, 11/97    Right   • OTHER  5/97, 4/05/05    vaginal repair   • OTHER SURGICAL PROCEDURE  4/06, 10/06    bilateral \"arm lift\"   • SLEEVE,ARLEEN VASO THIGH     • TONSILLECTOMY         Family History   Problem Relation Age of Onset   • Kidney Disease Mother    • Heart Failure Father        Social History     Socioeconomic History   • Marital status:      Spouse name: Not on file   • Number of children: Not on file   • Years of education: Not on file   • Highest education level: Not on " file   Occupational History   • Not on file   Social Needs   • Financial resource strain: Not on file   • Food insecurity     Worry: Not on file     Inability: Not on file   • Transportation needs     Medical: Not on file     Non-medical: Not on file   Tobacco Use   • Smoking status: Never Smoker   • Smokeless tobacco: Never Used   Substance and Sexual Activity   • Alcohol use: Yes     Comment: 2-3 per month; 1/week   • Drug use: No   • Sexual activity: Not on file   Lifestyle   • Physical activity     Days per week: Not on file     Minutes per session: Not on file   • Stress: Not on file   Relationships   • Social connections     Talks on phone: Not on file     Gets together: Not on file     Attends Gnosticism service: Not on file     Active member of club or organization: Not on file     Attends meetings of clubs or organizations: Not on file     Relationship status: Not on file   • Intimate partner violence     Fear of current or ex partner: Not on file     Emotionally abused: Not on file     Physically abused: Not on file     Forced sexual activity: Not on file   Other Topics Concern   • Not on file   Social History Narrative   • Not on file       ALLERGIES:  Allergies   Allergen Reactions   • Clindamycin Shortness of Breath and Rash     .   • Levaquin Shortness of Breath   • Penicillins Shortness of Breath and Rash     Tolerated Ancef 08/10/15   • Amlodipine      swelling   • Ampicillin      Tolerated Cefazolin on 08/10/15   • Demerol Itching     Itching and rash     • Lisinopril Cough       Review of systems:  A detailed review of symptoms was reviewed with patient. This is reviewed in H&P and PMH. ALL OTHERS reviewed and negative    Physical exam:  Blood pressure 132/76, pulse 79, height 5 4, weight 154 pounds  General: No acute distress.   EYES: no jaundice  HEENT: OP clear   Neck: No bruits No JVD.   CVS:   RRR. S1 + S2.  3 x 6 blowing murmur aortic area  Resp: CTAB. No wheezing or  crackles/rhonchi.  Abdomen: Soft, NT, ND,  Skin: Grossly nothing acute no obvious rashes  Neurological: Alert, Moves all extremities, no cranial nerve defects on limited exam  Extremities: Pulse 2+ in b/l LE.  no edema. No cyanosis.       Data:  Laboratory studies:  Recent Results (from the past 24 hour(s))   EKG    Collection Time: 20  3:33 PM   Result Value Ref Range    Report       [facility]    Test Date:  2020  Pt Name:    NAPOLEON PETERSON                Department: [facility]  MRN:        [id number]                      Room:  Gender:     Female                       Technician: SHAYY  :        1945                   Requested By:CAROL ZAVALA  Order #:    [id number]                    Reading MD:    Measurements  Intervals                                Axis  Rate:       76                           P:          51  MI:         176                          QRS:        -8  QRSD:       96                           T:          47  QT:         428  QTc:        482    Interpretive Statements  Sinus rhythm  Ventricular premature complex  Probable left atrial enlargement  Left ventricular hypertrophy  Compared to ECG 10/12/2017 13:06:45  Ventricular premature complex(es) now present  Left ventricular hypertrophy now present  First degree AV block no longer present  T-wave abnormality no longer present         Imaging:  No orders to display     Echo 20  Compared to the images of the study done 18  - there has been   interval increase in aortic stenosis, previously moderate.  Normal left ventricular systolic function.  Left ventricular ejection fraction is visually estimated to be 65%.  Normal diastolic function.  Normal right ventricular size and systolic function.  Mild mitral regurgitation.  Severe aortic stenosis.  Mild aortic insufficiency.   Aortic valve   area calculated from the continuity equation is 0.76 sq cm. Vmax is 4.4    m/s. Transvalvular gradients are - Peak: 78  mmHg, Mean: 50 mmHg.   Dimensionless index is 0.24. Mild aortic insufficiency.      EKG : personally reviewed by me sinus rhythm, PVC, left ventricular hypertrophy    All pertinent features of laboratory and imaging reviewed including primary images where applicable    Assessment / Plan:  history of breast cancer, obstructive sleep apnea, lung nodule recent biopsy was negative for cancer, multiple prior surgeries with severe symptomatic degenerative aortic stenosis, NYHA class III stage C symptoms.  She is a good candidate for transcatheter aortic valve replacement.  We will proceed with coronary angiograms, CT scans.  More than half an hour spent explaining pathophysiology treatment options, TAVR procedure, complications, recovery etc.    The risks, benefits, and alternatives to TAVR, general anesthesia and transesophageal echocardiogram were discussed in great detail. Specific risks mentioned include bleeding, infection, kidney damage, allergic reaction, cardiac perforation with possible tamponade requiring matthieu-cardiocentesis or possible open heart surgery if the patient is a candidate. Lastly the risks of heart attack, stroke, and death were discussed; the risks of major complications includingall cause mortality of 2.2%, disabling stroke of 1.1%, the risk on new pacemaker of 12% and the risk of vascular complications of 4.1%. The patient verbalized understanding of these potential complications and wishes to proceed with this procedure. ([facility]).  The procedure will be performed completely in collaboration with cardiac surgery.      It is my pleasure to participate in the care of Ms. Louis.  Please do not hesitate to contact me with questions or concerns.    Mukesh Reid M.D.    7/7/2020

## 2020-07-07 NOTE — TELEPHONE ENCOUNTER
Patient is scheduled on 7/10/2020 for a PRE-TAVR R &LHC.  Patient to check in at 7:30am for a 9:30am procedure.  No medication for pt to stop.  Updated H & P done by AK on 7/7/2020.  Pre-admit to call pt.

## 2020-07-07 NOTE — PROGRESS NOTES
APRN Psychosocial Assessment:    Mental Status Assessment:  Appearance: normal   Behavior: appropriate   Mood/Affect: appropriate  Thought process/content: appropriate  Cognition: appropriate  Functional ability: able to perform ADL   Dental Concerns: none, dental implants and crowns.   Dental Clearance warranted: n/a  Further mental assessment needed: not applicable     Post-op Plan of Care:  Support systems:  (yvrose)   Patient understands discharge plan: yes, lives in [place]  and needs to stay close to [place] after procedure   DME: wears cpap at night   Home health warranted prior to procedure: none   Social concerns for discharge: none  PCP alerted of social concerns: none    Advance directive: form provided   Goals: wants to achieve less fatigue and energy     Concerns prior to procedure: none    All patients questions and concerns were addressed during this visit. They understood pre-operative and post-operative plan of care.

## 2020-07-08 ENCOUNTER — HOSPITAL ENCOUNTER (OUTPATIENT)
Dept: RADIOLOGY | Facility: MEDICAL CENTER | Age: 75
End: 2020-07-08
Attending: INTERNAL MEDICINE
Payer: MEDICARE

## 2020-07-08 DIAGNOSIS — R06.02 SHORTNESS OF BREATH: ICD-10-CM

## 2020-07-08 DIAGNOSIS — I35.0 SEVERE AORTIC STENOSIS: ICD-10-CM

## 2020-07-08 DIAGNOSIS — Z01.810 PRE-OPERATIVE CARDIOVASCULAR EXAMINATION: ICD-10-CM

## 2020-07-08 DIAGNOSIS — Z01.812 PRE-OPERATIVE LABORATORY EXAMINATION: ICD-10-CM

## 2020-07-08 LAB
ABO GROUP BLD: NORMAL
APTT PPP: 27.2 SEC (ref 24.7–36)
BASOPHILS # BLD AUTO: 0.4 % (ref 0–1.8)
BASOPHILS # BLD: 0.03 K/UL (ref 0–0.12)
BLD GP AB SCN SERPL QL: NORMAL
EOSINOPHIL # BLD AUTO: 0.21 K/UL (ref 0–0.51)
EOSINOPHIL NFR BLD: 3.1 % (ref 0–6.9)
ERYTHROCYTE [DISTWIDTH] IN BLOOD BY AUTOMATED COUNT: 44.5 FL (ref 35.9–50)
HCT VFR BLD AUTO: 42.6 % (ref 37–47)
HGB BLD-MCNC: 14.2 G/DL (ref 12–16)
IMM GRANULOCYTES # BLD AUTO: 0.05 K/UL (ref 0–0.11)
IMM GRANULOCYTES NFR BLD AUTO: 0.7 % (ref 0–0.9)
INR PPP: 0.92 (ref 0.87–1.13)
LYMPHOCYTES # BLD AUTO: 1.19 K/UL (ref 1–4.8)
LYMPHOCYTES NFR BLD: 17.8 % (ref 22–41)
MCH RBC QN AUTO: 30 PG (ref 27–33)
MCHC RBC AUTO-ENTMCNC: 33.3 G/DL (ref 33.6–35)
MCV RBC AUTO: 89.9 FL (ref 81.4–97.8)
MONOCYTES # BLD AUTO: 0.52 K/UL (ref 0–0.85)
MONOCYTES NFR BLD AUTO: 7.8 % (ref 0–13.4)
NEUTROPHILS # BLD AUTO: 4.7 K/UL (ref 2–7.15)
NEUTROPHILS NFR BLD: 70.2 % (ref 44–72)
NRBC # BLD AUTO: 0 K/UL
NRBC BLD-RTO: 0 /100 WBC
NT-PROBNP SERPL IA-MCNC: 627 PG/ML (ref 0–125)
PLATELET # BLD AUTO: 280 K/UL (ref 164–446)
PMV BLD AUTO: 8.5 FL (ref 9–12.9)
PROTHROMBIN TIME: 12.7 SEC (ref 12–14.6)
RBC # BLD AUTO: 4.74 M/UL (ref 4.2–5.4)
RH BLD: NORMAL
WBC # BLD AUTO: 6.7 K/UL (ref 4.8–10.8)

## 2020-07-08 PROCEDURE — 93880 EXTRACRANIAL BILAT STUDY: CPT

## 2020-07-08 PROCEDURE — 86900 BLOOD TYPING SEROLOGIC ABO: CPT

## 2020-07-08 PROCEDURE — 74174 CTA ABD&PLVS W/CONTRAST: CPT

## 2020-07-08 PROCEDURE — 71275 CT ANGIOGRAPHY CHEST: CPT

## 2020-07-08 PROCEDURE — 86850 RBC ANTIBODY SCREEN: CPT

## 2020-07-08 PROCEDURE — 85025 COMPLETE CBC W/AUTO DIFF WBC: CPT

## 2020-07-08 PROCEDURE — 85610 PROTHROMBIN TIME: CPT

## 2020-07-08 PROCEDURE — 36415 COLL VENOUS BLD VENIPUNCTURE: CPT

## 2020-07-08 PROCEDURE — 700117 HCHG RX CONTRAST REV CODE 255: Performed by: INTERNAL MEDICINE

## 2020-07-08 PROCEDURE — 83880 ASSAY OF NATRIURETIC PEPTIDE: CPT

## 2020-07-08 PROCEDURE — 85730 THROMBOPLASTIN TIME PARTIAL: CPT

## 2020-07-08 PROCEDURE — 86901 BLOOD TYPING SEROLOGIC RH(D): CPT

## 2020-07-08 RX ADMIN — IOHEXOL 100 ML: 350 INJECTION, SOLUTION INTRAVENOUS at 10:07

## 2020-07-08 ASSESSMENT — FIBROSIS 4 INDEX: FIB4 SCORE: 1.37

## 2020-07-09 DIAGNOSIS — Z00.6 EXAMINATION OF PARTICIPANT IN CLINICAL TRIAL: ICD-10-CM

## 2020-07-09 DIAGNOSIS — I35.0 SEVERE AORTIC STENOSIS: ICD-10-CM

## 2020-07-10 ENCOUNTER — HOSPITAL ENCOUNTER (OUTPATIENT)
Facility: MEDICAL CENTER | Age: 75
End: 2020-07-10
Attending: INTERNAL MEDICINE | Admitting: INTERNAL MEDICINE
Payer: MEDICARE

## 2020-07-10 ENCOUNTER — APPOINTMENT (OUTPATIENT)
Dept: CARDIOLOGY | Facility: MEDICAL CENTER | Age: 75
End: 2020-07-10
Attending: INTERNAL MEDICINE
Payer: MEDICARE

## 2020-07-10 VITALS
DIASTOLIC BLOOD PRESSURE: 66 MMHG | OXYGEN SATURATION: 100 % | RESPIRATION RATE: 20 BRPM | HEART RATE: 63 BPM | HEIGHT: 64 IN | TEMPERATURE: 97.9 F | SYSTOLIC BLOOD PRESSURE: 156 MMHG | BODY MASS INDEX: 26.23 KG/M2 | WEIGHT: 153.66 LBS

## 2020-07-10 DIAGNOSIS — Z01.810 PRE-OPERATIVE CARDIOVASCULAR EXAMINATION: ICD-10-CM

## 2020-07-10 DIAGNOSIS — R06.02 SHORTNESS OF BREATH: ICD-10-CM

## 2020-07-10 DIAGNOSIS — I35.0 SEVERE AORTIC STENOSIS: ICD-10-CM

## 2020-07-10 PROCEDURE — 700117 HCHG RX CONTRAST REV CODE 255: Performed by: INTERNAL MEDICINE

## 2020-07-10 PROCEDURE — 99152 MOD SED SAME PHYS/QHP 5/>YRS: CPT | Performed by: INTERNAL MEDICINE

## 2020-07-10 PROCEDURE — 700101 HCHG RX REV CODE 250

## 2020-07-10 PROCEDURE — 160002 HCHG RECOVERY MINUTES (STAT)

## 2020-07-10 PROCEDURE — 700111 HCHG RX REV CODE 636 W/ 250 OVERRIDE (IP)

## 2020-07-10 PROCEDURE — 700105 HCHG RX REV CODE 258: Performed by: INTERNAL MEDICINE

## 2020-07-10 PROCEDURE — 93454 CORONARY ARTERY ANGIO S&I: CPT | Mod: 26 | Performed by: INTERNAL MEDICINE

## 2020-07-10 PROCEDURE — 93454 CORONARY ARTERY ANGIO S&I: CPT

## 2020-07-10 RX ORDER — SODIUM CHLORIDE 9 MG/ML
INJECTION, SOLUTION INTRAVENOUS CONTINUOUS
Status: DISCONTINUED | OUTPATIENT
Start: 2020-07-10 | End: 2020-07-10 | Stop reason: HOSPADM

## 2020-07-10 RX ORDER — VERAPAMIL HYDROCHLORIDE 2.5 MG/ML
INJECTION, SOLUTION INTRAVENOUS
Status: COMPLETED
Start: 2020-07-10 | End: 2020-07-10

## 2020-07-10 RX ORDER — MIDAZOLAM HYDROCHLORIDE 1 MG/ML
INJECTION INTRAMUSCULAR; INTRAVENOUS
Status: COMPLETED
Start: 2020-07-10 | End: 2020-07-10

## 2020-07-10 RX ORDER — HEPARIN SODIUM 200 [USP'U]/100ML
INJECTION, SOLUTION INTRAVENOUS
Status: COMPLETED
Start: 2020-07-10 | End: 2020-07-10

## 2020-07-10 RX ORDER — HEPARIN SODIUM,PORCINE 1000/ML
VIAL (ML) INJECTION
Status: COMPLETED
Start: 2020-07-10 | End: 2020-07-10

## 2020-07-10 RX ORDER — LIDOCAINE HYDROCHLORIDE 20 MG/ML
INJECTION, SOLUTION INFILTRATION; PERINEURAL
Status: COMPLETED
Start: 2020-07-10 | End: 2020-07-10

## 2020-07-10 RX ADMIN — HEPARIN SODIUM 2000 UNITS: 200 INJECTION, SOLUTION INTRAVENOUS at 11:13

## 2020-07-10 RX ADMIN — HEPARIN SODIUM: 1000 INJECTION, SOLUTION INTRAVENOUS; SUBCUTANEOUS at 11:13

## 2020-07-10 RX ADMIN — VERAPAMIL HYDROCHLORIDE 5 MG: 5 INJECTION INTRAVENOUS at 11:14

## 2020-07-10 RX ADMIN — IOHEXOL 17 ML: 350 INJECTION, SOLUTION INTRAVENOUS at 11:21

## 2020-07-10 RX ADMIN — MIDAZOLAM HYDROCHLORIDE 1 MG: 1 INJECTION, SOLUTION INTRAMUSCULAR; INTRAVENOUS at 11:20

## 2020-07-10 RX ADMIN — SODIUM CHLORIDE: 9 INJECTION, SOLUTION INTRAVENOUS at 08:22

## 2020-07-10 RX ADMIN — LIDOCAINE HYDROCHLORIDE: 20 INJECTION, SOLUTION INFILTRATION; PERINEURAL at 11:13

## 2020-07-10 RX ADMIN — FENTANYL CITRATE 50 MCG: 50 INJECTION INTRAMUSCULAR; INTRAVENOUS at 11:20

## 2020-07-10 RX ADMIN — NITROGLYCERIN 10 ML: 20 INJECTION INTRAVENOUS at 11:13

## 2020-07-10 ASSESSMENT — FIBROSIS 4 INDEX: FIB4 SCORE: 1.257788237343631704

## 2020-07-10 NOTE — DISCHARGE INSTRUCTIONS
ACTIVITY: Rest and take it easy for the first 24 hours.  A responsible adult is recommended to remain with you during that time.  It is normal to feel sleepy.  We encourage you to not do anything that requires balance, judgment or coordination.    MILD FLU-LIKE SYMPTOMS ARE NORMAL. YOU MAY EXPERIENCE GENERALIZED MUSCLE ACHES, THROAT IRRITATION, HEADACHE AND/OR SOME NAUSEA.    FOR 24 HOURS DO NOT:  Drive, operate machinery or run household appliances.  Drink beer or alcoholic beverages.   Make important decisions or sign legal documents.    SPECIAL INSTRUCTIONS:   POST ANGIOGRAM  General Care Instructions  • Maintain a bandage over the incision site for 24 hours.  It's normal to find a small bruise or dime-sized lump at the insertion site. This should disappear within a few weeks.  • Do not apply lotions or powders to the site.  Do not immerse the catheter insertion site in water (bathtub/swimming) for five days. It is ok to shower 24 hours after the procedure.  • You may resume your normal diet immediately; on the day of your procedure, drink 6-10 glasses of water to help flush the contrast liquid out of your system.  • If the doctor inserted the catheter in through your groin:  o Walking short distances on a flat surface is OK. Limit going up/down stairs for the first 2 days.  o DO NOT do yard work, drive, squat, lift heavy objects, or play sports for 2 days; or until your health care provider tells you it is OK.  • If the doctor inserted the catheter in your arm:  o For 5 days, DO NOT lift anything heavier than 10 pounds (approximately a gallon of milk). DO NOT do any heavy pushing, pulling, or twisting.    Medications  • If your current medications need to be changed, you will be provided with an updated list of your medications prior to discharge.  • If you take warfarin (Coumadin), resume taking your usual dose the evening after the procedure.  • DO NOT STOP taking prescribed blood thinning (anti-platelet)  medications unless instructed by your cardiologist.  These medications include:  o Aspirin, Clopidogrel (Plavix), Ticagrelor (Brilinta), or Prasugrel (Effient)   • If you take one of the following anticoagulants, RESUME 24 HOURS after your procedure:  o Apixiban (Eliquis), Rivaroxaban (Xarelto), Dabigatran (Pradaxa), Edoxaban (Savaysa)  • If you take metformin (Glucophage), RESUME 48 HOURS after your procedure.    When to call your healthcare provider  Call your cardiologist right away at [phone] if you have any of the following:  ?  Problems/Concerns taking any of your prescribed heart medicines.  ?  The insertion site has increasing pain, swelling, redness, bleeding, or drainage.  ?  Your arm or leg below where the insertion site changes color, is cool, or is numb.  ?  You have chest pain or shortness of breath that does not go away with rest.  ?  Your pulse feels irregular -- very slow (less than 60 beats/minute) or very fast (over 100 beats/minute).  ?  You have dizziness, fainting, or you are very tired.  ?  You are coughing up blood or yellow or green mucus.  ?  You have chills or a fever over 101°F (38.3°C).   ?  If there is bleeding at the catheter insertion site, apply pressure for 10 minutes.  If bleeding persists, call 911, and continue to hold pressure until advanced medical support arrives.         DIET: To avoid nausea, slowly advance diet as tolerated, avoiding spicy or greasy foods for the first day.  Add more substantial food to your diet according to your physician's instructions. INCREASE FLUIDS AND FIBER TO AVOID CONSTIPATION.    SURGICAL DRESSING/BATHING: leave dressing in place for 24 hours, may shower once removed    FOLLOW-UP APPOINTMENT:  A follow-up appointment should be arranged with your doctor; call to schedule.    You should CALL YOUR PHYSICIAN if you develop:  Fever greater than 101 degrees F.  Pain not relieved by medication, or persistent nausea or vomiting.  Excessive bleeding  (blood soaking through dressing) or unexpected drainage from the wound.  Extreme redness or swelling around the incision site, drainage of pus or foul smelling drainage.  Inability to urinate or empty your bladder within 8 hours.  Problems with breathing or chest pain.    You should call 911 if you develop problems with breathing or chest pain.  If you are unable to contact your doctor or surgical center, you should go to the nearest emergency room or urgent care center.  Physician's telephone #: [phone]    If any questions arise, call your doctor.  If your doctor is not available, please feel free to call the Surgical Center at [phone].  The Center is open Monday through Friday from 7AM to 7PM.  You can also call the HEALTH HOTLINE open 24 hours/day, 7 days/week and speak to a nurse at [phone], or toll free at [phone].    A registered nurse may call you a few days after your surgery to see how you are doing after your procedure.    MEDICATIONS: Resume taking daily medication.  Take prescribed pain medication with food.  If no medication is prescribed, you may take non-aspirin pain medication if needed.  PAIN MEDICATION CAN BE VERY CONSTIPATING.  Take a stool softener or laxative such as senokot, pericolace, or milk of magnesia if needed.    If your physician has prescribed pain medication that includes Acetaminophen (Tylenol), do not take additional Acetaminophen (Tylenol) while taking the prescribed medication.    Depression / Suicide Risk    As you are discharged from this [facility] facility, it is important to learn how to keep safe from harming yourself.    Recognize the warning signs:  · Abrupt changes in personality, positive or negative- including increase in energy   · Giving away possessions  · Change in eating patterns- significant weight changes-  positive or negative  · Change in sleeping patterns- unable to sleep or sleeping all the time   · Unwillingness or inability to  communicate  · Depression  · Unusual sadness, discouragement and loneliness  · Talk of wanting to die  · Neglect of personal appearance   · Rebelliousness- reckless behavior  · Withdrawal from people/activities they love  · Confusion- inability to concentrate     If you or a loved one observes any of these behaviors or has concerns about self-harm, here's what you can do:  · Talk about it- your feelings and reasons for harming yourself  · Remove any means that you might use to hurt yourself (examples: pills, rope, extension cords, firearm)  · Get professional help from the community (Mental Health, Substance Abuse, psychological counseling)  · Do not be alone:Call your Safe Contact- someone whom you trust who will be there for you.  · Call your local CRISIS HOTLINE 239-8979 or 009-590-4099  · Call your local Children's Mobile Crisis Response Team [place] (702) 419-4047 or www.Resonergy  · Call the toll free National Suicide Prevention Hotlines   · National Suicide Prevention Lifeline 901-408-ECSB (2634)  · National Hope Line Network 800-SUICIDE (691-6933)

## 2020-07-10 NOTE — PROCEDURES
Cardiac Catheterization report    7/10/2020  11:27 AM    Primary Care Provider: Denise Mcnulty M.D.    Indication for procedure: Severe valvular heart disease    Procedures performed:  ·  Coronary arteriograms     Final impression:  Angiographically normal appearing coronary arteries.    Recommendations:  Proceed with TAVR.    Findings:  1.  Left main coronary artery:  Normal.  2.  Left anterior descending artery:  Normal.   3.  Left circumflex coronary artery:  Normal.    4.  Right coronary artery:  Normal.  This is a right dominant system.      EBL: <10 CC    Specimens: None    Procedure details:  The risks and benefits of cardiac catheterization and possible intervention were explained to the patient including death, heart attack, stroke, and emergency surgery.  The patient verbalized understanding and wished to proceed.  The patient was brought to the cardiac catheterization laboratory in the fasting state and prepped and draped in the usual sterile fashion.  The right wrist was locally anesthetized with lidocaine and the right radial artery was cannulated with 5/6-Slovak equipment and standard radial cocktail was given.  Coronary angiography was performed using JR 4 and JL 3.5  diagnostic catheters in the usual fashion, results mentioned below.    Once all the views were obtained, all wires and catheters were removed from the patient without difficulty.  A Vasc-Band was placed over the right radial artery and the radial artery sheath was removed without difficulty.      Complications:  None    Sedation time:  I supervised moderate sedation over a trained independent nursing staff,  Sedation Start time:  11:06   Sedation Stop time: 11:20      GIRISH Ambrose  [facility]

## 2020-07-10 NOTE — OR NURSING
1138 Pt over from cath lab post cardiac angiogram. Right wrist site with TR band in place, site CDI and no signs of bleeding, pt awake and alert, denies pain and nausea, VSS  1150 Tolerating orals  1215 Pt resting comfortably no needs at this time.  1240 2mls of air removed from TR band, bleeding noted, air replaced  1310 Pt ambulated to restroom, tolerated well  1315 2mls of air removed from TR band, site CDI and no signs of bleeding  1330 2mls of air removed from TR band, site CDI and no signs of bleeding  1345 2mls of air removed from TR band, site CDI and no signs of bleeding  1400 2mls of air removed from TR band, site CDI and no signs of bleeding  1415 3 mls of air removed from TR band, site CDI and no signs of bleeding  1430 Final 3 mls of air removed from TR band, site CDI and no signs of bleeding. Gauze, tegaderm and coban placed. Sensation intact.  1435 Criteria met  1440 Pt wheeled off of unit, escorted by RN, with all belongings without incident. Discharge instructions provided to pt and spouse. Discussed diet, activity, follow up, medications and symptom management. Pt and  spouse state understanding. Pt and spouse state all questions have been answered. Copy of discharge provided to pt.

## 2020-07-16 ENCOUNTER — OFFICE VISIT (OUTPATIENT)
Dept: ADMISSIONS | Facility: MEDICAL CENTER | Age: 75
DRG: 267 | End: 2020-07-16
Attending: INTERNAL MEDICINE
Payer: MEDICARE

## 2020-07-16 ENCOUNTER — DOCUMENTATION (OUTPATIENT)
Dept: CARDIOLOGY | Facility: MEDICAL CENTER | Age: 75
End: 2020-07-16

## 2020-07-16 DIAGNOSIS — Z01.812 PRE-OPERATIVE LABORATORY EXAMINATION: ICD-10-CM

## 2020-07-16 LAB — COVID ORDER STATUS COVID19: NORMAL

## 2020-07-16 PROCEDURE — U0003 INFECTIOUS AGENT DETECTION BY NUCLEIC ACID (DNA OR RNA); SEVERE ACUTE RESPIRATORY SYNDROME CORONAVIRUS 2 (SARS-COV-2) (CORONAVIRUS DISEASE [COVID-19]), AMPLIFIED PROBE TECHNIQUE, MAKING USE OF HIGH THROUGHPUT TECHNOLOGIES AS DESCRIBED BY CMS-2020-01-R: HCPCS

## 2020-07-16 NOTE — PROGRESS NOTES
Valve Conference Heart Team Plan of Care: 7/15/20    Valve candidate: Yes  With Possible open heart: Yes  Access: right iliac   Valve Size: 26 S3 ultra   Cerebral Protection: n/a  Anesthesia: MAC  Unit: Tele  Incidental findings: none  Clearance needed prior to procedure: none      Plan of care: Proceed with TAVR as planned 7/20/20      Spoke with patient to update with heart team's recommendations. Reviewed pre-operative instructions including NPO midnight prior, and check in 0530. Patient reports her and  will be staying at [facility] until post op follow up. Patient states understanding of all information, agrees with the plan, and states no further questions at this time.

## 2020-07-17 LAB
SARS-COV-2 RNA RESP QL NAA+PROBE: NOTDETECTED
SPECIMEN SOURCE: NORMAL

## 2020-07-20 ENCOUNTER — HOSPITAL ENCOUNTER (INPATIENT)
Facility: MEDICAL CENTER | Age: 75
LOS: 1 days | DRG: 267 | End: 2020-07-21
Attending: INTERNAL MEDICINE | Admitting: INTERNAL MEDICINE
Payer: MEDICARE

## 2020-07-20 ENCOUNTER — APPOINTMENT (OUTPATIENT)
Dept: CARDIOLOGY | Facility: MEDICAL CENTER | Age: 75
DRG: 267 | End: 2020-07-20
Attending: ANESTHESIOLOGY
Payer: MEDICARE

## 2020-07-20 ENCOUNTER — ANESTHESIA (OUTPATIENT)
Dept: SURGERY | Facility: MEDICAL CENTER | Age: 75
DRG: 267 | End: 2020-07-20
Payer: MEDICARE

## 2020-07-20 ENCOUNTER — ANESTHESIA EVENT (OUTPATIENT)
Dept: SURGERY | Facility: MEDICAL CENTER | Age: 75
DRG: 267 | End: 2020-07-20
Payer: MEDICARE

## 2020-07-20 ENCOUNTER — APPOINTMENT (OUTPATIENT)
Dept: RADIOLOGY | Facility: MEDICAL CENTER | Age: 75
DRG: 267 | End: 2020-07-20
Attending: NURSE PRACTITIONER
Payer: MEDICARE

## 2020-07-20 DIAGNOSIS — Z95.2 S/P TAVR (TRANSCATHETER AORTIC VALVE REPLACEMENT): ICD-10-CM

## 2020-07-20 PROBLEM — C50.919 MALIGNANT NEOPLASM OF BREAST (FEMALE) (HCC): Status: RESOLVED | Noted: 2017-06-27 | Resolved: 2020-07-20

## 2020-07-20 LAB
ACT BLD: 136 SEC (ref 74–137)
ACT BLD: 351 SEC (ref 74–137)
ALBUMIN SERPL BCP-MCNC: 3.7 G/DL (ref 3.2–4.9)
ALBUMIN/GLOB SERPL: 1.6 G/DL
ALP SERPL-CCNC: 78 U/L (ref 30–99)
ALT SERPL-CCNC: 14 U/L (ref 2–50)
ANION GAP SERPL CALC-SCNC: 9 MMOL/L (ref 7–16)
AST SERPL-CCNC: 16 U/L (ref 12–45)
BILIRUB SERPL-MCNC: 0.4 MG/DL (ref 0.1–1.5)
BUN SERPL-MCNC: 15 MG/DL (ref 8–22)
CALCIUM SERPL-MCNC: 8.7 MG/DL (ref 8.5–10.5)
CHLORIDE SERPL-SCNC: 100 MMOL/L (ref 96–112)
CO2 SERPL-SCNC: 24 MMOL/L (ref 20–33)
CREAT SERPL-MCNC: 0.54 MG/DL (ref 0.5–1.4)
EKG IMPRESSION: NORMAL
ERYTHROCYTE [DISTWIDTH] IN BLOOD BY AUTOMATED COUNT: 43.6 FL (ref 35.9–50)
GLOBULIN SER CALC-MCNC: 2.3 G/DL (ref 1.9–3.5)
GLUCOSE SERPL-MCNC: 106 MG/DL (ref 65–99)
HCT VFR BLD AUTO: 38.3 % (ref 37–47)
HGB BLD-MCNC: 12.8 G/DL (ref 12–16)
MCH RBC QN AUTO: 29.7 PG (ref 27–33)
MCHC RBC AUTO-ENTMCNC: 33.4 G/DL (ref 33.6–35)
MCV RBC AUTO: 88.9 FL (ref 81.4–97.8)
NT-PROBNP SERPL IA-MCNC: 795 PG/ML (ref 0–125)
PLATELET # BLD AUTO: 194 K/UL (ref 164–446)
PMV BLD AUTO: 8.9 FL (ref 9–12.9)
POTASSIUM SERPL-SCNC: 3.5 MMOL/L (ref 3.6–5.5)
PROT SERPL-MCNC: 6 G/DL (ref 6–8.2)
RBC # BLD AUTO: 4.31 M/UL (ref 4.2–5.4)
SODIUM SERPL-SCNC: 133 MMOL/L (ref 135–145)
WBC # BLD AUTO: 2.5 K/UL (ref 4.8–10.8)

## 2020-07-20 PROCEDURE — 33361 REPLACE AORTIC VALVE PERQ: CPT | Mod: 62,Q0 | Performed by: THORACIC SURGERY (CARDIOTHORACIC VASCULAR SURGERY)

## 2020-07-20 PROCEDURE — 93010 ELECTROCARDIOGRAM REPORT: CPT | Performed by: INTERNAL MEDICINE

## 2020-07-20 PROCEDURE — 160048 HCHG OR STATISTICAL LEVEL 1-5: Performed by: INTERNAL MEDICINE

## 2020-07-20 PROCEDURE — 700111 HCHG RX REV CODE 636 W/ 250 OVERRIDE (IP)

## 2020-07-20 PROCEDURE — B24BZZ4 ULTRASONOGRAPHY OF HEART WITH AORTA, TRANSESOPHAGEAL: ICD-10-PCS | Performed by: ANESTHESIOLOGY

## 2020-07-20 PROCEDURE — C1883 ADAPT/EXT, PACING/NEURO LEAD: HCPCS | Performed by: INTERNAL MEDICINE

## 2020-07-20 PROCEDURE — C1769 GUIDE WIRE: HCPCS | Performed by: INTERNAL MEDICINE

## 2020-07-20 PROCEDURE — 160035 HCHG PACU - 1ST 60 MINS PHASE I: Performed by: INTERNAL MEDICINE

## 2020-07-20 PROCEDURE — 03HY32Z INSERTION OF MONITORING DEVICE INTO UPPER ARTERY, PERCUTANEOUS APPROACH: ICD-10-PCS | Performed by: ANESTHESIOLOGY

## 2020-07-20 PROCEDURE — 93355 ECHO TRANSESOPHAGEAL (TEE): CPT

## 2020-07-20 PROCEDURE — 503001 HCHG PERFUSION: Performed by: INTERNAL MEDICINE

## 2020-07-20 PROCEDURE — 700101 HCHG RX REV CODE 250: Performed by: ANESTHESIOLOGY

## 2020-07-20 PROCEDURE — 700102 HCHG RX REV CODE 250 W/ 637 OVERRIDE(OP): Performed by: NURSE PRACTITIONER

## 2020-07-20 PROCEDURE — 160042 HCHG SURGERY MINUTES - EA ADDL 1 MIN LEVEL 5: Performed by: INTERNAL MEDICINE

## 2020-07-20 PROCEDURE — C1894 INTRO/SHEATH, NON-LASER: HCPCS | Performed by: INTERNAL MEDICINE

## 2020-07-20 PROCEDURE — 71045 X-RAY EXAM CHEST 1 VIEW: CPT

## 2020-07-20 PROCEDURE — A6402 STERILE GAUZE <= 16 SQ IN: HCPCS | Performed by: INTERNAL MEDICINE

## 2020-07-20 PROCEDURE — 93005 ELECTROCARDIOGRAM TRACING: CPT | Performed by: NURSE PRACTITIONER

## 2020-07-20 PROCEDURE — 500002 HCHG ADHESIVE, DERMABOND: Performed by: INTERNAL MEDICINE

## 2020-07-20 PROCEDURE — 160009 HCHG ANES TIME/MIN: Performed by: INTERNAL MEDICINE

## 2020-07-20 PROCEDURE — 700111 HCHG RX REV CODE 636 W/ 250 OVERRIDE (IP): Performed by: ANESTHESIOLOGY

## 2020-07-20 PROCEDURE — 700111 HCHG RX REV CODE 636 W/ 250 OVERRIDE (IP): Performed by: INTERNAL MEDICINE

## 2020-07-20 PROCEDURE — A9270 NON-COVERED ITEM OR SERVICE: HCPCS | Performed by: NURSE PRACTITIONER

## 2020-07-20 PROCEDURE — 700117 HCHG RX CONTRAST REV CODE 255: Performed by: INTERNAL MEDICINE

## 2020-07-20 PROCEDURE — C1760 CLOSURE DEV, VASC: HCPCS | Performed by: INTERNAL MEDICINE

## 2020-07-20 PROCEDURE — 160002 HCHG RECOVERY MINUTES (STAT): Performed by: INTERNAL MEDICINE

## 2020-07-20 PROCEDURE — 33361 REPLACE AORTIC VALVE PERQ: CPT | Mod: 62,Q0 | Performed by: INTERNAL MEDICINE

## 2020-07-20 PROCEDURE — 83880 ASSAY OF NATRIURETIC PEPTIDE: CPT

## 2020-07-20 PROCEDURE — C1725 CATH, TRANSLUMIN NON-LASER: HCPCS | Performed by: INTERNAL MEDICINE

## 2020-07-20 PROCEDURE — 501745 HCHG WIRE, SURGICAL STEEL: Performed by: INTERNAL MEDICINE

## 2020-07-20 PROCEDURE — 85027 COMPLETE CBC AUTOMATED: CPT

## 2020-07-20 PROCEDURE — 85347 COAGULATION TIME ACTIVATED: CPT | Mod: 91

## 2020-07-20 PROCEDURE — 160036 HCHG PACU - EA ADDL 30 MINS PHASE I: Performed by: INTERNAL MEDICINE

## 2020-07-20 PROCEDURE — 80053 COMPREHEN METABOLIC PANEL: CPT

## 2020-07-20 PROCEDURE — 160031 HCHG SURGERY MINUTES - 1ST 30 MINS LEVEL 5: Performed by: INTERNAL MEDICINE

## 2020-07-20 PROCEDURE — 02RF38Z REPLACEMENT OF AORTIC VALVE WITH ZOOPLASTIC TISSUE, PERCUTANEOUS APPROACH: ICD-10-PCS | Performed by: INTERNAL MEDICINE

## 2020-07-20 PROCEDURE — 502240 HCHG MISC OR SUPPLY RC 0272: Performed by: INTERNAL MEDICINE

## 2020-07-20 PROCEDURE — 502000 HCHG MISC OR IMPLANTS RC 0278: Performed by: INTERNAL MEDICINE

## 2020-07-20 PROCEDURE — 770020 HCHG ROOM/CARE - TELE (206)

## 2020-07-20 PROCEDURE — 700105 HCHG RX REV CODE 258: Performed by: INTERNAL MEDICINE

## 2020-07-20 PROCEDURE — 5A09357 ASSISTANCE WITH RESPIRATORY VENTILATION, LESS THAN 24 CONSECUTIVE HOURS, CONTINUOUS POSITIVE AIRWAY PRESSURE: ICD-10-PCS | Performed by: INTERNAL MEDICINE

## 2020-07-20 PROCEDURE — 503000 HCHG SUTURE, OHS: Performed by: INTERNAL MEDICINE

## 2020-07-20 PROCEDURE — A9270 NON-COVERED ITEM OR SERVICE: HCPCS | Performed by: INTERNAL MEDICINE

## 2020-07-20 PROCEDURE — 700102 HCHG RX REV CODE 250 W/ 637 OVERRIDE(OP): Performed by: INTERNAL MEDICINE

## 2020-07-20 PROCEDURE — 700101 HCHG RX REV CODE 250: Performed by: INTERNAL MEDICINE

## 2020-07-20 PROCEDURE — 501838 HCHG SUTURE GENERAL: Performed by: INTERNAL MEDICINE

## 2020-07-20 DEVICE — IMPLANTABLE DEVICE: Type: IMPLANTABLE DEVICE | Status: FUNCTIONAL

## 2020-07-20 DEVICE — DEVICE CLSR 6FR HMST IMPL SLF STS PLUS ANGIOSEAL (10EA/CA): Type: IMPLANTABLE DEVICE | Status: FUNCTIONAL

## 2020-07-20 RX ORDER — ASPIRIN 81 MG/1
81 TABLET, CHEWABLE ORAL ONCE
Status: COMPLETED | OUTPATIENT
Start: 2020-07-20 | End: 2020-07-20

## 2020-07-20 RX ORDER — SODIUM CHLORIDE 9 MG/ML
INJECTION, SOLUTION INTRAVENOUS CONTINUOUS
Status: ACTIVE | OUTPATIENT
Start: 2020-07-20 | End: 2020-07-20

## 2020-07-20 RX ORDER — OXYCODONE HCL 5 MG/5 ML
10 SOLUTION, ORAL ORAL
Status: DISCONTINUED | OUTPATIENT
Start: 2020-07-20 | End: 2020-07-20 | Stop reason: HOSPADM

## 2020-07-20 RX ORDER — HYDROMORPHONE HYDROCHLORIDE 1 MG/ML
0.4 INJECTION, SOLUTION INTRAMUSCULAR; INTRAVENOUS; SUBCUTANEOUS
Status: DISCONTINUED | OUTPATIENT
Start: 2020-07-20 | End: 2020-07-20 | Stop reason: HOSPADM

## 2020-07-20 RX ORDER — ACETAMINOPHEN 325 MG/1
650 TABLET ORAL EVERY 6 HOURS PRN
Status: DISCONTINUED | OUTPATIENT
Start: 2020-07-20 | End: 2020-07-21 | Stop reason: HOSPADM

## 2020-07-20 RX ORDER — CLOPIDOGREL BISULFATE 75 MG/1
300 TABLET ORAL ONCE
Status: COMPLETED | OUTPATIENT
Start: 2020-07-20 | End: 2020-07-20

## 2020-07-20 RX ORDER — VENLAFAXINE 75 MG/1
75 TABLET ORAL 2 TIMES DAILY WITH MEALS
Status: DISCONTINUED | OUTPATIENT
Start: 2020-07-20 | End: 2020-07-21 | Stop reason: HOSPADM

## 2020-07-20 RX ORDER — ALPRAZOLAM 0.25 MG/1
0.5 TABLET ORAL
Status: DISCONTINUED | OUTPATIENT
Start: 2020-07-20 | End: 2020-07-21 | Stop reason: HOSPADM

## 2020-07-20 RX ORDER — SUCCINYLCHOLINE/SOD CL,ISO/PF 200MG/10ML
SYRINGE (ML) INTRAVENOUS PRN
Status: DISCONTINUED | OUTPATIENT
Start: 2020-07-20 | End: 2020-07-20 | Stop reason: SURG

## 2020-07-20 RX ORDER — DIPHENHYDRAMINE HYDROCHLORIDE 50 MG/ML
25 INJECTION INTRAMUSCULAR; INTRAVENOUS EVERY 6 HOURS PRN
Status: DISCONTINUED | OUTPATIENT
Start: 2020-07-20 | End: 2020-07-21 | Stop reason: HOSPADM

## 2020-07-20 RX ORDER — SODIUM CHLORIDE, SODIUM LACTATE, POTASSIUM CHLORIDE, CALCIUM CHLORIDE 600; 310; 30; 20 MG/100ML; MG/100ML; MG/100ML; MG/100ML
INJECTION, SOLUTION INTRAVENOUS CONTINUOUS
Status: DISCONTINUED | OUTPATIENT
Start: 2020-07-20 | End: 2020-07-20 | Stop reason: HOSPADM

## 2020-07-20 RX ORDER — HYDROMORPHONE HYDROCHLORIDE 1 MG/ML
0.2 INJECTION, SOLUTION INTRAMUSCULAR; INTRAVENOUS; SUBCUTANEOUS
Status: DISCONTINUED | OUTPATIENT
Start: 2020-07-20 | End: 2020-07-20 | Stop reason: HOSPADM

## 2020-07-20 RX ORDER — DIPHENHYDRAMINE HYDROCHLORIDE 50 MG/ML
12.5 INJECTION INTRAMUSCULAR; INTRAVENOUS
Status: DISCONTINUED | OUTPATIENT
Start: 2020-07-20 | End: 2020-07-20 | Stop reason: HOSPADM

## 2020-07-20 RX ORDER — AMOXICILLIN 250 MG
2 CAPSULE ORAL 2 TIMES DAILY
Status: DISCONTINUED | OUTPATIENT
Start: 2020-07-20 | End: 2020-07-21 | Stop reason: HOSPADM

## 2020-07-20 RX ORDER — LIDOCAINE HYDROCHLORIDE 40 MG/ML
SOLUTION TOPICAL PRN
Status: DISCONTINUED | OUTPATIENT
Start: 2020-07-20 | End: 2020-07-20 | Stop reason: SURG

## 2020-07-20 RX ORDER — ONDANSETRON 2 MG/ML
4 INJECTION INTRAMUSCULAR; INTRAVENOUS
Status: DISCONTINUED | OUTPATIENT
Start: 2020-07-20 | End: 2020-07-20 | Stop reason: HOSPADM

## 2020-07-20 RX ORDER — HEPARIN SODIUM 1000 [USP'U]/ML
INJECTION, SOLUTION INTRAVENOUS; SUBCUTANEOUS PRN
Status: DISCONTINUED | OUTPATIENT
Start: 2020-07-20 | End: 2020-07-20 | Stop reason: HOSPADM

## 2020-07-20 RX ORDER — HYDROMORPHONE HYDROCHLORIDE 1 MG/ML
0.1 INJECTION, SOLUTION INTRAMUSCULAR; INTRAVENOUS; SUBCUTANEOUS
Status: DISCONTINUED | OUTPATIENT
Start: 2020-07-20 | End: 2020-07-20 | Stop reason: HOSPADM

## 2020-07-20 RX ORDER — HYDRALAZINE HYDROCHLORIDE 20 MG/ML
10 INJECTION INTRAMUSCULAR; INTRAVENOUS
Status: DISCONTINUED | OUTPATIENT
Start: 2020-07-20 | End: 2020-07-21 | Stop reason: HOSPADM

## 2020-07-20 RX ORDER — PROTAMINE SULFATE 10 MG/ML
INJECTION, SOLUTION INTRAVENOUS PRN
Status: DISCONTINUED | OUTPATIENT
Start: 2020-07-20 | End: 2020-07-20 | Stop reason: SURG

## 2020-07-20 RX ORDER — SODIUM CHLORIDE, SODIUM LACTATE, POTASSIUM CHLORIDE, CALCIUM CHLORIDE 600; 310; 30; 20 MG/100ML; MG/100ML; MG/100ML; MG/100ML
INJECTION, SOLUTION INTRAVENOUS CONTINUOUS
Status: DISCONTINUED | OUTPATIENT
Start: 2020-07-20 | End: 2020-07-20

## 2020-07-20 RX ORDER — BUPIVACAINE HYDROCHLORIDE 2.5 MG/ML
INJECTION, SOLUTION EPIDURAL; INFILTRATION; INTRACAUDAL
Status: DISCONTINUED | OUTPATIENT
Start: 2020-07-20 | End: 2020-07-20 | Stop reason: HOSPADM

## 2020-07-20 RX ORDER — ROCURONIUM BROMIDE 10 MG/ML
INJECTION, SOLUTION INTRAVENOUS PRN
Status: DISCONTINUED | OUTPATIENT
Start: 2020-07-20 | End: 2020-07-20 | Stop reason: SURG

## 2020-07-20 RX ORDER — OXYCODONE HCL 5 MG/5 ML
5 SOLUTION, ORAL ORAL
Status: DISCONTINUED | OUTPATIENT
Start: 2020-07-20 | End: 2020-07-20 | Stop reason: HOSPADM

## 2020-07-20 RX ORDER — DIPHENHYDRAMINE HCL 25 MG
25 TABLET ORAL NIGHTLY PRN
Status: DISCONTINUED | OUTPATIENT
Start: 2020-07-20 | End: 2020-07-20

## 2020-07-20 RX ORDER — CEFAZOLIN SODIUM 1 G/3ML
INJECTION, POWDER, FOR SOLUTION INTRAMUSCULAR; INTRAVENOUS PRN
Status: DISCONTINUED | OUTPATIENT
Start: 2020-07-20 | End: 2020-07-20 | Stop reason: SURG

## 2020-07-20 RX ORDER — LIDOCAINE HYDROCHLORIDE 20 MG/ML
INJECTION, SOLUTION INFILTRATION; PERINEURAL
Status: DISCONTINUED | OUTPATIENT
Start: 2020-07-20 | End: 2020-07-20 | Stop reason: HOSPADM

## 2020-07-20 RX ORDER — MEPERIDINE HYDROCHLORIDE 25 MG/ML
6.25 INJECTION INTRAMUSCULAR; INTRAVENOUS; SUBCUTANEOUS
Status: DISCONTINUED | OUTPATIENT
Start: 2020-07-20 | End: 2020-07-20 | Stop reason: HOSPADM

## 2020-07-20 RX ORDER — POLYETHYLENE GLYCOL 3350 17 G/17G
1 POWDER, FOR SOLUTION ORAL
Status: DISCONTINUED | OUTPATIENT
Start: 2020-07-20 | End: 2020-07-21 | Stop reason: HOSPADM

## 2020-07-20 RX ORDER — HALOPERIDOL 5 MG/ML
1 INJECTION INTRAMUSCULAR
Status: DISCONTINUED | OUTPATIENT
Start: 2020-07-20 | End: 2020-07-20 | Stop reason: HOSPADM

## 2020-07-20 RX ORDER — CLOPIDOGREL BISULFATE 75 MG/1
75 TABLET ORAL DAILY
Status: DISCONTINUED | OUTPATIENT
Start: 2020-07-21 | End: 2020-07-21 | Stop reason: HOSPADM

## 2020-07-20 RX ORDER — BISACODYL 10 MG
10 SUPPOSITORY, RECTAL RECTAL
Status: DISCONTINUED | OUTPATIENT
Start: 2020-07-20 | End: 2020-07-21 | Stop reason: HOSPADM

## 2020-07-20 RX ORDER — DESVENLAFAXINE 100 MG/1
1 TABLET, EXTENDED RELEASE ORAL EVERY MORNING
Status: DISCONTINUED | OUTPATIENT
Start: 2020-07-20 | End: 2020-07-20

## 2020-07-20 RX ORDER — ONDANSETRON 2 MG/ML
4 INJECTION INTRAMUSCULAR; INTRAVENOUS EVERY 4 HOURS PRN
Status: DISCONTINUED | OUTPATIENT
Start: 2020-07-20 | End: 2020-07-21 | Stop reason: HOSPADM

## 2020-07-20 RX ORDER — ARIPIPRAZOLE 10 MG/1
5 TABLET ORAL EVERY MORNING
Status: DISCONTINUED | OUTPATIENT
Start: 2020-07-20 | End: 2020-07-21 | Stop reason: HOSPADM

## 2020-07-20 RX ORDER — HYDRALAZINE HYDROCHLORIDE 20 MG/ML
INJECTION INTRAMUSCULAR; INTRAVENOUS
Status: COMPLETED
Start: 2020-07-20 | End: 2020-07-20

## 2020-07-20 RX ADMIN — HEPARIN SODIUM 10000 UNITS: 1000 INJECTION, SOLUTION INTRAVENOUS; SUBCUTANEOUS at 10:22

## 2020-07-20 RX ADMIN — ALPRAZOLAM 0.5 MG: 0.25 TABLET ORAL at 20:35

## 2020-07-20 RX ADMIN — POVIDONE-IODINE 15 ML: 10 SOLUTION TOPICAL at 08:26

## 2020-07-20 RX ADMIN — ROCURONIUM BROMIDE 20 MG: 10 INJECTION, SOLUTION INTRAVENOUS at 09:58

## 2020-07-20 RX ADMIN — PROTAMINE SULFATE 50 MG: 10 INJECTION, SOLUTION INTRAVENOUS at 10:42

## 2020-07-20 RX ADMIN — SODIUM CHLORIDE, POTASSIUM CHLORIDE, SODIUM LACTATE AND CALCIUM CHLORIDE: 600; 310; 30; 20 INJECTION, SOLUTION INTRAVENOUS at 08:26

## 2020-07-20 RX ADMIN — HYDRALAZINE HYDROCHLORIDE 10 MG: 20 INJECTION INTRAMUSCULAR; INTRAVENOUS at 12:01

## 2020-07-20 RX ADMIN — VENLAFAXINE 75 MG: 75 TABLET ORAL at 17:57

## 2020-07-20 RX ADMIN — Medication 80 MG: at 09:50

## 2020-07-20 RX ADMIN — LIDOCAINE HYDROCHLORIDE 0.5 ML: 10 INJECTION, SOLUTION EPIDURAL; INFILTRATION; INTRACAUDAL at 08:26

## 2020-07-20 RX ADMIN — DOXYLAMINE SUCCINATE 25 MG: 25 TABLET ORAL at 20:35

## 2020-07-20 RX ADMIN — CEFAZOLIN 2 G: 330 INJECTION, POWDER, FOR SOLUTION INTRAMUSCULAR; INTRAVENOUS at 10:08

## 2020-07-20 RX ADMIN — ACETAMINOPHEN 650 MG: 325 TABLET, FILM COATED ORAL at 23:28

## 2020-07-20 RX ADMIN — PROPOFOL 120 MG: 10 INJECTION, EMULSION INTRAVENOUS at 09:50

## 2020-07-20 RX ADMIN — ASPIRIN 81 MG: 81 TABLET, CHEWABLE ORAL at 17:53

## 2020-07-20 RX ADMIN — CLOPIDOGREL BISULFATE 300 MG: 75 TABLET ORAL at 20:35

## 2020-07-20 RX ADMIN — LIDOCAINE HYDROCHLORIDE 4 ML: 40 SOLUTION TOPICAL at 09:52

## 2020-07-20 RX ADMIN — ROCURONIUM BROMIDE 10 MG: 10 INJECTION, SOLUTION INTRAVENOUS at 09:50

## 2020-07-20 RX ADMIN — ACETAMINOPHEN 650 MG: 325 TABLET, FILM COATED ORAL at 13:53

## 2020-07-20 ASSESSMENT — LIFESTYLE VARIABLES
HOW MANY TIMES IN THE PAST YEAR HAVE YOU HAD 5 OR MORE DRINKS IN A DAY: 0
TOTAL SCORE: 0
DOES PATIENT WANT TO STOP DRINKING: NO
HAVE PEOPLE ANNOYED YOU BY CRITICIZING YOUR DRINKING: NO
CONSUMPTION TOTAL: NEGATIVE
EVER FELT BAD OR GUILTY ABOUT YOUR DRINKING: NO
EVER HAD A DRINK FIRST THING IN THE MORNING TO STEADY YOUR NERVES TO GET RID OF A HANGOVER: NO
AVERAGE NUMBER OF DAYS PER WEEK YOU HAVE A DRINK CONTAINING ALCOHOL: 1
TOTAL SCORE: 0
ON A TYPICAL DAY WHEN YOU DRINK ALCOHOL HOW MANY DRINKS DO YOU HAVE: 1
TOTAL SCORE: 0
EVER_SMOKED: NEVER
HAVE YOU EVER FELT YOU SHOULD CUT DOWN ON YOUR DRINKING: NO
ALCOHOL_USE: YES

## 2020-07-20 ASSESSMENT — COGNITIVE AND FUNCTIONAL STATUS - GENERAL
SUGGESTED CMS G CODE MODIFIER DAILY ACTIVITY: CH
SUGGESTED CMS G CODE MODIFIER MOBILITY: CH
DAILY ACTIVITIY SCORE: 24
MOBILITY SCORE: 24

## 2020-07-20 ASSESSMENT — PATIENT HEALTH QUESTIONNAIRE - PHQ9
SUM OF ALL RESPONSES TO PHQ9 QUESTIONS 1 AND 2: 0
1. LITTLE INTEREST OR PLEASURE IN DOING THINGS: NOT AT ALL
2. FEELING DOWN, DEPRESSED, IRRITABLE, OR HOPELESS: NOT AT ALL

## 2020-07-20 ASSESSMENT — FIBROSIS 4 INDEX
FIB4 SCORE: 1.65
FIB4 SCORE: 1.257788237343631704

## 2020-07-20 ASSESSMENT — PAIN SCALES - GENERAL: PAIN_LEVEL: 0

## 2020-07-20 NOTE — OR SURGEON
Immediate Post OP Note    PreOp Diagnosis: Severe symptomatic aortic stenosis    PostOp Diagnosis: Same    Procedure(s):  TAVR (23 mm S3 ultra Lomeli pericardial valve, +1 mL in balloon)  NEHA    Surgeon(s):  SONJA Borja M.D.    Anesthesiologist/Type of Anesthesia:  Anesthesiologist: Simon Gonzalez M.D.  Anesthesia Technician: Na Hunter/General    Surgical Staff:  Circulator: Bessy Hui R.N.  Perfusionist: Christina Jackson  Scrub Person: Peter Lott    Specimens removed if any: None    Estimated Blood Loss: Minimal    Findings: Severe aortic stenosis    Complications: None        7/20/2020 10:58 AM Boo Nelson M.D.

## 2020-07-20 NOTE — PROGRESS NOTES
Patient transferred up to the unit. Patient oriented to the unit and the use of call light. Patient sited inspected, clean dry intact, and soft to palpation.

## 2020-07-20 NOTE — OR NURSING
1100: Pt arrives to PACU asleep and calm with oral airway in. Right radial artline. Right groin with dermabond- CDI. Left groin 2x2 and tegaderm- CDI. Labs sent. Xray and EKG called.    1115: Oral airway DC. Redet at bedside groin sites assessed. Pt awake and denies pain or nausea.    1120: Chest x ray at bedside.    1135: EKG completed.    1145: Pt unable to void, bladder scan shows 514 cc. Pt was straight cathd per standing orders. Urine output 500 cc.    1200: BP sustaining over 140 after pt was straight cathd. PRN hydralazine given.    1235: Redet and  at bedside, groin site assessed. Okay to not give second dose of hydralazine at this time for SBP of 153. Notified of critical  WBC: 2.5- no new orders received.    1242: Report given to Zainab ROWLEY.

## 2020-07-20 NOTE — ANESTHESIA POSTPROCEDURE EVALUATION
Patient: Tiffany Louis    Procedure Summary     Date:  07/20/20 Room / Location:  [facility] OR 19 / SURGERY [facility]    Anesthesia Start:  0943 Anesthesia Stop:  1101    Procedures:       REPLACEMENT, AORTIC VALVE, TRANSCATHETER (Groin)      ECHOCARDIOGRAM, TRANSESOPHAGEAL- (Mouth) Diagnosis:  (SEVERE AORTIC STENOSIS)    Surgeon:  Mukesh Reid M.D. Responsible Provider:  Simon Gonzalez M.D.    Anesthesia Type:  general ASA Status:  4          Final Anesthesia Type: general  Last vitals  BP   Blood Pressure : 145/73, Arterial BP: (!) 3/1    Temp   36 °C (96.8 °F)    Pulse   Pulse: 71   Resp   16    SpO2   97 %      Anesthesia Post Evaluation    Patient location during evaluation: PACU  Patient participation: complete - patient participated  Level of consciousness: awake and alert  Pain score: 0    Airway patency: patent  Anesthetic complications: no  Cardiovascular status: hemodynamically stable  Respiratory status: acceptable  Hydration status: euvolemic    PONV: none           Nurse Pain Score: 0 (NPRS)

## 2020-07-20 NOTE — ANESTHESIA PROCEDURE NOTES
Arterial Line  Performed by: Simon Gonzalez M.D.  Authorized by: Simon Gonzalez M.D.     Start Time:  7/20/2020 9:47 AM  End Time:  7/20/2020 9:48 AM  Localization: surface landmarks    Patient Location:  OR  Indication: continuous blood pressure monitoring        Catheter Size:  20 G  Seldinger Technique?: Yes    Laterality:  Right  Site:  Radial artery  Line Secured:  Antimicrobial disc, tape and transparent dressing  Events: patient tolerated procedure well with no complications

## 2020-07-20 NOTE — ANESTHESIA TIME REPORT
Anesthesia Start and Stop Event Times     Date Time Event    7/20/2020 0928 Ready for Procedure     0943 Anesthesia Start     1101 Anesthesia Stop        Responsible Staff  07/20/20    Name Role Begin End    Simon Gonzalez M.D. Anesth 0943 1101        Preop Diagnosis (Free Text):  Pre-op Diagnosis     SEVERE AORTIC STENOSIS        Preop Diagnosis (Codes):    Post op Diagnosis  Severe aortic stenosis      Premium Reason  Non-Premium    Comments: art line, karen

## 2020-07-20 NOTE — PROGRESS NOTES
Bladder scan shows 711ml. Pt still states she is unable to void. Marin catheter placed per standing orders. MD made aware.

## 2020-07-20 NOTE — OP REPORT
DATE OF SERVICE:  07/20/2020    REFERRING PHYSICIAN:  Evelio Alfaro MD    PREOPERATIVE DIAGNOSIS:  Severe symptomatic aortic stenosis.    POSTOPERATIVE DIAGNOSIS:  Severe symptomatic aortic stenosis.    PROCEDURES:  Transcatheter aortic valve replacement (23 mm S3 Ultra Lomeli   pericardial valve, +1 mL in balloon) and intraoperative transesophageal   echocardiography.    SURGEONS:  Boo Nelson MD and Mukesh Reid MD    ANESTHESIOLOGIST:  Simon Gonzalez MD    ANESTHESIA:  General endotracheal.    ESTIMATED BLOOD LOSS:  Minimal.    COMPLICATIONS:  None.    INDICATIONS:  The patient is a 75-year-old female with severe symptomatic   aortic stenosis.    DESCRIPTION OF PROCEDURE:  The  patient was brought to the operating room and   placed on the operating room table in the supine position.  After successful   induction of general anesthesia and endotracheal intubation, the patient was   prepped and draped in the usual sterile fashion.  In collaboration with Dr. Reid, bilateral femoral ultrasound-guided arteriovenous access was   obtained.  Dr. Reid positioned a temporary transvenous pacemaker in the   right ventricle and a pigtail catheter in the right coronary sinus.  The   deployment angle was determined.  A 1 cm incision was made in the right groin   and 2 Perclose sutures were deployed.  A 14-Malawian eSheath was then placed in   the right common femoral artery and its tip was positioned in the abdominal   aorta.  The aortic valve was crossed with a wire.  A deployment catheter with   a 23 mm S3 Ultra Lomeli pericardial valve at its tip was positioned across   the aortic annulus.  One additional mL of volume was added to the balloon.    Dr. Reid inflated the balloon during valve implantation.  The peak   pressure reached 7.5 atmospheres.  Wires and catheters were removed.    Intraoperative transesophageal echocardiography showed a trivial paravalvular   leak.  The right  Noted ; [facility] femoral eSheath was removed and the Perclose sutures were   tightened down.  A small amount of Dermabond was applied over the right groin   incision.  The remainder of the operation will be dictated by Dr. Reid.    There were no apparent complications.  The patient tolerated the procedure   well and left the operating room in stable condition.       ____________________________________     Boo CARLOS / EUGENE    DD:  07/20/2020 11:05:13  DT:  07/20/2020 12:34:07    D#:  [id number]  Job#:  [id number]

## 2020-07-20 NOTE — ANESTHESIA PROCEDURE NOTES
Airway    Date/Time: 7/20/2020 9:52 AM  Performed by: Simon Gonzalez M.D.  Authorized by: Simon Gonzalez M.D.     Location:  OR  Urgency:  Elective  Difficult Airway: No    Indications for Airway Management:  Anesthesia      Spontaneous Ventilation: absent    Sedation Level:  Deep  Preoxygenated: Yes    Patient Position:  Sniffing  Mask Difficulty Assessment:  0 - not attempted  Final Airway Type:  Endotracheal airway  Final Endotracheal Airway:  ETT  Cuffed: Yes    Technique Used for Successful ETT Placement:  Direct laryngoscopy  Devices/Methods Used in Placement:  Cricoid pressure    Insertion Site:  Oral  Blade Type:  Mat  Laryngoscope Blade/Videolaryngoscope Blade Size:  3  ETT Size (mm):  6.5  Measured from:  Teeth  ETT to Teeth (cm):  20  Placement Verified by: auscultation and capnometry    Cormack-Lehane Classification:  Grade I - full view of glottis  Number of Attempts at Approach:  1

## 2020-07-20 NOTE — ANESTHESIA PREPROCEDURE EVALUATION
"    Relevant Problems   ANESTHESIA   (+) [place] (obstructive sleep apnea)      NEURO   (+) Personal history of malignant neoplasm of breast      CARDIAC   (+) Bilateral carotid artery stenosis   (+) Moderate aortic stenosis     BP (!) 181/63 Comment: Rn notified   Pulse 61   Temp 36.5 °C (97.7 °F) (Temporal)   Resp 17   Ht 1.626 m (5' 4\")   Wt 69.5 kg (153 lb 3.5 oz)   SpO2 97%   BMI 26.30 kg/m²     Physical Exam    Airway   Mallampati: II  TM distance: >3 FB  Neck ROM: full       Cardiovascular - normal exam  Rhythm: regular  Rate: normal  (-) murmur     Dental - normal exam           Pulmonary - normal exam  Breath sounds clear to auscultation     Abdominal    Neurological - normal exam                 Anesthesia Plan    ASA 4   ASA physical status 4 criteria: severe valve dysfunction    Plan - general       Airway plan will be ETT        Induction: intravenous    Postoperative Plan: Postoperative administration of opioids is intended.    Pertinent diagnostic labs and testing reviewed    Informed Consent:    Anesthetic plan and risks discussed with patient.    Use of blood products discussed with: patient whom consented to blood products.         "

## 2020-07-20 NOTE — ANESTHESIA QCDR
2019 [facility] Clinical Data Registry (for Quality Improvement)     Postoperative nausea/vomiting risk protocol (Adult = 18 yrs and Pediatric 3-17 yrs)- (430 and 463)  General inhalation anesthetic (NOT TIVA) with PONV risk factors: No  Provision of anti-emetic therapy with at least 2 different classes of agents: N/A  Patient DID NOT receive anti-emetic therapy and reason is documented in Medical Record: N/A    Multimodal Pain Management- (477)  Non-emergent surgery AND patient age >= 18: No  Use of Multimodal Pain Management, two or more drugs and/or interventions, NOT including systemic opioids:   Exception: Documented allergy to multiple classes of analgesics:     Smoking Abstinence (404)  Patient is current smoker (cigarette, pipe, e-cig, marijuanna): No  Elective Surgery:   Abstinence instructions provided prior to day of surgery:   Patient abstained from smoking on day of surgery:     Pre-Op Beta-Blocker in Isolated CABG (44)  Isolated CABG AND patient age >= 18: No  Beta-blocker admin within 24 hours of surgical incision:   Exception:of medical reason(s) for not administering beta blocker within 24 hours prior to surgical incision (e.g., not  indicated,other medical reason):     PACU assessment of acute postoperative pain prior to Anesthesia Care End- Applies to Patients Age = 18- (ABG7)  Initial PACU pain score is which of the following: < 7/10  Patient unable to report pain score: N/A    Post-anesthetic transfer of care checklist/protocol to PACU/ICU- (426 and 427)  Upon conclusion of case, patient transferred to which of the following locations: PACU/Non-ICU  Use of transfer checklist/protocol: Yes  Exclusion: Service Performed in Patient Hospital Room (and thus did not require transfer): N/A  Unplanned admission to ICU related to anesthesia service up through end of PACU care- (MD51)  Unplanned admission to ICU (not initially anticipated at anesthesia start time): No

## 2020-07-20 NOTE — OP REPORT
DATE OF PROCEDURE: 7/20/20    REFERRING PHYSICIAN:     PROCEDURES performed:  1. Transcatheter aortic valve replacement.  2. Insertion and removal of temporary pacer wire    PRE PROCEDURAL DIAGNOSIS:  1. Severe symptomatic aortic stenosis, NYHA II.    Operators:  CT Surgeon: Dr.Romanaus  Interventional cardiologist: Dr. Reid    DESCRIPTION OF PROCEDURE:  After informed consent was signed by the   patient, the patient was brought into the operating room, was prepped and draped in   usual sterile manner.    General anesthesia and   Transesophagealechocardiogram was provided by  (NEHA was performed due to discrepancy in annulus measurement).    Primary Arterial access:   right femoral artery was cannulated using modified Seldinger technique under ultrasound guidance, a 6 Filipino sheath was inserted.  Abdominal aortogram with bilateral iliofemoral runoff was performed by placing pigtail in descending abdominal aorta and confirmed common femoral arterial access. 2 Perclose sutures were placed, arteriotomy was serially dilated,14Fr sheath was inserted in the femoral artery over a stiff Lunderquist wire.    Secondary arterial access:   left femoral artery was cannulated with 6 Filipino sheath.  A pigtail catheter was advanced through 6 Filipino sheath, aortic root angiogram was performed to determine coplanar view.    Venous Access:   left femoral vein was cannulated with 6 Filipino sheath, a temporary pacer wire was advanced to RV apex in usual fashion.    Through Lomeli self-expandable sheath, AL-1 catheter was advanced to aortic root, straight wire was used to cross the aortic valve, a stiff Amplatz wire was placed in the LV apex and AL-1 catheter was withdrawn. 23  mm Lomeli Deonna 3 emma was prepped with one additional CC of volume, orientation was confirmed.  Sapient 3 valve was slowly advanced over a stiff Amplatz wire to aortic position.  Rapid pacing was achieved using temporary pacer wire, aortic  root angiogram was performed and after confirming good positioning, valve was slowly deployed under fluoroscopic guidance.  Post procedure echocardiogram showed no significant paravalvular leak, good valve positioning.    The patient tolerated the procedure well. At the end of procedure, all pacemaker wire,   pigtail catheters and sheaths were removed.  The primary arterial access site was closed with the PreClose system.  The secondary arterial access was closed via Angio-Seal. The patient was then extubated and transferred to PACU in stable condition.    Complications: none  Specimens: none  Estimated blood loss: <50cc      IMPRESSION:  Successful transcatheter aortic valve replacement using 23 mm Lomeli dat 3 Ultra valve with one additional cc of volume.    RECOMMENDATION:   Guideline directed medical therapy.    Mukesh Reid  Interventional cardiologist

## 2020-07-21 ENCOUNTER — APPOINTMENT (OUTPATIENT)
Dept: CARDIOLOGY | Facility: MEDICAL CENTER | Age: 75
DRG: 267 | End: 2020-07-21
Attending: NURSE PRACTITIONER
Payer: MEDICARE

## 2020-07-21 ENCOUNTER — APPOINTMENT (OUTPATIENT)
Dept: RADIOLOGY | Facility: MEDICAL CENTER | Age: 75
DRG: 267 | End: 2020-07-21
Attending: NURSE PRACTITIONER
Payer: MEDICARE

## 2020-07-21 VITALS
SYSTOLIC BLOOD PRESSURE: 145 MMHG | TEMPERATURE: 98.2 F | DIASTOLIC BLOOD PRESSURE: 73 MMHG | HEIGHT: 64 IN | WEIGHT: 152.56 LBS | BODY MASS INDEX: 26.05 KG/M2 | RESPIRATION RATE: 18 BRPM | HEART RATE: 82 BPM | OXYGEN SATURATION: 98 %

## 2020-07-21 LAB
ALBUMIN SERPL BCP-MCNC: 3.9 G/DL (ref 3.2–4.9)
ALBUMIN/GLOB SERPL: 1.6 G/DL
ALP SERPL-CCNC: 84 U/L (ref 30–99)
ALT SERPL-CCNC: 15 U/L (ref 2–50)
ANION GAP SERPL CALC-SCNC: 12 MMOL/L (ref 7–16)
AST SERPL-CCNC: 17 U/L (ref 12–45)
BILIRUB SERPL-MCNC: 0.5 MG/DL (ref 0.1–1.5)
BUN SERPL-MCNC: 13 MG/DL (ref 8–22)
CALCIUM SERPL-MCNC: 9.1 MG/DL (ref 8.5–10.5)
CHLORIDE SERPL-SCNC: 99 MMOL/L (ref 96–112)
CO2 SERPL-SCNC: 25 MMOL/L (ref 20–33)
CREAT SERPL-MCNC: 0.66 MG/DL (ref 0.5–1.4)
EKG IMPRESSION: NORMAL
ERYTHROCYTE [DISTWIDTH] IN BLOOD BY AUTOMATED COUNT: 43.5 FL (ref 35.9–50)
GLOBULIN SER CALC-MCNC: 2.5 G/DL (ref 1.9–3.5)
GLUCOSE SERPL-MCNC: 114 MG/DL (ref 65–99)
HCT VFR BLD AUTO: 39.3 % (ref 37–47)
HGB BLD-MCNC: 13.3 G/DL (ref 12–16)
LV EJECT FRACT  99904: 55
LV EJECT FRACT  99904: 65
LV EJECT FRACT MOD 2C 99903: 63.77
LV EJECT FRACT MOD 4C 99902: 68.39
LV EJECT FRACT MOD BP 99901: 65.82
MCH RBC QN AUTO: 29.8 PG (ref 27–33)
MCHC RBC AUTO-ENTMCNC: 33.8 G/DL (ref 33.6–35)
MCV RBC AUTO: 87.9 FL (ref 81.4–97.8)
NT-PROBNP SERPL IA-MCNC: 659 PG/ML (ref 0–125)
PLATELET # BLD AUTO: 185 K/UL (ref 164–446)
PMV BLD AUTO: 8.5 FL (ref 9–12.9)
POTASSIUM SERPL-SCNC: 3.8 MMOL/L (ref 3.6–5.5)
PROT SERPL-MCNC: 6.4 G/DL (ref 6–8.2)
RBC # BLD AUTO: 4.47 M/UL (ref 4.2–5.4)
SODIUM SERPL-SCNC: 136 MMOL/L (ref 135–145)
WBC # BLD AUTO: 4.8 K/UL (ref 4.8–10.8)

## 2020-07-21 PROCEDURE — 700102 HCHG RX REV CODE 250 W/ 637 OVERRIDE(OP): Performed by: INTERNAL MEDICINE

## 2020-07-21 PROCEDURE — 85027 COMPLETE CBC AUTOMATED: CPT

## 2020-07-21 PROCEDURE — 83880 ASSAY OF NATRIURETIC PEPTIDE: CPT

## 2020-07-21 PROCEDURE — 80053 COMPREHEN METABOLIC PANEL: CPT

## 2020-07-21 PROCEDURE — A9270 NON-COVERED ITEM OR SERVICE: HCPCS | Performed by: NURSE PRACTITIONER

## 2020-07-21 PROCEDURE — 36415 COLL VENOUS BLD VENIPUNCTURE: CPT

## 2020-07-21 PROCEDURE — 93005 ELECTROCARDIOGRAM TRACING: CPT | Performed by: NURSE PRACTITIONER

## 2020-07-21 PROCEDURE — 93010 ELECTROCARDIOGRAM REPORT: CPT | Performed by: INTERNAL MEDICINE

## 2020-07-21 PROCEDURE — 700111 HCHG RX REV CODE 636 W/ 250 OVERRIDE (IP): Performed by: NURSE PRACTITIONER

## 2020-07-21 PROCEDURE — 93306 TTE W/DOPPLER COMPLETE: CPT

## 2020-07-21 PROCEDURE — 71045 X-RAY EXAM CHEST 1 VIEW: CPT

## 2020-07-21 PROCEDURE — 700102 HCHG RX REV CODE 250 W/ 637 OVERRIDE(OP): Performed by: NURSE PRACTITIONER

## 2020-07-21 PROCEDURE — A9270 NON-COVERED ITEM OR SERVICE: HCPCS | Performed by: INTERNAL MEDICINE

## 2020-07-21 PROCEDURE — 93306 TTE W/DOPPLER COMPLETE: CPT | Mod: 26 | Performed by: INTERNAL MEDICINE

## 2020-07-21 RX ORDER — CLOPIDOGREL BISULFATE 75 MG/1
75 TABLET ORAL DAILY
Qty: 90 TAB | Refills: 0 | Status: SHIPPED | OUTPATIENT
Start: 2020-07-22 | End: 2020-07-21

## 2020-07-21 RX ORDER — CLOPIDOGREL BISULFATE 75 MG/1
75 TABLET ORAL DAILY
Qty: 90 TAB | Refills: 0 | Status: SHIPPED | OUTPATIENT
Start: 2020-07-22 | End: 2020-09-23

## 2020-07-21 RX ADMIN — VENLAFAXINE 75 MG: 75 TABLET ORAL at 08:35

## 2020-07-21 RX ADMIN — ARIPIPRAZOLE 5 MG: 10 TABLET ORAL at 05:04

## 2020-07-21 RX ADMIN — HYDRALAZINE HYDROCHLORIDE 10 MG: 20 INJECTION INTRAMUSCULAR; INTRAVENOUS at 10:06

## 2020-07-21 RX ADMIN — CLOPIDOGREL BISULFATE 75 MG: 75 TABLET ORAL at 05:05

## 2020-07-21 RX ADMIN — ACETAMINOPHEN 650 MG: 325 TABLET, FILM COATED ORAL at 11:31

## 2020-07-21 ASSESSMENT — PATIENT HEALTH QUESTIONNAIRE - PHQ9
1. LITTLE INTEREST OR PLEASURE IN DOING THINGS: NOT AT ALL
SUM OF ALL RESPONSES TO PHQ9 QUESTIONS 1 AND 2: 0
2. FEELING DOWN, DEPRESSED, IRRITABLE, OR HOPELESS: NOT AT ALL

## 2020-07-21 NOTE — PROGRESS NOTES
Pt given discharge instructions.  Discussed diet, activity, follow up appointments, symptoms and management, and prescriptions provided.  Packet sent with patient.  IV d/c'd, tele box off, and all questions answered.  Transport called for wheelchair.

## 2020-07-21 NOTE — PROGRESS NOTES
Valve Program Functional Assessment: pre TAVR     KCCQ12   1a) Showering/bathin  1b) Walking 1 block on ground: 4  1c) Hurrying or joggin  2) Swellin  3) Fatigue: 1  4) Shortness of breath: 1  5) Sleep sitting up: 5  6) Limited enjoyment of life: 4  7) Spend the rest of your life with HF: 1  8a) Hobbies, recreational activities:2  8b) Working or doing household chores:2  8c) Visiting family or friends: 3    5 meter walk test  1) __4.04____ s/5m  2) __4.57____ s/5m  3) __6.01____ s/5m     Strength   1) _12_____ kg  2) _10_____ kg  3) _8_____ kg    SAVAGE ADLs  Patient independently preforms...   - Bathing: Yes   - Dressing: Yes   - Toileting: Yes   - Transferring: Yes   - Continence: Yes   - Feeding: Yes     Living Situation  Patient lives: with spouse    Mobility Aids   Patient uses:  none

## 2020-07-21 NOTE — DISCHARGE INSTRUCTIONS
Discharge Instructions    Discharged to home by car with relative. Discharged via wheelchair, hospital escort: Yes.  Special equipment needed: Not Applicable    Be sure to schedule a follow-up appointment with your primary care doctor or any specialists as instructed.     Discharge Plan:   Diet Plan: Discussed  Activity Level: Discussed  Confirmed Follow up Appointment: Appointment Scheduled  Confirmed Symptoms Management: Discussed  Medication Reconciliation Updated: No (Comments)    I understand that a diet low in cholesterol, fat, and sodium is recommended for good health. Unless I have been given specific instructions below for another diet, I accept this instruction as my diet prescription.   Other diet: Cardiac/heart healthy    Special Instructions:   Transcatheter Aortic Valve Replacement (TAVR)    General Instructions:  1. Take Medication as directed.  You will likely need to take aspirin and another blood thinner (antiplatelet medication) for a period.  You'll need to take the aspirin for the rest of your life.  Be sure your doctor knows about all other medicines you take, including over-the-counter medicines and dietary supplements.    Incision Care Instructions:  1. Look and feel the site(s) of your TAVR TODAY so you can recognize changes that should be called to your doctor (see below).  2. It's normal for your incision to be bruised, itchy, or sore while it's healing.  Your incision may take a week or more to heal.  3. Bruising may occur.  Some of the discoloration may travel down the leg.  As it resolves, the color should change from blue to green to yellow-brown.  4. A small, round lump under the TAVR site may remain for up to 6 weeks.  5. Wash your incision site every day with warm water and soap.  Gently pat it dry.  Don't put powder, lotion, or ointment on the incision until it's healed.    Activity:  1. No driving for one week  2. If you must take a long car ride (not as a ), stop every hour  and walk around the car.  3. No lifting or pulling objects over 5 pounds for 4-5 days.  4. Warm showers or baths are permitted after the bandage is removed.  5. Walk regularly.  One of the best ways to get stronger is to walk.  Walk a little more each day.  Take someone with you until you feel OK to walk alone.    When to call your health care provider:  1. You develop a fever.  2. Redness, swelling, bleeding, warmth, or fluid draining at the incision site.  3. Bruising appears to be new or does not look like it is getting better.  4. The small, round lump in the groin in the area of the TAVR site increases in size.    Seek immediate medical help if you have any of the following symptoms:  1. You experience any leg numbness, aching, or discomfort  2. Chest pain or trouble breathing (call 911)  3. Sudden numbness or weakness in your face, arms, or legs (call 911)  4. Bowel movement that is bright red  5. Dizziness or fainting  6. Weight gain of more than 2 pounds in 24 hours or more than 5 pounds in 1 week  7. Swelling in your hands, feet, or ankles  8. Shortness of breath that doesn't get better when you rest  9. Pain that gets worse or doesn't go away  10. Fast or irregular pulse       · Is patient discharged on Warfarin / Coumadin?   No     Depression / Suicide Risk    As you are discharged from this [facility] facility, it is important to learn how to keep safe from harming yourself.    Recognize the warning signs:  · Abrupt changes in personality, positive or negative- including increase in energy   · Giving away possessions  · Change in eating patterns- significant weight changes-  positive or negative  · Change in sleeping patterns- unable to sleep or sleeping all the time   · Unwillingness or inability to communicate  · Depression  · Unusual sadness, discouragement and loneliness  · Talk of wanting to die  · Neglect of personal appearance   · Rebelliousness- reckless behavior  · Withdrawal from  people/activities they love  · Confusion- inability to concentrate     If you or a loved one observes any of these behaviors or has concerns about self-harm, here's what you can do:  · Talk about it- your feelings and reasons for harming yourself  · Remove any means that you might use to hurt yourself (examples: pills, rope, extension cords, firearm)  · Get professional help from the community (Mental Health, Substance Abuse, psychological counseling)  · Do not be alone:Call your Safe Contact- someone whom you trust who will be there for you.  · Call your local CRISIS HOTLINE 951-8734 or 701-326-0567  · Call your local Children's Mobile Crisis Response Team [place] (519) 814-5543 or www.[employer]  · Call the toll free National Suicide Prevention Hotlines   · National Suicide Prevention Lifeline 180-721-UQYP (4028)  · National Hope Line Network 800-SUICIDE (953-6777)

## 2020-07-21 NOTE — PROGRESS NOTES
"Received report and assumed care of patient. Patient is alert and oriented x4. Patient is in no signs of distress denies pain at this time.  at bedside. Bilateral groin sites, CDI, soft. Patient has marsh catheter in place for retention. Patient requests for it to be \"removed in AM so she can sleep.\" Patient was updated on the plan of care for the day. Call light within reach, bed in low position, 2 side rails up. Educated on fall risk, verbalizes understanding. Will continue to monitor.    "

## 2020-07-21 NOTE — CARE PLAN
Problem: Knowledge Deficit  Goal: Knowledge of disease process/condition, treatment plan, diagnostic tests, and medications will improve  Note: Patient updated on plan of care for the day, patient verbalized understanding. All questions and concerns addressed at this time.      Problem: Post Op Day 1 CABG/Heart Valve Replacement  Goal: Optimal care of the post op CABG/heart valve replacement Post Op Day 1  Intervention: Daily Weights  Note: Daily weight documented in flowsheets.  Intervention: Up in chair for all meals  Note: Patient ambulating around entire unit without difficulty.   Intervention: Graduated elastic stockings  Note: TRESSA hose in place, will take off for sleeping.

## 2020-07-21 NOTE — CARE PLAN
Problem: Communication  Goal: The ability to communicate needs accurately and effectively will improve  7/21/2020 0743 by Sosa Cat R.N.  Outcome: PROGRESSING AS EXPECTED  7/20/2020 1846 by Sosa Cat R.N.  Outcome: PROGRESSING AS EXPECTED     Problem: Safety  Goal: Will remain free from injury  7/21/2020 0743 by Sosa Cat R.N.  Outcome: PROGRESSING AS EXPECTED  7/20/2020 1846 by Sosa Cat R.N.  Outcome: PROGRESSING AS EXPECTED  Goal: Will remain free from falls  7/21/2020 0743 by Sosa Cat R.N.  Outcome: PROGRESSING AS EXPECTED  7/20/2020 1846 by Sosa Cat R.N.  Outcome: PROGRESSING AS EXPECTED     Problem: Infection  Goal: Will remain free from infection  7/21/2020 0743 by Sosa Cat R.N.  Outcome: PROGRESSING AS EXPECTED  7/20/2020 1846 by Sosa Cat R.N.  Outcome: PROGRESSING AS EXPECTED     Problem: Venous Thromboembolism (VTW)/Deep Vein Thrombosis (DVT) Prevention:  Goal: Patient will participate in Venous Thrombosis (VTE)/Deep Vein Thrombosis (DVT)Prevention Measures  7/21/2020 0743 by Sosa Cat R.N.  Outcome: PROGRESSING AS EXPECTED  7/20/2020 1846 by Sosa Cat R.N.  Outcome: PROGRESSING AS EXPECTED

## 2020-07-21 NOTE — DISCHARGE SUMMARY
PRIMARY DISCHARGE DIAGNOSIS: Status post transcatheter aortic valve replacement.    PROCEDURES:    1. Successful transcatheter aortic valve replacement (TAVR) with #23 Lomeli Deonna 3 ultra valve with one additional cc of volume, transfemoral approach under  on general anesthesia  2. Intraoperative transesophageal echocardiogram showing pending.   3. Echocardiogram on 7/21/2020 showing Prior echo 6/9/20, there has been placement of a TAVR valve which is normally functioning. Known TAVR aortic valve that is functioning normally with normal transvalvular gradients. No aortic insufficiency.  Left ventricular ejection fraction is visually estimated to be 65%.  Estimated right ventricular systolic pressure  is 30 mmHg.  4. EKG on 7/21/2020 showing SINUS RHYTHM   PROBABLE LEFT VENTRICULAR HYPERTROPHY   BORDERLINE T ABNORMALITIES, INFERIOR LEADS   5. CXR on 7/21/2020 showing  No acute cardiopulmonary disease.  2.  9.5 mm right midlung pulmonary nodule, follow-up chest x-ray in 3 months for evaluation of stability.    HOSPITAL COURSE: The patient is a pleasant 75 year old female with severe symptomatic aortic stenosis, breast cancer, RICHMOND, lung nodule recent biopsy was negative for cancer. Due to the patient's symptoms, the patient underwent successful TAVR described as above. Post-procedure, the patient did well.  They were able to ambulate without difficulty.  No further events were noted during their stay. They are now off oxygen and are to be discharged to [facility] in [facility] due to living in [facility] .    DISCHARGE MEDICATIONS:  Placed on plavix for 3 months, stopped CelebTiffany Pope   Home Medication Instructions MICH:[id number]    Printed on:07/21/20 1421   Medication Information                      acetaminophen (TYLENOL) 500 MG Tab  Take 500-1,000 mg by mouth every 6 hours as needed.             alprazolam (XANAX) 0.5 MG TABS  Take 0.5 mg by mouth as needed for Anxiety. Indications: Feeling  Anxious             aripiprazole (ABILIFY) 5 MG tablet  Take 5 mg by mouth every morning. Indications: Major Depressive Disorder             aspirin 81 MG tablet  Take 81 mg by mouth every day.             clopidogrel (PLAVIX) 75 MG Tab  Take 1 Tab by mouth every day.             Coenzyme Q10 (COQ10 PO)  Take 300 mg by mouth every day.             Cyanocobalamin (VITAMIN B-12) 1000 MCG Tab  Take 3,000 mcg by mouth every day.             Desvenlafaxine Succinate (PRISTIQ) 100 MG TABLET SR 24 HR  Take 1 Tab by mouth every morning. Indications: Major Depressive Disorder             doxylamine (UNISOM) 25 MG Tab tablet  Take 25 mg by mouth every bedtime.             esomeprazole (NEXIUM) 40 MG delayed-release capsule  Take 40 mg by mouth. 3 times/week  Indications: Gastroesophageal Reflux Disease with Current Symptoms             ferrous sulfate 325 (65 Fe) MG tablet  Take 325 mg by mouth every day.             fluticasone (FLONASE ALLERGY RELIEF) 50 MCG/ACT nasal spray  Flonase Allergy Relief             fluvoxAMINE (LUVOX) 50 MG Tab  Take 100 mg by mouth every morning. Indications: Major Depressive Disorder, Obsessive Compulsive Disorder             folic acid (FOLATE) 400 MCG tablet  Take 800 mcg by mouth every day.             Multiple Vitamins-Minerals (MULTIVITAMIN ADULT PO)  Take  by mouth every day.             polyethylene glycol/lytes (MIRALAX) Pack  Take 17 g by mouth every day.             Probiotic Product (PROBIOTIC DAILY PO)  Take 1 Cap by mouth every day.             triamcinolone acetonide (KENALOG) 0.1 % Cream  Apply  to affected area(s) as needed.                   DISCHARGE INSTRUCTIONS: They are given discharge instructions on potential post-operative complications and symptoms to watch out for. Their groin sites were checked and were clean, dry, and intact. Patient or family to notify us for any complications noted on the discharge instructions. They will follow up with myself, on Thursday in our  cardiology office.  They will then follow up with Dr. Reid  with a repeat echocardiogram in one month for post TAVR assessment.

## 2020-07-23 ENCOUNTER — OFFICE VISIT (OUTPATIENT)
Dept: CARDIOLOGY | Facility: MEDICAL CENTER | Age: 75
End: 2020-07-23
Payer: MEDICARE

## 2020-07-23 VITALS
HEART RATE: 82 BPM | OXYGEN SATURATION: 96 % | WEIGHT: 155.42 LBS | HEIGHT: 64 IN | DIASTOLIC BLOOD PRESSURE: 68 MMHG | BODY MASS INDEX: 26.53 KG/M2 | SYSTOLIC BLOOD PRESSURE: 136 MMHG

## 2020-07-23 DIAGNOSIS — Z95.2 S/P TAVR (TRANSCATHETER AORTIC VALVE REPLACEMENT): ICD-10-CM

## 2020-07-23 DIAGNOSIS — I65.23 BILATERAL CAROTID ARTERY STENOSIS: Chronic | ICD-10-CM

## 2020-07-23 DIAGNOSIS — G47.33 OSA (OBSTRUCTIVE SLEEP APNEA): ICD-10-CM

## 2020-07-23 PROCEDURE — 93000 ELECTROCARDIOGRAM COMPLETE: CPT | Performed by: INTERNAL MEDICINE

## 2020-07-23 PROCEDURE — 99214 OFFICE O/P EST MOD 30 MIN: CPT | Performed by: NURSE PRACTITIONER

## 2020-07-23 ASSESSMENT — ENCOUNTER SYMPTOMS
WEAKNESS: 0
FOCAL WEAKNESS: 0
ORTHOPNEA: 0
LOSS OF CONSCIOUSNESS: 0
BLURRED VISION: 0
DIZZINESS: 0
DOUBLE VISION: 0
FALLS: 0
PALPITATIONS: 0
SHORTNESS OF BREATH: 0
HEADACHES: 0
WHEEZING: 0
COUGH: 0

## 2020-07-23 ASSESSMENT — FIBROSIS 4 INDEX: FIB4 SCORE: 1.78

## 2020-07-23 NOTE — PROGRESS NOTES
"Chief Complaint   Patient presents with   • Follow-Up     1 Week S/P TAVR       Subjective:   Tiffany Louis is a 75 y.o. female who presents today 1 week s/p TAVR.    She underwent a successful transcatheter aortic valve replacement (TAVR) with #23 Lomeli Deonna 3 ultra valve with one additional cc of volume, transfemoral approach under  on general anesthesia. Post op ECHO showed normal functioning of TAVR with normal transvalvular gradient.    History of breast cancer, RICHMOND, lung nodule recent biopsy was negative for cancer.      Interim events;  Patient is doing well. She was staying in [place] with her  at a hotel. They have been walking around town and in [facility] without any dyspnea.     Access site is healing and no complication.    Past Medical History:   Diagnosis Date   • Anemia    • Anesthesia     \"very low blood pressure\"   • Aortic valve stenosis, severe    • Arthritis     osteo, \"all over\"   • Bilateral carotid artery stenosis    • Breast cancer (HCC)     left   • Breath shortness     from aortic stenosis   • Cancer (HCC)     skin   • Cancer (HCC) 2015    breast   • Dental disorder     upper implants front teeth   • Depression    • GERD (gastroesophageal reflux disease)    • Heart murmur    • Joint replacement     bilateral knees and right hip   • Nasal drainage    • Obesity    • Osteoporosis    • Psychiatric problem     anxiety/depression   • Renal disorder 06/11/2020    kidney stone   • Restless leg syndrome    • Sleep apnea     CPAP   • Snoring    • Umbilical hernia 10/2016   • Unspecified urinary incontinence    • Urinary bladder disorder 6/21/2017    reports freq infections but none in the past year; on ABX, + bacteria in \"gut\"     Past Surgical History:   Procedure Laterality Date   • TRANSCATHETER AORTIC VALVE REPLACEMENT  7/20/2020    Procedure: REPLACEMENT, AORTIC VALVE, TRANSCATHETER;  Surgeon: Mukesh Reid M.D.;  Location: SURGERY [facility];  Service: Cardiac   • NEHA "  7/20/2020    Procedure: ECHOCARDIOGRAM, TRANSESOPHAGEAL-;  Surgeon: Mukesh Reid M.D.;  Location: [facility];  Service: Cardiac   • LUNG BIOPSY OPEN Right 06/2020   • LATISSIMUS FLAP Left 6/27/2017    Procedure: LATISSIMUS FLAP W/INSERTION OF IMPLANT;  Surgeon: Johnny Flannery M.D.;  Location: [facility];  Service:    • BREAST IMPLANT REMOVAL Left 6/27/2017    Procedure: BREAST IMPLANT REMOVAL;  Surgeon: Johnny Flannery M.D.;  Location: [facility];  Service:    • CAPSULECTOMY  4/13/2017    and debridement left breast   • BOWEL RESECTION  12/28/2016   • BREAST RECONSTRUCTION Bilateral 11/29/2016    Procedure: BREAST RECONSTRUCTION;  Surgeon: Johnny Flannery M.D.;  Location: [facility];  Service:    • LATISSIMUS FLAP Right 11/29/2016    Procedure: LATISSIMUS FLAP W/ IMPLANT;  Surgeon: Johnny Flannery M.D.;  Location: [facility];  Service:    • TISSUE EXPANDER PLACE/REMOVE Left 11/29/2016    Procedure: TISSUE EXPANDER REMOVE - REMOVAL AND INSERTION OF IMPLANT;  Surgeon: Johnny Flannery M.D.;  Location: [facility];  Service:    • CAPSULECTOMY Bilateral 11/29/2016    Procedure: CAPSULECTOMY;  Surgeon: Johnny Flannery M.D.;  Location: [facility];  Service:    • OTHER SURGICAL PROCEDURE Right 7/2016    debridement and breast expander placement   • BREAST IMPLANT REMOVAL Right 1/6/2016    Procedure: BREAST IMPLANT REMOVAL, placement of drain;  Surgeon: Jon Leblanc M.D.;  Location: [facility];  Service:    • OTHER SURGICAL PROCEDURE Right 1/2016     second debridement right breast   • CATH PLACEMENT Right 9/8/2015    Procedure: CATH PLACEMENT PORT;  Surgeon: Vargas Hyde M.D.;  Location: [facility];  Service:    • OTHER SURGICAL PROCEDURE  9/8/2015    Port placement for chemo   • MASTECTOMY MODIFIED RADICAL Left 8/10/2015    Procedure: MASTECTOMY MODIFIED RADICAL;  Surgeon:  "Vargas Hyde M.D.;  Location: SURGERY [facility];  Service:    • MASTECTOMY Right 8/10/2015    Procedure: MASTECTOMY SIMPLE;  Surgeon: Vargas Hyde M.D.;  Location: SURGERY [facility];  Service:    • NODE BIOPSY SENTINEL Left 8/10/2015    Procedure: NODE BIOPSY SENTINEL;  Surgeon: Vargas Hyde M.D.;  Location: SURGERY [facility];  Service:    • BREAST RECONSTRUCTION Bilateral 8/10/2015    Procedure: BREAST RECONSTRUCTION VIA;  Surgeon: Johnny Flannery M.D.;  Location: SURGERY [facility];  Service:    • TISSUE EXPANDER PLACE/REMOVE Bilateral 8/10/2015    Procedure: TISSUE EXPANDER PLACE/REMOVE & POSSIBLE ALLODERM;  Surgeon: Johnny Flannery M.D.;  Location: SURGERY [facility];  Service:    • ROTATOR CUFF REPAIR Right 2009    right   • HIP ARTHROPLASTY TOTAL Right 2008    Hip Replacement, Total, right   • HAND SURGERY Right 2/12/07    joint Right Thumb   • ABDOMINOPLASTY  10/28/04   • GASTRIC BYPASS LAPAROSCOPIC  10/10/01   • KNEE ARTHROPLASTY TOTAL Bilateral 1/24/00    bilateral knees   • OTHER  9/99    tumor exc Right hip   • INGRID BY LAPAROSCOPY  9/97   • BLADDER SUSPENSION  12/93   • HYSTERECTOMY, TOTAL ABDOMINAL  5/93    rectal, bladder repair   • EAR MIDDLE EXPLORATION Left 4/90    Left   • BREAST BIOPSY Left 6/85   • TUBAL LIGATION  3/84   • ARTHROSCOPY, KNEE     • CHOLECYSTECTOMY     • COLON RESECTION     • KNEE ARTHROSCOPY Left 1983, 12/97    Left   • KNEE ARTHROSCOPY Right 6/88, 12/91, 11/97    Right   • OTHER  5/97, 4/05/05    vaginal repair   • OTHER SURGICAL PROCEDURE  4/06, 10/06    bilateral \"arm lift\"   • SLEEVE,ARLEEN VASO THIGH     • TONSILLECTOMY       Family History   Problem Relation Age of Onset   • Kidney Disease Mother    • Heart Failure Father      Social History     Socioeconomic History   • Marital status:      Spouse name: Not on file   • Number of children: Not on file   • Years of education: Not on file   • Highest education level: Not on file "   Occupational History   • Not on file   Social Needs   • Financial resource strain: Not on file   • Food insecurity     Worry: Not on file     Inability: Not on file   • Transportation needs     Medical: Not on file     Non-medical: Not on file   Tobacco Use   • Smoking status: Never Smoker   • Smokeless tobacco: Never Used   Substance and Sexual Activity   • Alcohol use: Yes     Comment: reports 1 /week   • Drug use: No   • Sexual activity: Not on file   Lifestyle   • Physical activity     Days per week: Not on file     Minutes per session: Not on file   • Stress: Not on file   Relationships   • Social connections     Talks on phone: Not on file     Gets together: Not on file     Attends [employer] service: Not on file     Active member of club or organization: Not on file     Attends meetings of clubs or organizations: Not on file     Relationship status: Not on file   • Intimate partner violence     Fear of current or ex partner: Not on file     Emotionally abused: Not on file     Physically abused: Not on file     Forced sexual activity: Not on file   Other Topics Concern   • Not on file   Social History Narrative   • Not on file     Allergies   Allergen Reactions   • Ampicillin Shortness of Breath     Tolerated Cefazolin on 08/10/15   • Clindamycin Shortness of Breath and Rash     .   • Levaquin Shortness of Breath   • Penicillins Shortness of Breath and Rash     Tolerated Ancef 08/10/15   • Amlodipine      swelling   • Demerol Itching     Itching and rash     • Lisinopril Cough     Outpatient Encounter Medications as of 7/23/2020   Medication Sig Dispense Refill   • clopidogrel (PLAVIX) 75 MG Tab Take 1 Tab by mouth every day. 90 Tab 0   • ferrous sulfate 325 (65 Fe) MG tablet Take 325 mg by mouth every day.     • doxylamine (UNISOM) 25 MG Tab tablet Take 25 mg by mouth every bedtime.     • polyethylene glycol/lytes (MIRALAX) Pack Take 17 g by mouth every day.     • fluticasone (FLONASE ALLERGY RELIEF) 50  MCG/ACT nasal spray Flonase Allergy Relief     • triamcinolone acetonide (KENALOG) 0.1 % Cream Apply  to affected area(s) as needed.     • Desvenlafaxine Succinate (PRISTIQ) 100 MG TABLET SR 24 HR Take 1 Tab by mouth every morning. Indications: Major Depressive Disorder     • fluvoxAMINE (LUVOX) 50 MG Tab Take 100 mg by mouth every morning. Indications: Major Depressive Disorder, Obsessive Compulsive Disorder     • folic acid (FOLATE) 400 MCG tablet Take 800 mcg by mouth every day.     • Coenzyme Q10 (COQ10 PO) Take 300 mg by mouth every day.     • Cyanocobalamin (VITAMIN B-12) 1000 MCG Tab Take 3,000 mcg by mouth every day.     • Multiple Vitamins-Minerals (MULTIVITAMIN ADULT PO) Take  by mouth every day.     • aspirin 81 MG tablet Take 81 mg by mouth every day.     • aripiprazole (ABILIFY) 5 MG tablet Take 5 mg by mouth every morning. Indications: Major Depressive Disorder     • esomeprazole (NEXIUM) 40 MG delayed-release capsule Take 40 mg by mouth. 3 times/week  Indications: Gastroesophageal Reflux Disease with Current Symptoms     • alprazolam (XANAX) 0.5 MG TABS Take 0.5 mg by mouth as needed for Anxiety. Indications: Feeling Anxious     • Probiotic Product (PROBIOTIC DAILY PO) Take 1 Cap by mouth every day.     • acetaminophen (TYLENOL) 500 MG Tab Take 500-1,000 mg by mouth every 6 hours as needed.       No facility-administered encounter medications on file as of 7/23/2020.      Review of Systems   Constitutional: Negative for malaise/fatigue.   Eyes: Negative for blurred vision and double vision.   Respiratory: Negative for cough, shortness of breath and wheezing.    Cardiovascular: Negative for chest pain, palpitations, orthopnea and leg swelling.   Musculoskeletal: Negative for falls.   Neurological: Negative for dizziness, focal weakness, loss of consciousness, weakness and headaches.   All other systems reviewed and are negative.       Objective:   /68 (BP Location: Left arm, Patient Position:  "Sitting, BP Cuff Size: Adult)   Pulse 82   Ht 1.626 m (5' 4\")   Wt 70.5 kg (155 lb 6.8 oz)   SpO2 96%   BMI 26.68 kg/m²     Physical Exam   Constitutional: She is oriented to person, place, and time. She appears well-developed and well-nourished. No distress.   HENT:   Head: Normocephalic and atraumatic.   Eyes: Pupils are equal, round, and reactive to light.   Neck: No JVD present.   Cardiovascular: Normal rate, regular rhythm and normal heart sounds.   No murmur heard.  Pulmonary/Chest: Effort normal and breath sounds normal.   Abdominal: Soft. Bowel sounds are normal. She exhibits no distension.   Musculoskeletal:         General: No edema.   Neurological: She is alert and oriented to person, place, and time.   Skin: Skin is warm and dry. She is not diaphoretic.   Psychiatric: She has a normal mood and affect. Her behavior is normal. Judgment and thought content normal.       Echocardiogram on 7/21/2020 showing Prior echo 6/9/20, there has been placement of a TAVR valve which is normally functioning. Known TAVR aortic valve that is functioning normally with normal transvalvular gradients. No aortic insufficiency.  Left ventricular ejection fraction is visually estimated to be 65%.  Estimated right ventricular systolic pressure  is 30 mmHg    Assessment:     1. S/P TAVR (transcatheter aortic valve replacement)  EKG   2. RICHMOND (obstructive sleep apnea)     3. Bilateral carotid artery stenosis         Medical Decision Making:  Today's Assessment / Status / Plan:     1. S/p TAVR:  - patient doing well. No edema noted bilateral LE.   - EKG today showed NSR   - incision sites are clean, dry, and intact. Pulse 2+ bilateral.  - repeat ECHO in 1 month  - prophylactic antibiotics prior to any dental care: card provided to the patient and explained  - Cardiac Rehab consult placed  - 1 month follow up appointment with Dr. Kearns  - 3 months follow up appointment with Dr. Alfaro     2. RICHMOND:  - cpap     3. Elevated blood " pressure:  - while in the hospital, today it is WNL. Monitor at home     4. Lung nodule:  - following up with oncologist     Follow up in 1 months with Dr. Reid, ECHO prior to next appt.     Collaborating Provider: Dr. Sue

## 2020-07-23 NOTE — LETTER
"     [facility]   [address], [address]  [place] [address]  Phone: [phone]  Fax: [phone]              Tiffany Louis  1945    Encounter Date: 7/23/2020    FRED Mason          PROGRESS NOTE:  Chief Complaint   Patient presents with   • Follow-Up     1 Week S/P TAVR       Subjective:   Tiffany Louis is a 75 y.o. female who presents today 1 week s/p TAVR.    She underwent a successful transcatheter aortic valve replacement (TAVR) with #23 Lomeli Deonna 3 ultra valve with one additional cc of volume, transfemoral approach under  on general anesthesia. Post op ECHO showed normal functioning of TAVR with normal transvalvular gradient.    History of breast cancer, RICHMOND, lung nodule recent biopsy was negative for cancer.      Interim events;  Patient is doing well. She was staying in [place] with her  at a hotel. They have been walking around town and in [facility] without any dyspnea.     Access site is healing and no complication.    Past Medical History:   Diagnosis Date   • Anemia    • Anesthesia     \"very low blood pressure\"   • Aortic valve stenosis, severe    • Arthritis     osteo, \"all over\"   • Bilateral carotid artery stenosis    • Breast cancer (HCC)     left   • Breath shortness     from aortic stenosis   • Cancer (HCC)     skin   • Cancer (HCC) 2015    breast   • Dental disorder     upper implants front teeth   • Depression    • GERD (gastroesophageal reflux disease)    • Heart murmur    • Joint replacement     bilateral knees and right hip   • Nasal drainage    • Obesity    • Osteoporosis    • Psychiatric problem     anxiety/depression   • Renal disorder 06/11/2020    kidney stone   • Restless leg syndrome    • Sleep apnea     CPAP   • Snoring    • Umbilical hernia 10/2016   • Unspecified urinary incontinence    • Urinary bladder disorder 6/21/2017    reports freq infections but none in the past year; on ABX, + bacteria in " "\"gut\"     Past Surgical History:   Procedure Laterality Date   • TRANSCATHETER AORTIC VALVE REPLACEMENT  7/20/2020    Procedure: REPLACEMENT, AORTIC VALVE, TRANSCATHETER;  Surgeon: Mukesh Reid M.D.;  Location: [facility];  Service: Cardiac   • NEHA  7/20/2020    Procedure: ECHOCARDIOGRAM, TRANSESOPHAGEAL-;  Surgeon: Mukesh Reid M.D.;  Location: [facility];  Service: Cardiac   • LUNG BIOPSY OPEN Right 06/2020   • LATISSIMUS FLAP Left 6/27/2017    Procedure: LATISSIMUS FLAP W/INSERTION OF IMPLANT;  Surgeon: Johnny Flannery M.D.;  Location: [facility];  Service:    • BREAST IMPLANT REMOVAL Left 6/27/2017    Procedure: BREAST IMPLANT REMOVAL;  Surgeon: Johnny Flannery M.D.;  Location: [facility];  Service:    • CAPSULECTOMY  4/13/2017    and debridement left breast   • BOWEL RESECTION  12/28/2016   • BREAST RECONSTRUCTION Bilateral 11/29/2016    Procedure: BREAST RECONSTRUCTION;  Surgeon: Johnny Flannery M.D.;  Location: [facility];  Service:    • LATISSIMUS FLAP Right 11/29/2016    Procedure: LATISSIMUS FLAP W/ IMPLANT;  Surgeon: Johnny Flannery M.D.;  Location: [facility];  Service:    • TISSUE EXPANDER PLACE/REMOVE Left 11/29/2016    Procedure: TISSUE EXPANDER REMOVE - REMOVAL AND INSERTION OF IMPLANT;  Surgeon: Johnny Flannery M.D.;  Location: [facility];  Service:    • CAPSULECTOMY Bilateral 11/29/2016    Procedure: CAPSULECTOMY;  Surgeon: Johnny Flannery M.D.;  Location: [facility];  Service:    • OTHER SURGICAL PROCEDURE Right 7/2016    debridement and breast expander placement   • BREAST IMPLANT REMOVAL Right 1/6/2016    Procedure: BREAST IMPLANT REMOVAL, placement of drain;  Surgeon: Jon Leblanc M.D.;  Location: [facility];  Service:    • OTHER SURGICAL PROCEDURE Right 1/2016     second debridement right breast   • CATH PLACEMENT Right 9/8/2015   " "Procedure: CATH PLACEMENT PORT;  Surgeon: Vargas Hyde M.D.;  [facility];  Service:    • OTHER SURGICAL PROCEDURE  9/8/2015    Port placement for chemo   • MASTECTOMY MODIFIED RADICAL Left 8/10/2015    Procedure: MASTECTOMY MODIFIED RADICAL;  Surgeon: Vargas Hyde M.D.;  Location: [facility];  Service:    • MASTECTOMY Right 8/10/2015    Procedure: MASTECTOMY SIMPLE;  Surgeon: Vargas Hyde M.D.;  Location: [facility];  Service:    • NODE BIOPSY SENTINEL Left 8/10/2015    Procedure: NODE BIOPSY SENTINEL;  Surgeon: Vargas Hyde M.D.;  Location: [facility];  Service:    • BREAST RECONSTRUCTION Bilateral 8/10/2015    Procedure: BREAST RECONSTRUCTION VIA;  Surgeon: Johnny Flannery M.D.;  Location: [facility];  Service:    • TISSUE EXPANDER PLACE/REMOVE Bilateral 8/10/2015    Procedure: TISSUE EXPANDER PLACE/REMOVE & POSSIBLE ALLODERM;  Surgeon: Johnny Flannery M.D.;  Location: [facility];  Service:    • ROTATOR CUFF REPAIR Right 2009    right   • HIP ARTHROPLASTY TOTAL Right 2008    Hip Replacement, Total, right   • HAND SURGERY Right 2/12/07    joint Right Thumb   • ABDOMINOPLASTY  10/28/04   • GASTRIC BYPASS LAPAROSCOPIC  10/10/01   • KNEE ARTHROPLASTY TOTAL Bilateral 1/24/00    bilateral knees   • OTHER  9/99    tumor exc Right hip   • INGRID BY LAPAROSCOPY  9/97   • BLADDER SUSPENSION  12/93   • HYSTERECTOMY, TOTAL ABDOMINAL  5/93    rectal, bladder repair   • EAR MIDDLE EXPLORATION Left 4/90    Left   • BREAST BIOPSY Left 6/85   • TUBAL LIGATION  3/84   • ARTHROSCOPY, KNEE     • CHOLECYSTECTOMY     • COLON RESECTION     • KNEE ARTHROSCOPY Left 1983, 12/97    Left   • KNEE ARTHROSCOPY Right 6/88, 12/91, 11/97    Right   • OTHER  5/97, 4/05/05    vaginal repair   • OTHER SURGICAL PROCEDURE  4/06, 10/06    bilateral \"arm lift\"   • SLEEVE,ARLEEN VASO THIGH     • TONSILLECTOMY       Family History   Problem Relation Age of " Onset   • Kidney Disease Mother    • Heart Failure Father      Social History     Socioeconomic History   • Marital status:      Spouse name: Not on file   • Number of children: Not on file   • Years of education: Not on file   • Highest education level: Not on file   Occupational History   • Not on file   Social Needs   • Financial resource strain: Not on file   • Food insecurity     Worry: Not on file     Inability: Not on file   • Transportation needs     Medical: Not on file     Non-medical: Not on file   Tobacco Use   • Smoking status: Never Smoker   • Smokeless tobacco: Never Used   Substance and Sexual Activity   • Alcohol use: Yes     Comment: reports 1 /week   • Drug use: No   • Sexual activity: Not on file   Lifestyle   • Physical activity     Days per week: Not on file     Minutes per session: Not on file   • Stress: Not on file   Relationships   • Social connections     Talks on phone: Not on file     Gets together: Not on file     Attends Taoist service: Not on file     Active member of club or organization: Not on file     Attends meetings of clubs or organizations: Not on file     Relationship status: Not on file   • Intimate partner violence     Fear of current or ex partner: Not on file     Emotionally abused: Not on file     Physically abused: Not on file     Forced sexual activity: Not on file   Other Topics Concern   • Not on file   Social History Narrative   • Not on file     Allergies   Allergen Reactions   • Ampicillin Shortness of Breath     Tolerated Cefazolin on 08/10/15   • Clindamycin Shortness of Breath and Rash     .   • Levaquin Shortness of Breath   • Penicillins Shortness of Breath and Rash     Tolerated Ancef 08/10/15   • Amlodipine      swelling   • Demerol Itching     Itching and rash     • Lisinopril Cough     Outpatient Encounter Medications as of 7/23/2020   Medication Sig Dispense Refill   • clopidogrel (PLAVIX) 75 MG Tab Take 1 Tab by mouth every day. 90 Tab 0   •  ferrous sulfate 325 (65 Fe) MG tablet Take 325 mg by mouth every day.     • doxylamine (UNISOM) 25 MG Tab tablet Take 25 mg by mouth every bedtime.     • polyethylene glycol/lytes (MIRALAX) Pack Take 17 g by mouth every day.     • fluticasone (FLONASE ALLERGY RELIEF) 50 MCG/ACT nasal spray Flonase Allergy Relief     • triamcinolone acetonide (KENALOG) 0.1 % Cream Apply  to affected area(s) as needed.     • Desvenlafaxine Succinate (PRISTIQ) 100 MG TABLET SR 24 HR Take 1 Tab by mouth every morning. Indications: Major Depressive Disorder     • fluvoxAMINE (LUVOX) 50 MG Tab Take 100 mg by mouth every morning. Indications: Major Depressive Disorder, Obsessive Compulsive Disorder     • folic acid (FOLATE) 400 MCG tablet Take 800 mcg by mouth every day.     • Coenzyme Q10 (COQ10 PO) Take 300 mg by mouth every day.     • Cyanocobalamin (VITAMIN B-12) 1000 MCG Tab Take 3,000 mcg by mouth every day.     • Multiple Vitamins-Minerals (MULTIVITAMIN ADULT PO) Take  by mouth every day.     • aspirin 81 MG tablet Take 81 mg by mouth every day.     • aripiprazole (ABILIFY) 5 MG tablet Take 5 mg by mouth every morning. Indications: Major Depressive Disorder     • esomeprazole (NEXIUM) 40 MG delayed-release capsule Take 40 mg by mouth. 3 times/week  Indications: Gastroesophageal Reflux Disease with Current Symptoms     • alprazolam (XANAX) 0.5 MG TABS Take 0.5 mg by mouth as needed for Anxiety. Indications: Feeling Anxious     • Probiotic Product (PROBIOTIC DAILY PO) Take 1 Cap by mouth every day.     • acetaminophen (TYLENOL) 500 MG Tab Take 500-1,000 mg by mouth every 6 hours as needed.       No facility-administered encounter medications on file as of 7/23/2020.      Review of Systems   Constitutional: Negative for malaise/fatigue.   Eyes: Negative for blurred vision and double vision.   Respiratory: Negative for cough, shortness of breath and wheezing.    Cardiovascular: Negative for chest pain, palpitations, orthopnea and leg  "swelling.   Musculoskeletal: Negative for falls.   Neurological: Negative for dizziness, focal weakness, loss of consciousness, weakness and headaches.   All other systems reviewed and are negative.       Objective:   /68 (BP Location: Left arm, Patient Position: Sitting, BP Cuff Size: Adult)   Pulse 82   Ht 1.626 m (5' 4\")   Wt 70.5 kg (155 lb 6.8 oz)   SpO2 96%   BMI 26.68 kg/m²     Physical Exam   Constitutional: She is oriented to person, place, and time. She appears well-developed and well-nourished. No distress.   HENT:   Head: Normocephalic and atraumatic.   Eyes: Pupils are equal, round, and reactive to light.   Neck: No JVD present.   Cardiovascular: Normal rate, regular rhythm and normal heart sounds.   No murmur heard.  Pulmonary/Chest: Effort normal and breath sounds normal.   Abdominal: Soft. Bowel sounds are normal. She exhibits no distension.   Musculoskeletal:         General: No edema.   Neurological: She is alert and oriented to person, place, and time.   Skin: Skin is warm and dry. She is not diaphoretic.   Psychiatric: She has a normal mood and affect. Her behavior is normal. Judgment and thought content normal.       Echocardiogram on 7/21/2020 showing Prior echo 6/9/20, there has been placement of a TAVR valve which is normally functioning. Known TAVR aortic valve that is functioning normally with normal transvalvular gradients. No aortic insufficiency.  Left ventricular ejection fraction is visually estimated to be 65%.  Estimated right ventricular systolic pressure  is 30 mmHg    Assessment:     1. S/P TAVR (transcatheter aortic valve replacement)  EKG   2. RICHMOND (obstructive sleep apnea)     3. Bilateral carotid artery stenosis         Medical Decision Making:  Today's Assessment / Status / Plan:     1. S/p TAVR:  - patient doing well. No edema noted bilateral LE.   - EKG today showed NSR   - incision sites are clean, dry, and intact. Pulse 2+ bilateral.  - repeat ECHO in 1 month  - " prophylactic antibiotics prior to any dental care: card provided to the patient and explained  - Cardiac Rehab consult placed  - 1 month follow up appointment with Dr. Kearns  - 3 months follow up appointment with Dr. Alfaro     2. RICHMOND:  - cpap     3. Elevated blood pressure:  - while in the hospital, today it is WNL. Monitor at home     4. Lung nodule:  - following up with oncologist     Follow up in 1 months with Dr. Reid, ECHO prior to next appt.     Collaborating Provider: Dr. Linette Fuentes M.D.  [address] [place] [address]  VIA Facsimile: [phone]     Denise Mcnulty M.D.  [address] [place] [address]  VIA Facsimile: [phone]

## 2020-07-24 LAB — EKG IMPRESSION: NORMAL

## 2020-07-27 ENCOUNTER — TELEPHONE (OUTPATIENT)
Dept: CARDIOLOGY | Facility: MEDICAL CENTER | Age: 75
End: 2020-07-27

## 2020-07-27 NOTE — TELEPHONE ENCOUNTER
RT    Patient wants to know if you want them to keep track of the BP. Pt will give you figures. Pt wants to know if they need to be on medication. Please call the Pt back at [phone].    Thank you,  Mariluz WANG

## 2020-07-28 RX ORDER — LOSARTAN POTASSIUM 25 MG/1
25 TABLET ORAL DAILY
Qty: 90 TAB | Refills: 3 | Status: SHIPPED | OUTPATIENT
Start: 2020-07-28 | End: 2020-09-23

## 2020-08-06 ENCOUNTER — TELEPHONE (OUTPATIENT)
Dept: CARDIOLOGY | Facility: MEDICAL CENTER | Age: 75
End: 2020-08-06

## 2020-08-06 NOTE — TELEPHONE ENCOUNTER
Pt. Was previously a pt. Of Dr. Leonard Maki at the [facility]. This clinic has closed and pt. Would like [facility] to refer her to a GYN in [place]. Pt. Is not having any problems.  To [facility].

## 2020-08-06 NOTE — TELEPHONE ENCOUNTER
RT    Hello, patient will like a referral sent to see a GYN . She will like a call back at [phone]

## 2020-09-21 ENCOUNTER — HOSPITAL ENCOUNTER (OUTPATIENT)
Dept: CARDIOLOGY | Facility: MEDICAL CENTER | Age: 75
End: 2020-09-21
Attending: INTERNAL MEDICINE
Payer: MEDICARE

## 2020-09-21 DIAGNOSIS — Z95.2 S/P TAVR (TRANSCATHETER AORTIC VALVE REPLACEMENT): ICD-10-CM

## 2020-09-21 PROCEDURE — 93306 TTE W/DOPPLER COMPLETE: CPT

## 2020-09-22 LAB
LV EJECT FRACT  99904: 65
LV EJECT FRACT MOD 2C 99903: 74.5
LV EJECT FRACT MOD 4C 99902: 72.73
LV EJECT FRACT MOD BP 99901: 71.85

## 2020-09-22 PROCEDURE — 93306 TTE W/DOPPLER COMPLETE: CPT | Mod: 26 | Performed by: INTERNAL MEDICINE

## 2020-09-23 ENCOUNTER — TELEPHONE (OUTPATIENT)
Dept: CARDIOLOGY | Facility: MEDICAL CENTER | Age: 75
End: 2020-09-23

## 2020-09-23 ENCOUNTER — OFFICE VISIT (OUTPATIENT)
Dept: CARDIOLOGY | Facility: MEDICAL CENTER | Age: 75
End: 2020-09-23
Payer: MEDICARE

## 2020-09-23 VITALS
OXYGEN SATURATION: 98 % | BODY MASS INDEX: 26.98 KG/M2 | DIASTOLIC BLOOD PRESSURE: 70 MMHG | HEIGHT: 64 IN | WEIGHT: 158 LBS | SYSTOLIC BLOOD PRESSURE: 160 MMHG | HEART RATE: 60 BPM | RESPIRATION RATE: 16 BRPM

## 2020-09-23 DIAGNOSIS — Z95.2 S/P TAVR (TRANSCATHETER AORTIC VALVE REPLACEMENT): ICD-10-CM

## 2020-09-23 LAB — EKG IMPRESSION: NORMAL

## 2020-09-23 PROCEDURE — 93000 ELECTROCARDIOGRAM COMPLETE: CPT | Performed by: INTERNAL MEDICINE

## 2020-09-23 PROCEDURE — 99214 OFFICE O/P EST MOD 30 MIN: CPT | Performed by: INTERNAL MEDICINE

## 2020-09-23 RX ORDER — LOSARTAN POTASSIUM AND HYDROCHLOROTHIAZIDE 12.5; 1 MG/1; MG/1
1 TABLET ORAL DAILY
Qty: 30 TAB | Refills: 11 | Status: SHIPPED | OUTPATIENT
Start: 2020-09-23 | End: 2021-02-26 | Stop reason: SDUPTHER

## 2020-09-23 ASSESSMENT — FIBROSIS 4 INDEX: FIB4 SCORE: 1.78

## 2020-09-23 NOTE — PROGRESS NOTES
"      Cardiology Follow-up Consultation Note    Date of note:    9/23/2020    Primary Care Provider: Denise Mcnulty M.D.    Name:             Tiffany Louis   YOB: 1945  MRN:               [id number]    CC: 1 month follow-up post TAVR    Patient ID/HPI:   75-year-old female patient with history of breast cancer, obstructive sleep apnea, severe aortic stenosis underwent transcatheter aortic valve replacement.  Here for one-month follow-up.  She still feels significant shortness of breath with exertion, she is better since the surgery but not as better as she would like.  No dizziness chest pain fainting spells etc.  Blood pressure has been staying high around 160 at home as well.      ROS  All other systems reviewed and negative.    Past Medical History:   Diagnosis Date   • Anemia    • Anesthesia     \"very low blood pressure\"   • Aortic valve stenosis, severe    • Arthritis     osteo, \"all over\"   • Bilateral carotid artery stenosis    • Breast cancer (HCC)     left   • Breath shortness     from aortic stenosis   • Cancer (HCC)     skin   • Cancer (HCC) 2015    breast   • Dental disorder     upper implants front teeth   • Depression    • GERD (gastroesophageal reflux disease)    • Heart murmur    • Joint replacement     bilateral knees and right hip   • Nasal drainage    • Obesity    • Osteoporosis    • Psychiatric problem     anxiety/depression   • Renal disorder 06/11/2020    kidney stone   • Restless leg syndrome    • Sleep apnea     CPAP   • Snoring    • Umbilical hernia 10/2016   • Unspecified urinary incontinence    • Urinary bladder disorder 6/21/2017    reports freq infections but none in the past year; on ABX, + bacteria in \"gut\"         Past Surgical History:   Procedure Laterality Date   • TRANSCATHETER AORTIC VALVE REPLACEMENT  7/20/2020    Procedure: REPLACEMENT, AORTIC VALVE, TRANSCATHETER;  Surgeon: Mukesh Reid M.D.;  Location: SURGERY [facility];  Service: " Cardiac   • NEHA  7/20/2020    Procedure: ECHOCARDIOGRAM, TRANSESOPHAGEAL-;  Surgeon: Mukesh Reid M.D.;  Location: [facility];  Service: Cardiac   • LUNG BIOPSY OPEN Right 06/2020   • LATISSIMUS FLAP Left 6/27/2017    Procedure: LATISSIMUS FLAP W/INSERTION OF IMPLANT;  Surgeon: Johnny Flannery M.D.;  Location: [facility];  Service:    • BREAST IMPLANT REMOVAL Left 6/27/2017    Procedure: BREAST IMPLANT REMOVAL;  Surgeon: Johnny Flannery M.D.;  Location: [facility];  Service:    • CAPSULECTOMY  4/13/2017    and debridement left breast   • BOWEL RESECTION  12/28/2016   • BREAST RECONSTRUCTION Bilateral 11/29/2016    Procedure: BREAST RECONSTRUCTION;  Surgeon: Johnny Flannery M.D.;  Location: [facility];  Service:    • LATISSIMUS FLAP Right 11/29/2016    Procedure: LATISSIMUS FLAP W/ IMPLANT;  Surgeon: Johnny Flannery M.D.;  Location: [facility];  Service:    • TISSUE EXPANDER PLACE/REMOVE Left 11/29/2016    Procedure: TISSUE EXPANDER REMOVE - REMOVAL AND INSERTION OF IMPLANT;  Surgeon: Johnny Flannery M.D.;  Location: [facility];  Service:    • CAPSULECTOMY Bilateral 11/29/2016    Procedure: CAPSULECTOMY;  Surgeon: Johnny Flannery M.D.;  Location: [facility];  Service:    • OTHER SURGICAL PROCEDURE Right 7/2016    debridement and breast expander placement   • BREAST IMPLANT REMOVAL Right 1/6/2016    Procedure: BREAST IMPLANT REMOVAL, placement of drain;  Surgeon: Jon Leblanc M.D.;  Location: [facility];  Service:    • OTHER SURGICAL PROCEDURE Right 1/2016     second debridement right breast   • CATH PLACEMENT Right 9/8/2015    Procedure: CATH PLACEMENT PORT;  Surgeon: Vargas Hyde M.D.;  Location: [facility];  Service:    • OTHER SURGICAL PROCEDURE  9/8/2015    Port placement for chemo   • MASTECTOMY MODIFIED RADICAL Left 8/10/2015    Procedure: MASTECTOMY MODIFIED  "RADICAL;  Surgeon: Vargas Hyde M.D.;  Location: SURGERY [facility];  Service:    • MASTECTOMY Right 8/10/2015    Procedure: MASTECTOMY SIMPLE;  Surgeon: Vargas Hyde M.D.;  Location: SURGERY [facility];  Service:    • NODE BIOPSY SENTINEL Left 8/10/2015    Procedure: NODE BIOPSY SENTINEL;  Surgeon: Vargas Hyde M.D.;  Location: SURGERY [facility];  Service:    • BREAST RECONSTRUCTION Bilateral 8/10/2015    Procedure: BREAST RECONSTRUCTION VIA;  Surgeon: Johnny Flannery M.D.;  Location: SURGERY [facility];  Service:    • TISSUE EXPANDER PLACE/REMOVE Bilateral 8/10/2015    Procedure: TISSUE EXPANDER PLACE/REMOVE & POSSIBLE ALLODERM;  Surgeon: Johnny Flannery M.D.;  Location: SURGERY [facility];  Service:    • ROTATOR CUFF REPAIR Right 2009    right   • HIP ARTHROPLASTY TOTAL Right 2008    Hip Replacement, Total, right   • HAND SURGERY Right 2/12/07    joint Right Thumb   • ABDOMINOPLASTY  10/28/04   • GASTRIC BYPASS LAPAROSCOPIC  10/10/01   • KNEE ARTHROPLASTY TOTAL Bilateral 1/24/00    bilateral knees   • OTHER  9/99    tumor exc Right hip   • INGRID BY LAPAROSCOPY  9/97   • BLADDER SUSPENSION  12/93   • HYSTERECTOMY, TOTAL ABDOMINAL  5/93    rectal, bladder repair   • EAR MIDDLE EXPLORATION Left 4/90    Left   • BREAST BIOPSY Left 6/85   • TUBAL LIGATION  3/84   • ARTHROSCOPY, KNEE     • CHOLECYSTECTOMY     • COLON RESECTION     • KNEE ARTHROSCOPY Left 1983, 12/97    Left   • KNEE ARTHROSCOPY Right 6/88, 12/91, 11/97    Right   • OTHER  5/97, 4/05/05    vaginal repair   • OTHER SURGICAL PROCEDURE  4/06, 10/06    bilateral \"arm lift\"   • SLEEVE,ARLEEN VASO THIGH     • TONSILLECTOMY           Current Outpatient Medications   Medication Sig Dispense Refill   • losartan-hydrochlorothiazide (HYZAAR) 100-12.5 MG per tablet Take 1 Tab by mouth every day. 30 Tab 11   • ferrous sulfate 325 (65 Fe) MG tablet Take 325 mg by mouth every day.     • doxylamine (UNISOM) 25 MG Tab tablet Take 25 mg " by mouth every bedtime.     • polyethylene glycol/lytes (MIRALAX) Pack Take 17 g by mouth every day.     • fluticasone (FLONASE ALLERGY RELIEF) 50 MCG/ACT nasal spray Flonase Allergy Relief     • triamcinolone acetonide (KENALOG) 0.1 % Cream Apply  to affected area(s) as needed.     • Desvenlafaxine Succinate (PRISTIQ) 100 MG TABLET SR 24 HR Take 1 Tab by mouth every morning. Indications: Major Depressive Disorder     • fluvoxAMINE (LUVOX) 50 MG Tab Take 100 mg by mouth every morning. Indications: Major Depressive Disorder, Obsessive Compulsive Disorder     • folic acid (FOLATE) 400 MCG tablet Take 800 mcg by mouth every day.     • Coenzyme Q10 (COQ10 PO) Take 300 mg by mouth every day.     • Cyanocobalamin (VITAMIN B-12) 1000 MCG Tab Take 3,000 mcg by mouth every day.     • Multiple Vitamins-Minerals (MULTIVITAMIN ADULT PO) Take  by mouth every day.     • aspirin 81 MG tablet Take 81 mg by mouth every day.     • acetaminophen (TYLENOL) 500 MG Tab Take 500-1,000 mg by mouth every 6 hours as needed.     • aripiprazole (ABILIFY) 5 MG tablet Take 5 mg by mouth every morning. Indications: Major Depressive Disorder     • esomeprazole (NEXIUM) 40 MG delayed-release capsule Take 40 mg by mouth. 3 times/week  Indications: Gastroesophageal Reflux Disease with Current Symptoms     • alprazolam (XANAX) 0.5 MG TABS Take 0.5 mg by mouth as needed for Anxiety. Indications: Feeling Anxious     • Probiotic Product (PROBIOTIC DAILY PO) Take 1 Cap by mouth every day.       No current facility-administered medications for this visit.          Allergies   Allergen Reactions   • Ampicillin Shortness of Breath     Tolerated Cefazolin on 08/10/15   • Clindamycin Shortness of Breath and Rash     .   • Levaquin Shortness of Breath   • Penicillins Shortness of Breath and Rash     Tolerated Ancef 08/10/15   • Amlodipine      swelling   • Demerol Itching     Itching and rash     • Lisinopril Cough         Family History   Problem Relation Age  "of Onset   • Kidney Disease Mother    • Heart Failure Father          Social History     Socioeconomic History   • Marital status:      Spouse name: Not on file   • Number of children: Not on file   • Years of education: Not on file   • Highest education level: Not on file   Occupational History   • Not on file   Social Needs   • Financial resource strain: Not on file   • Food insecurity     Worry: Not on file     Inability: Not on file   • Transportation needs     Medical: Not on file     Non-medical: Not on file   Tobacco Use   • Smoking status: Never Smoker   • Smokeless tobacco: Never Used   Substance and Sexual Activity   • Alcohol use: Yes     Comment: reports 1 /week   • Drug use: No   • Sexual activity: Not on file   Lifestyle   • Physical activity     Days per week: Not on file     Minutes per session: Not on file   • Stress: Not on file   Relationships   • Social connections     Talks on phone: Not on file     Gets together: Not on file     Attends Buddhism service: Not on file     Active member of club or organization: Not on file     Attends meetings of clubs or organizations: Not on file     Relationship status: Not on file   • Intimate partner violence     Fear of current or ex partner: Not on file     Emotionally abused: Not on file     Physically abused: Not on file     Forced sexual activity: Not on file   Other Topics Concern   • Not on file   Social History Narrative   • Not on file         Physical Exam:  Ambulatory Vitals  /70 (BP Location: Right arm, Patient Position: Sitting, BP Cuff Size: Adult)   Pulse 60   Resp 16   Ht 1.626 m (5' 4\")   Wt 71.7 kg (158 lb)   SpO2 98%    Oxygen Therapy:  Pulse Oximetry: 98 %  BP Readings from Last 4 Encounters:   09/23/20 160/70   07/23/20 136/68   07/21/20 145/73   07/10/20 156/66       Weight/BMI: Body mass index is 27.12 kg/m².  Wt Readings from Last 4 Encounters:   09/23/20 71.7 kg (158 lb)   07/23/20 70.5 kg (155 lb 6.8 oz)   07/20/20 " 69.2 kg (152 lb 8.9 oz)   07/10/20 69.7 kg (153 lb 10.6 oz)       General: Well appearing and in no apparent distress  Head: atrumatic  Eyes: No conjunctival pallor   ENT: normal external appearance of nose and ears  Neck: JVD absent, carotid bruits absent  Lungs: respiratory sounds  normal, additional breath sounds absent  Heart: Regular rhythm,   No palpable thrills on palpation, aortic flow murmur, no rubs,   Lower extremity edema absent.   Pedal pulses normal  Abdomen: soft, non tender, non distended.  Extremities/MSK: no clubbing, no cyanosis  Neurological: normal orientation, Gait normal   Psychiatric: Appropriate affect, intact judgement and insight  Skin: Warm extremities        Lab Data Review:  No results found for: CHOLSTRLTOT, LDL, HDL, TRIGLYCERIDE    Lab Results   Component Value Date/Time    SODIUM 136 07/21/2020 05:07 AM    POTASSIUM 3.8 07/21/2020 05:07 AM    CHLORIDE 99 07/21/2020 05:07 AM    CO2 25 07/21/2020 05:07 AM    GLUCOSE 114 (H) 07/21/2020 05:07 AM    BUN 13 07/21/2020 05:07 AM    CREATININE 0.66 07/21/2020 05:07 AM    CREATININE 1.1 03/17/2008 03:05 PM     Lab Results   Component Value Date/Time    ALKPHOSPHAT 84 07/21/2020 05:07 AM    ASTSGOT 17 07/21/2020 05:07 AM    ALTSGPT 15 07/21/2020 05:07 AM    TBILIRUBIN 0.5 07/21/2020 05:07 AM      Lab Results   Component Value Date/Time    WBC 4.8 07/21/2020 05:07 AM     Echocardiogram 9/21/20:  CONCLUSIONS  Compared to the images of the study done 7/21/20 - there has been no   changes.   Left ventricular ejection fraction is visually estimated to be 65%.  Normal regional wall motion.  Known TAVR aortic valve that is functioning normally with normal   transvalvular gradients.  Vmax is 2.28  m/s.  Right ventricular systolic pressure is estimated to be 23 mmHg.    Cath 7/10/2020  Findings:  1.  Left main coronary artery:  Normal.  2.  Left anterior descending artery:  Normal.   3.  Left circumflex coronary artery:  Normal.    4.  Right coronary  artery:  Normal.  This is a right dominant system.    Impression and Plan:  75-year-old female patient with  1.  Severe aortic stenosis status post TAVR, NYHA class II stage C  2.  Hypertension uncontrolled    She still has dyspnea on exertion.  Advised her to increase exercise.  Echocardiogram looks normal no complications.  Her blood pressure could also be contributing to dyspnea on exertion.  I will increase her losartan and hydrochlorothiazide.  Advised to report me back in 2 weeks regarding blood pressures.  Stop Plavix.    Mukesh STOUT  Interventional cardiologist  [facility]  [address] [address]  Phone: [phone]  Fax: [phone]

## 2020-09-23 NOTE — TELEPHONE ENCOUNTER
AK      Hello,patient has questions regarding medication. She will like a call back at  [phone]

## 2020-09-24 NOTE — TELEPHONE ENCOUNTER
Pt explains that she thought that two medications for her blood pressure were going to be prescribed after today's visit but when she went to the pharmacy there was only one.     Explained to pt that the losartan-hctz 100mg-12.5mg is a combination pill and explained how each medication works and how they work together. Pt states understanding and appreciated the explanation. She will call back or mychart in 1 week with BP readings.

## 2020-10-06 ENCOUNTER — TELEPHONE (OUTPATIENT)
Dept: CARDIOLOGY | Facility: MEDICAL CENTER | Age: 75
End: 2020-10-06

## 2020-10-06 NOTE — TELEPHONE ENCOUNTER
AK/naresh    Pt calling with b/p log/history that AK requested.  She is requesting permission to fax it directly to your nurses station.    Please call Tiffany .

## 2020-10-07 NOTE — TELEPHONE ENCOUNTER
You  Mukesh Reid M.D. Yesterday (11:12 AM)     Pt started on Losartan Potassium-HCTZ 100-12.5 MG on 9/23. BP log is in media. Any additional med changes?     Mukesh Reid M.D.  You 1 hour ago (12:52 PM)     Keep the same dose. Hope she is feeling better      LVM with pt at [phone] notifying that BP log was reviewed, and no medication changes are needed at this time.

## 2020-10-09 ENCOUNTER — DOCUMENTATION (OUTPATIENT)
Dept: CARDIOLOGY | Facility: MEDICAL CENTER | Age: 75
End: 2020-10-09

## 2020-10-09 NOTE — PROGRESS NOTES
Valve Program Functional Assessment: 20 1 month post-op    KCCQ12   1a) Showering/bathin  1b) Walking 1 block on ground: 4  1c) Hurrying or joggin  2) Swelling: 3  3) Fatigue: 4  4) Shortness of breath: 3  5) Sleep sitting up: 4  6) Limited enjoyment of life: 4  7) Spend the rest of your life with HF: 3  8a) Hobbies, recreational activities:4  8b) Working or doing household chores:4  8c) Visiting family or friends: 4    5 meter walk test  1) __5.49____ s/5m  2) __4.46____ s/5m  3) __8.75____ s/5m     Strength   1) _16_____ kg  2) _15_____ kg  3) _14_____ kg    SAVAGE ADLs  Patient independently preforms...   - Bathing: Yes   - Dressing: Yes   - Toileting: Yes   - Transferring: Yes   - Continence: Yes   - Feeding: Yes     Living Situation  Patient lives:with spouse    Mobility Aids   Patient uses:  none

## 2020-11-10 ENCOUNTER — TELEPHONE (OUTPATIENT)
Dept: CARDIOLOGY | Facility: MEDICAL CENTER | Age: 75
End: 2020-11-10

## 2020-11-10 NOTE — TELEPHONE ENCOUNTER
Received call from Xi at [facility]. Pt is currently there for a cleaning, but just told them that she had a TAVR in July. She already takes pre med prior to her appts and took Keflex 500mg. Xi would be ok with a verbal clearance. D/w SC and informed that pt already took pre med. Per SC, ok to proceed. Called Xi back at [phone] and informed of this. She said she will still probably fax over a form just for their records.

## 2020-11-11 NOTE — TELEPHONE ENCOUNTER
The clearance form was faxed back to us from [facility] due to some missing information on the form. Form was completed and faxed back by Marissa WILLAMS to [phone]

## 2020-12-09 ENCOUNTER — TELEPHONE (OUTPATIENT)
Dept: CARDIOLOGY | Facility: MEDICAL CENTER | Age: 75
End: 2020-12-09

## 2020-12-09 NOTE — TELEPHONE ENCOUNTER
AK    NM: Tiffany Louis   PH: [phone]   PT NM: Missy,    : 45   RE: Would like to see about getting a  referral to a radiologist.   --------------------------------------  Message History  Account: [id number]  Taken:  Wed 09-Dec-2020  1:45p NR  Serial#: [id number]

## 2020-12-10 ENCOUNTER — HOSPITAL ENCOUNTER (OUTPATIENT)
Dept: RADIOLOGY | Facility: MEDICAL CENTER | Age: 75
End: 2020-12-10
Attending: INTERNAL MEDICINE
Payer: MEDICARE

## 2020-12-10 DIAGNOSIS — C50.912 MALIGNANT NEOPLASM OF LEFT FEMALE BREAST, UNSPECIFIED ESTROGEN RECEPTOR STATUS, UNSPECIFIED SITE OF BREAST (HCC): ICD-10-CM

## 2020-12-10 PROCEDURE — 71260 CT THORAX DX C+: CPT

## 2020-12-10 PROCEDURE — 700117 HCHG RX CONTRAST REV CODE 255: Performed by: INTERNAL MEDICINE

## 2020-12-10 RX ADMIN — IOHEXOL 75 ML: 350 INJECTION, SOLUTION INTRAVENOUS at 14:28

## 2020-12-10 NOTE — TELEPHONE ENCOUNTER
Pt is requesting a referral to a neurologist, not a radiologist. She explains that she can hardly get up out of her chair and that her legs are really weak. She is also having balance problems. She said her therapist recommended that she see a neurologist. She does not think this is related to her heart or TAVR. She has no cardiac complaints.     Advised that she contact her PCP however she said she no longer has one as they keep moving out of [place]. Encouraged pt to contact [facility] to schedule with a PCP. Also recommended that PT may be most appropriate for symptoms. However, pt would like to still try to see a neurologist without an order.

## 2020-12-14 NOTE — TELEPHONE ENCOUNTER
PH: [phone]   PT NM: MissyTiffany   : 45   RE: Calling to get a referral to Dr Penny.     --------------------------------------  Message History  Account: [id number]  Taken:  Mon 14-Dec-2020 12:09p [facility]  Serial#: [id number]

## 2020-12-15 ENCOUNTER — TELEPHONE (OUTPATIENT)
Dept: SCHEDULING | Facility: IMAGING CENTER | Age: 75
End: 2020-12-15

## 2021-01-04 ENCOUNTER — OFFICE VISIT (OUTPATIENT)
Dept: MEDICAL GROUP | Facility: PHYSICIAN GROUP | Age: 76
End: 2021-01-04
Payer: MEDICARE

## 2021-01-04 VITALS
OXYGEN SATURATION: 97 % | BODY MASS INDEX: 25.61 KG/M2 | DIASTOLIC BLOOD PRESSURE: 80 MMHG | TEMPERATURE: 98.2 F | RESPIRATION RATE: 12 BRPM | HEIGHT: 64 IN | HEART RATE: 79 BPM | SYSTOLIC BLOOD PRESSURE: 122 MMHG | WEIGHT: 150 LBS

## 2021-01-04 DIAGNOSIS — R26.81 UNSTEADY GAIT: ICD-10-CM

## 2021-01-04 DIAGNOSIS — R29.6 FREQUENT FALLS: ICD-10-CM

## 2021-01-04 DIAGNOSIS — N95.1 MENOPAUSAL STATE: ICD-10-CM

## 2021-01-04 DIAGNOSIS — Z95.2 S/P TAVR (TRANSCATHETER AORTIC VALVE REPLACEMENT): ICD-10-CM

## 2021-01-04 DIAGNOSIS — Z12.11 SCREENING FOR COLORECTAL CANCER: ICD-10-CM

## 2021-01-04 DIAGNOSIS — Z12.12 SCREENING FOR COLORECTAL CANCER: ICD-10-CM

## 2021-01-04 DIAGNOSIS — Z78.0 POSTMENOPAUSAL STATUS (AGE-RELATED) (NATURAL): ICD-10-CM

## 2021-01-04 PROCEDURE — 99204 OFFICE O/P NEW MOD 45 MIN: CPT | Performed by: FAMILY MEDICINE

## 2021-01-04 RX ORDER — CELECOXIB 200 MG/1
CAPSULE ORAL
COMMUNITY
End: 2021-02-04

## 2021-01-04 RX ORDER — DESVENLAFAXINE 100 MG/1
TABLET, EXTENDED RELEASE ORAL
COMMUNITY
End: 2021-02-04

## 2021-01-04 RX ORDER — CELECOXIB 200 MG/1
200 CAPSULE ORAL
COMMUNITY
Start: 2020-11-20 | End: 2021-03-05

## 2021-01-04 RX ORDER — TOLTERODINE 4 MG/1
CAPSULE, EXTENDED RELEASE ORAL
COMMUNITY
End: 2021-02-04

## 2021-01-04 RX ORDER — TOLTERODINE 4 MG/1
4 CAPSULE, EXTENDED RELEASE ORAL
COMMUNITY
Start: 2020-12-04 | End: 2021-02-04

## 2021-01-04 RX ORDER — NITROFURANTOIN 25; 75 MG/1; MG/1
CAPSULE ORAL
COMMUNITY
End: 2021-02-04

## 2021-01-04 RX ORDER — NITROFURANTOIN 25; 75 MG/1; MG/1
100 CAPSULE ORAL
COMMUNITY
Start: 2020-11-20 | End: 2021-02-04

## 2021-01-04 ASSESSMENT — ENCOUNTER SYMPTOMS
PSYCHIATRIC NEGATIVE: 1
GASTROINTESTINAL NEGATIVE: 1
COUGH: 0
CHILLS: 0
CARDIOVASCULAR NEGATIVE: 1
CONSTITUTIONAL NEGATIVE: 1
NEUROLOGICAL NEGATIVE: 1
TINGLING: 0
DIZZINESS: 0
HEMOPTYSIS: 0
MYALGIAS: 0
DOUBLE VISION: 0
BRUISES/BLEEDS EASILY: 0
BLURRED VISION: 0
HEARTBURN: 0
EYES NEGATIVE: 1
PALPITATIONS: 0
FALLS: 1
NAUSEA: 0
HEADACHES: 0
DEPRESSION: 0
FEVER: 0
RESPIRATORY NEGATIVE: 1

## 2021-01-04 ASSESSMENT — PATIENT HEALTH QUESTIONNAIRE - PHQ9: CLINICAL INTERPRETATION OF PHQ2 SCORE: 0

## 2021-01-04 ASSESSMENT — FIBROSIS 4 INDEX: FIB4 SCORE: 1.78

## 2021-01-04 NOTE — PROGRESS NOTES
"Subjective:      Tiffany Louis is a 75 y.o. female who presents with Loss Of Balance (has fallin 3 time past month no injury), Establish Care, and Seasonal Allergies (med refill flonase.)            1. Screening for colorectal cancer    - REFERRAL TO GI FOR COLONOSCOPY    2. Postmenopausal status (age-related) (natural)    - DS-BONE DENSITY STUDY (DEXA)    3. Menopausal state    - DS-BONE DENSITY STUDY (DEXA)    4. Frequent falls  Three falls over past month where she feels that her legs don't work right and trips. No sequelae from falls. Today gait in office normal for her pmh of joints replaced. Neuro exam otherwise normal  - REFERRAL TO NEUROLOGY    5. Unsteady gait    - REFERRAL TO NEUROLOGY    6. S/P TAVR (transcatheter aortic valve replacement)  The patient's current medical issue is well controlled on the current therapy with no new symptoms or worsening      Past Medical History:  No date: Anemia  No date: Anesthesia      Comment:  \"very low blood pressure\"  No date: Aortic valve stenosis, severe  No date: Arthritis      Comment:  osteo, \"all over\"  No date: Bilateral carotid artery stenosis  : Breast cancer ([facility])      Comment:  left  No date: Breath shortness      Comment:  from aortic stenosis  No date: Cancer ([facility])      Comment:  skin  2015: Cancer ([facility])      Comment:  breast  No date: Dental disorder      Comment:  upper implants front teeth  No date: Depression  No date: GERD (gastroesophageal reflux disease)  No date: Heart murmur  No date: Joint replacement      Comment:  bilateral knees and right hip  No date: Nasal drainage  No date: Obesity  No date: Osteoporosis  No date: Psychiatric problem      Comment:  anxiety/depression  06/11/2020: Renal disorder      Comment:  kidney stone  No date: Restless leg syndrome  No date: Sleep apnea      Comment:  CPAP  No date: Snoring  10/2016: Umbilical hernia  No date: Unspecified urinary incontinence  6/21/2017: Urinary bladder disorder      " "Comment:  reports freq infections but none in the past year; on                ABX, + bacteria in \"gut\"  Past Surgical History:  7/20/2020: TRANSCATHETER AORTIC VALVE REPLACEMENT      Comment:  Procedure: REPLACEMENT, AORTIC VALVE, TRANSCATHETER;                 Surgeon: Mukesh Reid M.D.;  Location: [facility];  Service: Cardiac  7/20/2020: NEHA      Comment:  Procedure: ECHOCARDIOGRAM, TRANSESOPHAGEAL-;  Surgeon:                Mukesh Reid M.D.;  Location: [facility];  Service: Cardiac  06/2020: LUNG BIOPSY OPEN; Right  6/27/2017: LATISSIMUS FLAP; Left      Comment:  Procedure: LATISSIMUS FLAP W/INSERTION OF IMPLANT;                 Surgeon: Johnny Flannery M.D.;  Location: [facility];  Service:   6/27/2017: BREAST IMPLANT REMOVAL; Left      Comment:  Procedure: BREAST IMPLANT REMOVAL;  Surgeon: Johnny Flannery M.D.;  Location: [facility];                 Service:   4/13/2017: CAPSULECTOMY      Comment:  and debridement left breast  12/28/2016: BOWEL RESECTION  11/29/2016: BREAST RECONSTRUCTION; Bilateral      Comment:  Procedure: BREAST RECONSTRUCTION;  Surgeon: Johnny Flannery M.D.;  Location: [facility];                 Service:   11/29/2016: LATISSIMUS FLAP; Right      Comment:  Procedure: LATISSIMUS FLAP W/ IMPLANT;  Surgeon: Johnny Flannery M.D.;  Location: [facility];                 Service:   11/29/2016: TISSUE EXPANDER PLACE/REMOVE; Left      Comment:  Procedure: TISSUE EXPANDER REMOVE - REMOVAL AND                INSERTION OF IMPLANT;  Surgeon: Johnny Flannery M.D.;                 Location: [facility];  Service:   11/29/2016: CAPSULECTOMY; Bilateral      Comment:  Procedure: CAPSULECTOMY;  Surgeon: Johnny Flannery M.D.;  Location: [facility];  Service:   7/2016: " OTHER SURGICAL PROCEDURE; Right      Comment:  debridement and breast expander placement  1/6/2016: BREAST IMPLANT REMOVAL; Right      Comment:  Procedure: BREAST IMPLANT REMOVAL, placement of drain;                 Surgeon: Jon Leblanc M.D.;  Location: [facility];  Service:   1/2016 : OTHER SURGICAL PROCEDURE; Right      Comment:  second debridement right breast  9/8/2015: CATH PLACEMENT; Right      Comment:  Procedure: CATH PLACEMENT PORT;  Surgeon: Vargas Hyde M.D.;  Location: [facility];                 Service:   9/8/2015: OTHER SURGICAL PROCEDURE      Comment:  Port placement for chemo  8/10/2015: MASTECTOMY MODIFIED RADICAL; Left      Comment:  Procedure: MASTECTOMY MODIFIED RADICAL;  Surgeon: Vargas Hyde M.D.;  Location: [facility];                 Service:   8/10/2015: MASTECTOMY; Right      Comment:  Procedure: MASTECTOMY SIMPLE;  Surgeon: Vargas Hyde M.D.;  Location: [facility];  Service:   8/10/2015: NODE BIOPSY SENTINEL; Left      Comment:  Procedure: NODE BIOPSY SENTINEL;  Surgeon: Vargas Hyde M.D.;  Location: [facility];                 Service:   8/10/2015: BREAST RECONSTRUCTION; Bilateral      Comment:  Procedure: BREAST RECONSTRUCTION VIA;  Surgeon: Johnny Flannery M.D.;  Location: [facility];                 Service:   8/10/2015: TISSUE EXPANDER PLACE/REMOVE; Bilateral      Comment:  Procedure: TISSUE EXPANDER PLACE/REMOVE & POSSIBLE                ALLODERM;  Surgeon: Johnny Flannery M.D.;  Location:                [facility];  Service:   2009: ROTATOR CUFF REPAIR; Right      Comment:  right  2008: HIP ARTHROPLASTY TOTAL; Right      Comment:  Hip Replacement, Total, right  2/12/07: HAND SURGERY; Right      Comment:  joint Right Thumb  10/28/04: ABDOMINOPLASTY  10/10/01: GASTRIC BYPASS  "LAPAROSCOPIC  1/24/00: KNEE ARTHROPLASTY TOTAL; Bilateral      Comment:  bilateral knees  9/99: OTHER      Comment:  tumor exc Right hip  9/97: INGRID BY LAPAROSCOPY  12/93: BLADDER SUSPENSION  5/93: HYSTERECTOMY, TOTAL ABDOMINAL      Comment:  rectal, bladder repair  4/90: EAR MIDDLE EXPLORATION; Left      Comment:  Left  6/85: BREAST BIOPSY; Left  3/84: TUBAL LIGATION  No date: ARTHROSCOPY, KNEE  No date: CHOLECYSTECTOMY  No date: COLON RESECTION  1983, 12/97: KNEE ARTHROSCOPY; Left      Comment:  Left  6/88, 12/91, 11/97: KNEE ARTHROSCOPY; Right      Comment:  Right  5/97, 4/05/05: OTHER      Comment:  vaginal repair  4/06, 10/06: OTHER SURGICAL PROCEDURE      Comment:  bilateral \"arm lift\"  No date: SLEEVE,ARLEEN VASO THIGH  No date: TONSILLECTOMY  Social History    Tobacco Use      Smoking status: Never Smoker      Smokeless tobacco: Never Used    Alcohol use: Yes      Comment: reports 1 /week    Drug use: No    Review of patient's family history indicates:  Problem: Kidney Disease      Relation: Mother          Age of Onset: (Not Specified)  Problem: Heart Failure      Relation: Father          Age of Onset: (Not Specified)      Current Outpatient Medications: •  celecoxib (CELEBREX) 200 MG Cap, Take 200 mg by mouth., Disp: , Rfl: •  tolterodine ER (DETROL LA) 4 MG CAPSULE SR 24 HR, tolterodine ER 4 mg capsule,extended release 24 hr  take 1 capsule by mouth once daily, Disp: , Rfl: •  Desvenlafaxine Succinate 100 MG TABLET SR 24 HR, desvenlafaxine succinate  mg tablet,extended release 24 hr  take 1 tablet by mouth once daily for depression, Disp: , Rfl: •  ferrous sulfate 325 (65 Fe) MG tablet, Take 325 mg by mouth every day., Disp: , Rfl: •  doxylamine (UNISOM) 25 MG Tab tablet, Take 25 mg by mouth every bedtime., Disp: , Rfl: •  polyethylene glycol/lytes (MIRALAX) Pack, Take 17 g by mouth every day., Disp: , Rfl: •  fluticasone (FLONASE ALLERGY RELIEF) 50 MCG/ACT nasal spray, Flonase Allergy Relief, Disp: " , Rfl: •  triamcinolone acetonide (KENALOG) 0.1 % Cream, Apply  to affected area(s) as needed., Disp: , Rfl: •  fluvoxAMINE (LUVOX) 50 MG Tab, Take 100 mg by mouth every morning. Indications: Major Depressive Disorder, Obsessive Compulsive Disorder, Disp: , Rfl: •  folic acid (FOLATE) 400 MCG tablet, Take 800 mcg by mouth every day., Disp: , Rfl: •  Coenzyme Q10 (COQ10 PO), Take 300 mg by mouth every day., Disp: , Rfl: •  Cyanocobalamin (VITAMIN B-12) 1000 MCG Tab, Take 3,000 mcg by mouth every day., Disp: , Rfl: •  Multiple Vitamins-Minerals (MULTIVITAMIN ADULT PO), Take  by mouth every day., Disp: , Rfl: •  aspirin 81 MG tablet, Take 81 mg by mouth every day., Disp: , Rfl: •  aripiprazole (ABILIFY) 5 MG tablet, Take 5 mg by mouth every morning. Indications: Major Depressive Disorder, Disp: , Rfl: •  esomeprazole (NEXIUM) 40 MG delayed-release capsule, Take 40 mg by mouth. 3 times/week  Indications: Gastroesophageal Reflux Disease with Current Symptoms, Disp: , Rfl: •  alprazolam (XANAX) 0.5 MG TABS, Take 0.5 mg by mouth as needed for Anxiety. Indications: Feeling Anxious, Disp: , Rfl: •  Probiotic Product (PROBIOTIC DAILY PO), Take 1 Cap by mouth every day., Disp: , Rfl: •  celecoxib (CELEBREX) 200 MG Cap, celecoxib 200 mg capsule, Disp: , Rfl: •  influenza Vac High-Dose Quad (FLUZONE) 0.7 ML Suspension Prefilled Syringe injection, Fluzone High-Dose Quad 2020-21 (PF) 240 mcg/0.7 mL IM syringe, Disp: , Rfl: •  Mirabegron ER 25 MG TABLET SR 24 HR, Myrbetriq 25 mg tablet,extended release  Take 1 tablet every day by oral route for 30 days., Disp: , Rfl: •  Mirabegron ER 50 MG TABLET SR 24 HR, Myrbetriq 50 mg tablet,extended release  Take 1 tablet every day by oral route as directed for 90 days., Disp: , Rfl: •  nitrofurantoin (MACROBID) 100 MG Cap, nitrofurantoin monohydrate/macrocrystals 100 mg capsule, Disp: , Rfl: •  nitrofurantoin (MACROBID) 100 MG Cap, Take 100 mg by mouth., Disp: , Rfl: •  tolterodine ER (DETROL  "LA) 4 MG CAPSULE SR 24 HR, Take 4 mg by mouth., Disp: , Rfl: •  Losartan Potassium-HCTZ (HYZAAR PO), Hyzaar, Disp: , Rfl: •  losartan-hydrochlorothiazide (HYZAAR) 100-12.5 MG per tablet, Take 1 Tab by mouth every day. (Patient not taking: Reported on 1/4/2021), Disp: 30 Tab, Rfl: 11•  Desvenlafaxine Succinate (PRISTIQ) 100 MG TABLET SR 24 HR, Take 1 Tab by mouth every morning. Indications: Major Depressive Disorder, Disp: , Rfl: •  acetaminophen (TYLENOL) 500 MG Tab, Take 500-1,000 mg by mouth every 6 hours as needed., Disp: , Rfl:     Patient was instructed on the use of medications, either prescriptions or OTC and informed on when the appropriate follow up time period should be. In addition, patient was also instructed that should any acute worsening occur that they should notify this clinic asap or call 911.          Review of Systems   Constitutional: Negative.  Negative for chills and fever.   HENT: Negative.  Negative for hearing loss.    Eyes: Negative.  Negative for blurred vision and double vision.   Respiratory: Negative.  Negative for cough and hemoptysis.    Cardiovascular: Negative.  Negative for chest pain and palpitations.   Gastrointestinal: Negative.  Negative for heartburn and nausea.   Genitourinary: Negative.  Negative for dysuria.   Musculoskeletal: Positive for falls. Negative for myalgias.   Skin: Negative.  Negative for rash.   Neurological: Negative.  Negative for dizziness, tingling and headaches.   Endo/Heme/Allergies: Negative.  Does not bruise/bleed easily.   Psychiatric/Behavioral: Negative.  Negative for depression and suicidal ideas.   All other systems reviewed and are negative.         Objective:     /80 (BP Location: Right arm, Patient Position: Sitting, BP Cuff Size: Adult)   Pulse 79   Temp 36.8 °C (98.2 °F) (Temporal)   Resp 12   Ht 1.626 m (5' 4\")   Wt 68 kg (150 lb)   SpO2 97%   BMI 25.75 kg/m²      Physical Exam  Vitals signs and nursing note reviewed. "   Constitutional:       General: She is not in acute distress.     Appearance: She is well-developed. She is not diaphoretic.   HENT:      Head: Normocephalic and atraumatic.      Mouth/Throat:      Pharynx: No oropharyngeal exudate.   Eyes:      Pupils: Pupils are equal, round, and reactive to light.   Cardiovascular:      Rate and Rhythm: Normal rate and regular rhythm.      Heart sounds: Murmur present. Systolic murmur present with a grade of 1/6. No friction rub. No gallop.    Pulmonary:      Effort: Pulmonary effort is normal. No respiratory distress.      Breath sounds: Normal breath sounds. No wheezing or rales.   Chest:      Chest wall: No tenderness.   Neurological:      Mental Status: She is alert and oriented to person, place, and time.   Psychiatric:         Behavior: Behavior normal.         Thought Content: Thought content normal.         Judgment: Judgment normal.                 Assessment/Plan:        1. Screening for colorectal cancer    - REFERRAL TO GI FOR COLONOSCOPY    2. Postmenopausal status (age-related) (natural)    - DS-BONE DENSITY STUDY (DEXA)    3. Menopausal state    - DS-BONE DENSITY STUDY (DEXA)    4. Frequent falls    - REFERRAL TO NEUROLOGY    5. Unsteady gait    - REFERRAL TO NEUROLOGY    6. S/P TAVR (transcatheter aortic valve replacement)

## 2021-01-04 NOTE — LETTER
[facility]  Flip Aparicio M.D.  [address]  [facility] [address]  Fax: [phone]   Authorization for Release/Disclosure of   Protected Health Information   Name: NAPOLEON PETERSON : 1945 SSN: [id number]   Address: [address] [place] [id number] Phone:    [phone] (home)    I authorize the entity listed below to release/disclose the PHI below to:   [facility]/Flip Aparicio M.D. and Flip Aparicio M.D.   Provider or Entity Name: Dr Mcnulty  ([place])     Address   [place], [place]     Phone: [phone]      Fax:     Reason for request: continuity of care   Information to be released:    [  ] LAST COLONOSCOPY,  including any PATH REPORT and follow-up  [  ] LAST FIT/COLOGUARD RESULT [  ] LAST DEXA  [  ] LAST MAMMOGRAM  [  ] LAST PAP  [  ] LAST LABS [  ] RETINA EXAM REPORT  [  ] IMMUNIZATION RECORDS  [x] Release all info      [  ] Check here and initial the line next to each item to release ALL health information INCLUDING  _____ Care and treatment for drug and / or alcohol abuse  _____ HIV testing, infection status, or AIDS  _____ Genetic Testing    DATES OF SERVICE OR TIME PERIOD TO BE DISCLOSED: _____________  I understand and acknowledge that:  * This Authorization may be revoked at any time by you in writing, except if your health information has already been used or disclosed.  * Your health information that will be used or disclosed as a result of you signing this authorization could be re-disclosed by the recipient. If this occurs, your re-disclosed health information may no longer be protected by State or Federal laws.  * You may refuse to sign this Authorization. Your refusal will not affect your ability to obtain treatment.  * This Authorization becomes effective upon signing and will  on (date) __________.      If no date is indicated, this Authorization will  one (1) year from the signature date.    Name: Napoleon Bautista  Missy    Signature:   Date:     1/4/2021       PLEASE FAX REQUESTED RECORDS BACK TO: [phone]

## 2021-01-07 ENCOUNTER — HOSPITAL ENCOUNTER (OUTPATIENT)
Dept: RADIOLOGY | Facility: MEDICAL CENTER | Age: 76
End: 2021-01-07
Attending: FAMILY MEDICINE
Payer: MEDICARE

## 2021-01-07 PROCEDURE — 77080 DXA BONE DENSITY AXIAL: CPT

## 2021-01-15 ENCOUNTER — TELEPHONE (OUTPATIENT)
Dept: MEDICAL GROUP | Facility: PHYSICIAN GROUP | Age: 76
End: 2021-01-15

## 2021-01-15 NOTE — TELEPHONE ENCOUNTER
Pt called stating that she is getting the covid vaccine on Tuesday and she states that she is allergic to several antibiotics. She would like to make sure that she is okay to still get the vaccine.

## 2021-01-18 RX ORDER — FLUTICASONE PROPIONATE 50 MCG
SPRAY, SUSPENSION (ML) NASAL
Qty: 16 G | Refills: 1 | Status: SHIPPED | OUTPATIENT
Start: 2021-01-18 | End: 2021-08-12 | Stop reason: SDUPTHER

## 2021-02-04 ENCOUNTER — OFFICE VISIT (OUTPATIENT)
Dept: NEUROLOGY | Facility: MEDICAL CENTER | Age: 76
End: 2021-02-04
Attending: PSYCHIATRY & NEUROLOGY
Payer: MEDICARE

## 2021-02-04 VITALS
BODY MASS INDEX: 25.1 KG/M2 | HEIGHT: 64 IN | DIASTOLIC BLOOD PRESSURE: 74 MMHG | SYSTOLIC BLOOD PRESSURE: 124 MMHG | OXYGEN SATURATION: 98 % | HEART RATE: 64 BPM | TEMPERATURE: 98.2 F | WEIGHT: 147 LBS
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
## 2021-02-04 PROCEDURE — 99203 OFFICE O/P NEW LOW 30 MIN: CPT | Performed by: PSYCHIATRY & NEUROLOGY

## 2021-02-04 PROCEDURE — 99211 OFF/OP EST MAY X REQ PHY/QHP: CPT | Performed by: PSYCHIATRY & NEUROLOGY

## 2021-02-04 RX ORDER — ESOMEPRAZOLE MAGNESIUM 40 MG/1
CAPSULE, DELAYED RELEASE ORAL
Qty: 90 CAP | Refills: 1 | Status: SHIPPED | OUTPATIENT
Start: 2021-02-04 | End: 2021-03-05

## 2021-02-04 RX ORDER — ESOMEPRAZOLE MAGNESIUM 40 MG/1
40 CAPSULE, DELAYED RELEASE ORAL
Qty: 30 CAP | Status: CANCELLED | OUTPATIENT
Start: 2021-02-04

## 2021-02-04 ASSESSMENT — FIBROSIS 4 INDEX: FIB4 SCORE: 1.78

## 2021-02-04 NOTE — PROGRESS NOTES
"[facility] NEUROLOGY  NEW PATIENT VISIT    Referral source: Flip Aparicio MD    CC: \"frequent falls, unsteady gait\"    HISTORY OF ILLNESS:  Tiffany Louis is a 75 y.o. woman with a history most notable for breast cancer and R[place]MOND.  Today, she was unaccompanied, and she provided the following history:    11/2020:  Tiffany fell.  She was at home at the time.  She was walking around the house, and she \"lost her balance.\"  She didn't feel \"dizziness.\"  She had difficulty getting up because her upper body isn't strong enough.  Her  had to help her.    1/2021:  Tiffany was opening a restaurant door.  She pushed on the door.  It was very windy.  Her leg got caught in the door, and she fell on the sidewalk.    Tiffany has had about 5 similar falls in total.    She didn't lose awareness with any of the falls.    Tiffany has numbness in her feet.  There is weakness in both arms.  She feels like her legs have full strength.  There have not been any changes in her hearing or tinnitus.  Tiffany's eye sight is becoming poor.  There are no tremors.  She doesn't consume alcohol.    MEDICAL AND SURGICAL HISTORY:  Past Medical History:   Diagnosis Date   • Anemia    • Anesthesia     \"very low blood pressure\"   • Aortic valve stenosis, severe    • Arthritis     osteo, \"all over\"   • Bilateral carotid artery stenosis    • Breast cancer ([facility])     left   • Breath shortness     from aortic stenosis   • Cancer ([facility])     skin   • Cancer ([facility]) 2015    breast   • Dental disorder     upper implants front teeth   • Depression    • GERD (gastroesophageal reflux disease)    • Heart murmur    • Joint replacement     bilateral knees and right hip   • Nasal drainage    • Obesity    • Osteoporosis    • Psychiatric problem     anxiety/depression   • Renal disorder 06/11/2020    kidney stone   • Restless leg syndrome    • Sleep apnea     CPAP   • Snoring    • Umbilical hernia 10/2016   • Unspecified urinary incontinence    • Urinary " "bladder disorder 6/21/2017    reports freq infections but none in the past year; on ABX, + bacteria in \"gut\"     Past Surgical History:   Procedure Laterality Date   • TRANSCATHETER AORTIC VALVE REPLACEMENT  7/20/2020    Procedure: REPLACEMENT, AORTIC VALVE, TRANSCATHETER;  Surgeon: Mukesh Reid M.D.;  Location: [facility];  Service: Cardiac   • NEHA  7/20/2020    Procedure: ECHOCARDIOGRAM, TRANSESOPHAGEAL-;  Surgeon: Mukesh Reid M.D.;  Location: [facility];  Service: Cardiac   • LUNG BIOPSY OPEN Right 06/2020   • LATISSIMUS FLAP Left 6/27/2017    Procedure: LATISSIMUS FLAP W/INSERTION OF IMPLANT;  Surgeon: Johnny Flannery M.D.;  Location: [facility];  Service:    • BREAST IMPLANT REMOVAL Left 6/27/2017    Procedure: BREAST IMPLANT REMOVAL;  Surgeon: Johnny Flannery M.D.;  Location: [facility];  Service:    • CAPSULECTOMY  4/13/2017    and debridement left breast   • BOWEL RESECTION  12/28/2016   • BREAST RECONSTRUCTION Bilateral 11/29/2016    Procedure: BREAST RECONSTRUCTION;  Surgeon: Johnny Flannery M.D.;  Location: [facility];  Service:    • LATISSIMUS FLAP Right 11/29/2016    Procedure: LATISSIMUS FLAP W/ IMPLANT;  Surgeon: Johnny Flannery M.D.;  Location: [facility];  Service:    • TISSUE EXPANDER PLACE/REMOVE Left 11/29/2016    Procedure: TISSUE EXPANDER REMOVE - REMOVAL AND INSERTION OF IMPLANT;  Surgeon: Johnny Flannery M.D.;  Location: [facility];  Service:    • CAPSULECTOMY Bilateral 11/29/2016    Procedure: CAPSULECTOMY;  Surgeon: Johnny Flannery M.D.;  Location: [facility];  Service:    • OTHER SURGICAL PROCEDURE Right 7/2016    debridement and breast expander placement   • BREAST IMPLANT REMOVAL Right 1/6/2016    Procedure: BREAST IMPLANT REMOVAL, placement of drain;  Surgeon: Jon Leblanc M.D.;  Location: [facility];  Service:    • OTHER SURGICAL " "PROCEDURE Right 1/2016     second debridement right breast   • CATH PLACEMENT Right 9/8/2015    Procedure: CATH PLACEMENT PORT;  Surgeon: Vargas Hyde M.D.;  Location: [facility];  Service:    • OTHER SURGICAL PROCEDURE  9/8/2015    Port placement for chemo   • MASTECTOMY MODIFIED RADICAL Left 8/10/2015    Procedure: MASTECTOMY MODIFIED RADICAL;  Surgeon: Vargas Hyde M.D.;  Location: [facility];  Service:    • MASTECTOMY Right 8/10/2015    Procedure: MASTECTOMY SIMPLE;  Surgeon: Vargas Hyde M.D.;  Location: [facility];  Service:    • NODE BIOPSY SENTINEL Left 8/10/2015    Procedure: NODE BIOPSY SENTINEL;  Surgeon: Vargas Hyde M.D.;  Location: [facility];  Service:    • BREAST RECONSTRUCTION Bilateral 8/10/2015    Procedure: BREAST RECONSTRUCTION VIA;  Surgeon: Johnny Flannery M.D.;  Location: [facility];  Service:    • TISSUE EXPANDER PLACE/REMOVE Bilateral 8/10/2015    Procedure: TISSUE EXPANDER PLACE/REMOVE & POSSIBLE ALLODERM;  Surgeon: Johnny Flannery M.D.;  Location: [facility];  Service:    • ROTATOR CUFF REPAIR Right 2009    right   • HIP ARTHROPLASTY TOTAL Right 2008    Hip Replacement, Total, right   • HAND SURGERY Right 2/12/07    joint Right Thumb   • ABDOMINOPLASTY  10/28/04   • GASTRIC BYPASS LAPAROSCOPIC  10/10/01   • KNEE ARTHROPLASTY TOTAL Bilateral 1/24/00    bilateral knees   • OTHER  9/99    tumor exc Right hip   • INGRID BY LAPAROSCOPY  9/97   • BLADDER SUSPENSION  12/93   • HYSTERECTOMY, TOTAL ABDOMINAL  5/93    rectal, bladder repair   • EAR MIDDLE EXPLORATION Left 4/90    Left   • BREAST BIOPSY Left 6/85   • TUBAL LIGATION  3/84   • ARTHROSCOPY, KNEE     • CHOLECYSTECTOMY     • COLON RESECTION     • KNEE ARTHROSCOPY Left 1983, 12/97    Left   • KNEE ARTHROSCOPY Right 6/88, 12/91, 11/97    Right   • OTHER  5/97, 4/05/05    vaginal repair   • OTHER SURGICAL PROCEDURE  4/06, 10/06    bilateral \"arm lift\" "   • SLEEVE,ARLEEN VASO THIGH     • TONSILLECTOMY       MEDICATIONS:  Current Outpatient Medications   Medication Sig   • Multiple Vitamins-Minerals (ZINC PO) Take  by mouth.   • fluticasone (FLONASE ALLERGY RELIEF) 50 MCG/ACT nasal spray Flonase Allergy Relief   • celecoxib (CELEBREX) 200 MG Cap Take 200 mg by mouth.   • losartan-hydrochlorothiazide (HYZAAR) 100-12.5 MG per tablet Take 1 Tab by mouth every day.   • ferrous sulfate 325 (65 Fe) MG tablet Take 325 mg by mouth every day.   • doxylamine (UNISOM) 25 MG Tab tablet Take 25 mg by mouth every bedtime.   • polyethylene glycol/lytes (MIRALAX) Pack Take 17 g by mouth every day.   • triamcinolone acetonide (KENALOG) 0.1 % Cream Apply  to affected area(s) as needed.   • Desvenlafaxine Succinate (PRISTIQ) 100 MG TABLET SR 24 HR Take 1 Tab by mouth every morning. Indications: Major Depressive Disorder   • fluvoxAMINE (LUVOX) 50 MG Tab Take 100 mg by mouth every morning. Indications: Major Depressive Disorder, Obsessive Compulsive Disorder   • folic acid (FOLATE) 400 MCG tablet Take 800 mcg by mouth every day.   • Coenzyme Q10 (COQ10 PO) Take 300 mg by mouth every day.   • Cyanocobalamin (VITAMIN B-12) 1000 MCG Tab Take 3,000 mcg by mouth every day.   • Multiple Vitamins-Minerals (MULTIVITAMIN ADULT PO) Take  by mouth every day.   • aspirin 81 MG tablet Take 81 mg by mouth every day.   • acetaminophen (TYLENOL) 500 MG Tab Take 500-1,000 mg by mouth every 6 hours as needed.   • aripiprazole (ABILIFY) 5 MG tablet Take 5 mg by mouth every morning. Indications: Major Depressive Disorder   • alprazolam (XANAX) 0.5 MG TABS Take 0.5 mg by mouth as needed for Anxiety. Indications: Feeling Anxious   • Probiotic Product (PROBIOTIC DAILY PO) Take 1 Cap by mouth every day.   • esomeprazole (NEXIUM) 40 MG delayed-release capsule take 1 capsule by mouth once daily     SOCIAL HISTORY:  Social History     Tobacco Use   • Smoking status: Never Smoker   • Smokeless tobacco: Never Used    Substance Use Topics   • Alcohol use: Yes     Comment: reports 1 /week     Social History     Social History Narrative   • Not on file     FAMILY HISTORY:  Family History   Problem Relation Age of Onset   • Kidney Disease Mother    • Heart Failure Father      REVIEW OF SYSTEMS:  A ROS was completed.  Pertinent positives and negatives were included in the HPI, above.  All other systems were reviewed and are negative.    PHYSICAL EXAM:  General/Medical:  - NAD  - hair, skin, nails, and joints were normal  - heart rate and rhythm were regular    Neuro:  MENTAL STATUS: awake and alert; no deficits of speech or language; oriented to person, place, and time; affect was appropriate to situation; pleasant, cooperative    CRANIAL NERVES:    II: acuity was: J1/J1; fields intact to confrontation; pupils 3/3 to 2/2 without a relative afferent pupillary defect; discs sharp    III/IV/VI: versions intact without nystagmus; no restriction of vertical gaze; choppy pursuits    V: facial sensation symmetric to light touch    VII: facial expression symmetric    VIII: hearing intact to finger rub; hard of hearing on the right side    IX/X: palate elevates symmetrically    XI: shoulder shrug symmetric    XII: tongue midline    MOTOR:  - bulk and tone were normal throughout; possibly mild paratonia  Upper Extremity Strength  (R/L)    5/5   Elbow flexion 5/5   Elbow extension 5/5   Shoulder abduction 4/5     Lower Extremity Strength  (R/L)   Hip flexion 5/5   Knee extension 5/5   Knee flexion 5/5   Ankle plantarflexion 5/5   Ankle dorsiflexion 5/5     - can walk on toes; can walk on heels, but doesn't get the heel very far off the ground  - no pronator drift; no abnormal movements    SENSATION:  - light touch: reduced over the feet  - vibration (R/L, seconds): 11/11 at the great toes  - pinprick: reduced over the right foot  - proprioception: intact at the great toes  - Romberg: absent (though this maneuver made her feel  "anxious)    COORDINATION:  - finger to nose was normal, no ataxia on exam  - finger tapping was mildly slowed, bilaterally    REFLEXES:  Reflex Right Left   BR 1+ 1+   Biceps 1+ 1+   Triceps 1+ 1+   Patellae 2+ 2+   Achilles 1+ 1+   Toes down down     GAIT:  - slightly wide-based  - veers leftward and rightward somewhat  - heel-raised and toe-raised gait: intact  - tandem gait: some difficulty with this    REVIEW OF IMAGING STUDIES:  No luis or cord imaging available.    REVIEW OF LABORATORY STUDIES:  No recent data available for review.    ASSESSMENT:  Tiffany Louis is a 75 y.o. 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  I cannot point to a single cause for Tiffany's falls.  She doesn't have tremor, rigidity, bradykinesia, or the postural instability characteristic of Parkinson disease.  Furthermore, I don't think she has one of the Parkinson-plus syndromes (namely progressive supranuclear palsy).  I suspect her falls are due to a number of factors including sensory loss in the lower extremities, arthritis, and age-related vestibular dysfunction.  I discussed my impression with Tiffany, and we agreed upon a referral to physical therapy to address gait and balance.  We will follow up in approximately 4 months.    PLAN:  Multifactorial Gait Disorder:  - referral to PT for gait and balance training    Follow-Up:  - Return in about 4 months (around 6/4/2021).    Signed: Felix Hernandez M.D. at 10:17 AM on 02/04/21    BILLING DOCUMENTATION:   I spent 44 minutes reviewing the medical record, interviewing and examining the patient, discussing my impression (see \"assessment\" above) and coordinating care.  "

## 2021-02-26 DIAGNOSIS — G47.33 OSA (OBSTRUCTIVE SLEEP APNEA): ICD-10-CM

## 2021-02-26 RX ORDER — LOSARTAN POTASSIUM AND HYDROCHLOROTHIAZIDE 12.5; 1 MG/1; MG/1
1 TABLET ORAL DAILY
Qty: 90 TABLET | Refills: 3 | Status: SHIPPED | OUTPATIENT
Start: 2021-02-26 | End: 2021-03-03 | Stop reason: SDUPTHER

## 2021-03-03 DIAGNOSIS — G47.33 OSA (OBSTRUCTIVE SLEEP APNEA): ICD-10-CM

## 2021-03-03 RX ORDER — LOSARTAN POTASSIUM AND HYDROCHLOROTHIAZIDE 12.5; 1 MG/1; MG/1
1 TABLET ORAL DAILY
Qty: 90 TABLET | Refills: 3 | Status: SHIPPED | OUTPATIENT
Start: 2021-03-03 | End: 2021-03-09 | Stop reason: SDUPTHER

## 2021-03-05 ENCOUNTER — DOCUMENTATION (OUTPATIENT)
Dept: CARDIOLOGY | Facility: MEDICAL CENTER | Age: 76
End: 2021-03-05

## 2021-03-05 ENCOUNTER — OFFICE VISIT (OUTPATIENT)
Dept: CARDIOLOGY | Facility: MEDICAL CENTER | Age: 76
End: 2021-03-05
Payer: MEDICARE

## 2021-03-05 VITALS
HEART RATE: 83 BPM | BODY MASS INDEX: 23.56 KG/M2 | DIASTOLIC BLOOD PRESSURE: 60 MMHG | OXYGEN SATURATION: 97 % | SYSTOLIC BLOOD PRESSURE: 114 MMHG | WEIGHT: 138 LBS | HEIGHT: 64 IN

## 2021-03-05 DIAGNOSIS — Z95.2 S/P TAVR (TRANSCATHETER AORTIC VALVE REPLACEMENT): ICD-10-CM

## 2021-03-05 PROCEDURE — 99214 OFFICE O/P EST MOD 30 MIN: CPT | Performed by: INTERNAL MEDICINE

## 2021-03-05 RX ORDER — OXYBUTYNIN CHLORIDE 5 MG/1
5 TABLET, EXTENDED RELEASE ORAL DAILY
COMMUNITY
End: 2021-06-04

## 2021-03-05 ASSESSMENT — FIBROSIS 4 INDEX: FIB4 SCORE: 1.78

## 2021-03-05 NOTE — PROGRESS NOTES
"      Cardiology Follow-up Consultation Note    Date of note:    3/5/2021    Primary Care Provider: Flip Aparicio M.D.    Name:             Tiffany Louis   YOB: 1945  MRN:               [id number]    CC: S/p TAVR, hypertension    Patient ID/HPI:   75-year-old female patient with history of severe aortic stenosis status post transcatheter aortic valve replacement in July 2020 here for follow-up at 9 months.  She feels well denies chest pain, shortness of breath.  Taking medications regularly.  She reports high blood pressure at home 150s in the morning and 140s after she takes meds.  Blood pressure today in clinic is normal.      ROS  All other systems reviewed and negative.    Past Medical History:   Diagnosis Date   • Anemia    • Anesthesia     \"very low blood pressure\"   • Aortic valve stenosis, severe    • Arthritis     osteo, \"all over\"   • Bilateral carotid artery stenosis    • Breast cancer (HCC)     left   • Breath shortness     from aortic stenosis   • Cancer (HCC)     skin   • Cancer (HCC) 2015    breast   • Dental disorder     upper implants front teeth   • Depression    • GERD (gastroesophageal reflux disease)    • Heart murmur    • Joint replacement     bilateral knees and right hip   • Nasal drainage    • Obesity    • Osteoporosis    • Psychiatric problem     anxiety/depression   • Renal disorder 06/11/2020    kidney stone   • Restless leg syndrome    • Sleep apnea     CPAP   • Snoring    • Umbilical hernia 10/2016   • Unspecified urinary incontinence    • Urinary bladder disorder 6/21/2017    reports freq infections but none in the past year; on ABX, + bacteria in \"gut\"         Past Surgical History:   Procedure Laterality Date   • TRANSCATHETER AORTIC VALVE REPLACEMENT  7/20/2020    Procedure: REPLACEMENT, AORTIC VALVE, TRANSCATHETER;  Surgeon: Mukesh Reid M.D.;  Location: SURGERY [facility];  Service: Cardiac   • NEHA  7/20/2020    Procedure: ECHOCARDIOGRAM, " TRANSESOPHAGEAL-;  Surgeon: Mukesh Reid M.D.;  Location: [facility];  Service: Cardiac   • LUNG BIOPSY OPEN Right 06/2020   • LATISSIMUS FLAP Left 6/27/2017    Procedure: LATISSIMUS FLAP W/INSERTION OF IMPLANT;  Surgeon: Johnny Flannery M.D.;  Location: [facility];  Service:    • BREAST IMPLANT REMOVAL Left 6/27/2017    Procedure: BREAST IMPLANT REMOVAL;  Surgeon: Johnny Flannery M.D.;  Location: [facility];  Service:    • CAPSULECTOMY  4/13/2017    and debridement left breast   • BOWEL RESECTION  12/28/2016   • BREAST RECONSTRUCTION Bilateral 11/29/2016    Procedure: BREAST RECONSTRUCTION;  Surgeon: Johnny Flannery M.D.;  Location: [facility];  Service:    • LATISSIMUS FLAP Right 11/29/2016    Procedure: LATISSIMUS FLAP W/ IMPLANT;  Surgeon: Johnny Flannery M.D.;  Location: [facility];  Service:    • TISSUE EXPANDER PLACE/REMOVE Left 11/29/2016    Procedure: TISSUE EXPANDER REMOVE - REMOVAL AND INSERTION OF IMPLANT;  Surgeon: Johnny Flannery M.D.;  Location: [facility];  Service:    • CAPSULECTOMY Bilateral 11/29/2016    Procedure: CAPSULECTOMY;  Surgeon: Johnny Flannery M.D.;  Location: [facility];  Service:    • OTHER SURGICAL PROCEDURE Right 7/2016    debridement and breast expander placement   • BREAST IMPLANT REMOVAL Right 1/6/2016    Procedure: BREAST IMPLANT REMOVAL, placement of drain;  Surgeon: Jon Leblanc M.D.;  Location: [facility];  Service:    • OTHER SURGICAL PROCEDURE Right 1/2016     second debridement right breast   • CATH PLACEMENT Right 9/8/2015    Procedure: CATH PLACEMENT PORT;  Surgeon: Vargas Hyde M.D.;  Location: [facility];  Service:    • OTHER SURGICAL PROCEDURE  9/8/2015    Port placement for chemo   • MASTECTOMY MODIFIED RADICAL Left 8/10/2015    Procedure: MASTECTOMY MODIFIED RADICAL;  Surgeon: Vargas Hyde M.D.;  Location: [facility]  "[facility];  Service:    • MASTECTOMY Right 8/10/2015    Procedure: MASTECTOMY SIMPLE;  Surgeon: Vargas Hyde M.D.;  Location: SURGERY [facility];  Service:    • NODE BIOPSY SENTINEL Left 8/10/2015    Procedure: NODE BIOPSY SENTINEL;  Surgeon: Vargas Hyde M.D.;  Location: SURGERY [facility];  Service:    • BREAST RECONSTRUCTION Bilateral 8/10/2015    Procedure: BREAST RECONSTRUCTION VIA;  Surgeon: Johnny Flannery M.D.;  Location: SURGERY [facility];  Service:    • TISSUE EXPANDER PLACE/REMOVE Bilateral 8/10/2015    Procedure: TISSUE EXPANDER PLACE/REMOVE & POSSIBLE ALLODERM;  Surgeon: Johnny Flannery M.D.;  Location: SURGERY [facility];  Service:    • ROTATOR CUFF REPAIR Right 2009    right   • HIP ARTHROPLASTY TOTAL Right 2008    Hip Replacement, Total, right   • HAND SURGERY Right 2/12/07    joint Right Thumb   • ABDOMINOPLASTY  10/28/04   • GASTRIC BYPASS LAPAROSCOPIC  10/10/01   • KNEE ARTHROPLASTY TOTAL Bilateral 1/24/00    bilateral knees   • OTHER  9/99    tumor exc Right hip   • INGRID BY LAPAROSCOPY  9/97   • BLADDER SUSPENSION  12/93   • HYSTERECTOMY, TOTAL ABDOMINAL  5/93    rectal, bladder repair   • EAR MIDDLE EXPLORATION Left 4/90    Left   • BREAST BIOPSY Left 6/85   • TUBAL LIGATION  3/84   • ARTHROSCOPY, KNEE     • CHOLECYSTECTOMY     • COLON RESECTION     • KNEE ARTHROSCOPY Left 1983, 12/97    Left   • KNEE ARTHROSCOPY Right 6/88, 12/91, 11/97    Right   • OTHER  5/97, 4/05/05    vaginal repair   • OTHER SURGICAL PROCEDURE  4/06, 10/06    bilateral \"arm lift\"   • SLEEVE,ARLEEN VASO THIGH     • TONSILLECTOMY           Current Outpatient Medications   Medication Sig Dispense Refill   • oxybutynin SR (DITROPAN-XL) 5 MG TABLET SR 24 HR Take 5 mg by mouth every day.     • losartan-hydrochlorothiazide (HYZAAR) 100-12.5 MG per tablet Take 1 tablet by mouth every day. 90 tablet 3   • Multiple Vitamins-Minerals (ZINC PO) Take  by mouth.     • fluticasone (FLONASE ALLERGY " RELIEF) 50 MCG/ACT nasal spray Flonase Allergy Relief 16 g 1   • ferrous sulfate 325 (65 Fe) MG tablet Take 325 mg by mouth every day.     • polyethylene glycol/lytes (MIRALAX) Pack Take 17 g by mouth every day.     • triamcinolone acetonide (KENALOG) 0.1 % Cream Apply  to affected area(s) as needed.     • Desvenlafaxine Succinate (PRISTIQ) 100 MG TABLET SR 24 HR Take 1 Tab by mouth every morning. Indications: Major Depressive Disorder     • fluvoxAMINE (LUVOX) 50 MG Tab Take 100 mg by mouth every morning. Indications: Major Depressive Disorder, Obsessive Compulsive Disorder     • folic acid (FOLATE) 400 MCG tablet Take 800 mcg by mouth every day.     • Coenzyme Q10 (COQ10 PO) Take 300 mg by mouth every day.     • Cyanocobalamin (VITAMIN B-12) 1000 MCG Tab Take 3,000 mcg by mouth every day.     • Multiple Vitamins-Minerals (MULTIVITAMIN ADULT PO) Take  by mouth every day.     • aspirin 81 MG tablet Take 81 mg by mouth every day.     • acetaminophen (TYLENOL) 500 MG Tab Take 500-1,000 mg by mouth every 6 hours as needed.     • aripiprazole (ABILIFY) 5 MG tablet Take 5 mg by mouth every morning. Indications: Major Depressive Disorder     • alprazolam (XANAX) 0.5 MG TABS Take 0.5 mg by mouth as needed for Anxiety. Indications: Feeling Anxious     • Probiotic Product (PROBIOTIC DAILY PO) Take 1 Cap by mouth every day.     • doxylamine (UNISOM) 25 MG Tab tablet Take 25 mg by mouth every bedtime.       No current facility-administered medications for this visit.         Allergies   Allergen Reactions   • Ampicillin Shortness of Breath     Tolerated Cefazolin on 08/10/15   • Clindamycin Shortness of Breath and Rash     .   • Levaquin Shortness of Breath   • Penicillins Shortness of Breath and Rash     Tolerated Ancef 08/10/15   • Amlodipine      swelling   • Demerol Itching     Itching and rash     • Lisinopril Cough         Family History   Problem Relation Age of Onset   • Kidney Disease Mother    • Heart Failure Father   "        Social History     Socioeconomic History   • Marital status:      Spouse name: Not on file   • Number of children: Not on file   • Years of education: Not on file   • Highest education level: Not on file   Occupational History   • Not on file   Tobacco Use   • Smoking status: Never Smoker   • Smokeless tobacco: Never Used   Substance and Sexual Activity   • Alcohol use: Yes     Comment: reports 1 /week   • Drug use: No   • Sexual activity: Not on file   Other Topics Concern   • Not on file   Social History Narrative   • Not on file     Social Determinants of Health     Financial Resource Strain:    • Difficulty of Paying Living Expenses:    Food Insecurity:    • Worried About Running Out of Food in the Last Year:    • Ran Out of Food in the Last Year:    Transportation Needs:    • Lack of Transportation (Medical):    • Lack of Transportation (Non-Medical):    Physical Activity:    • Days of Exercise per Week:    • Minutes of Exercise per Session:    Stress:    • Feeling of Stress :    Social Connections:    • Frequency of Communication with Friends and Family:    • Frequency of Social Gatherings with Friends and Family:    • Attends Jain Services:    • Active Member of Clubs or Organizations:    • Attends Club or Organization Meetings:    • Marital Status:    Intimate Partner Violence:    • Fear of Current or Ex-Partner:    • Emotionally Abused:    • Physically Abused:    • Sexually Abused:          Physical Exam:  Ambulatory Vitals  /60 (BP Location: Left arm, Patient Position: Sitting, BP Cuff Size: Adult)   Pulse 83   Ht 1.626 m (5' 4\")   Wt 62.6 kg (138 lb)   SpO2 97%    Oxygen Therapy:  Pulse Oximetry: 97 %  BP Readings from Last 4 Encounters:   03/05/21 114/60   02/04/21 124/74   01/04/21 122/80   09/23/20 160/70       Weight/BMI: Body mass index is 23.69 kg/m².  Wt Readings from Last 4 Encounters:   03/05/21 62.6 kg (138 lb)   02/04/21 66.7 kg (147 lb)   01/04/21 68 kg (150 lb) "   09/23/20 71.7 kg (158 lb)       General: Well appearing and in no apparent distress  Head: atrumatic  Eyes: No conjunctival pallor   ENT: normal external appearance of nose and ears  Neck: JVD absent, carotid bruits absent  Lungs: respiratory sounds  normal, additional breath sounds absent  Heart: Regular rhythm,   No palpable thrills on palpation, murmurs absent, no rubs,   Lower extremity edema absent.   Pedal pulses normal  Abdomen: soft, non tender, non distended.  Extremities/MSK: no clubbing, no cyanosis  Neurological: normal orientation, Gait normal   Psychiatric: Appropriate affect, intact judgement and insight  Skin: Warm extremities      Lab Data Review:  No results found for: CHOLSTRLTOT, LDL, HDL, TRIGLYCERIDE    Lab Results   Component Value Date/Time    SODIUM 136 07/21/2020 05:07 AM    POTASSIUM 3.8 07/21/2020 05:07 AM    CHLORIDE 99 07/21/2020 05:07 AM    CO2 25 07/21/2020 05:07 AM    GLUCOSE 114 (H) 07/21/2020 05:07 AM    BUN 13 07/21/2020 05:07 AM    CREATININE 0.66 07/21/2020 05:07 AM    CREATININE 1.1 03/17/2008 03:05 PM     Lab Results   Component Value Date/Time    ALKPHOSPHAT 84 07/21/2020 05:07 AM    ASTSGOT 17 07/21/2020 05:07 AM    ALTSGPT 15 07/21/2020 05:07 AM    TBILIRUBIN 0.5 07/21/2020 05:07 AM      Lab Results   Component Value Date/Time    WBC 4.8 07/21/2020 05:07 AM     Echocardiogram 9/21/20:  CONCLUSIONS  Compared to the images of the study done 7/21/20 - there has been no   changes.   Left ventricular ejection fraction is visually estimated to be 65%.  Normal regional wall motion.  Known TAVR aortic valve that is functioning normally with normal   transvalvular gradients.  Vmax is 2.28  m/s.  Right ventricular systolic pressure is estimated to be 23 mmHg.    CONCLUSIONS  Compared to the images of the study done 7/21/20 - there has been no   changes.   Left ventricular ejection fraction is visually estimated to be 65%.  Normal regional wall motion.  Known TAVR aortic valve that  is functioning normally with normal   transvalvular gradients.  Vmax is 2.28  m/s.  Right ventricular systolic pressure is estimated to be 23 mmHg.     Cath 7/10/2020  Findings:  1.  Left main coronary artery:  Normal.  2.  Left anterior descending artery:  Normal.   3.  Left circumflex coronary artery:  Normal.    4.  Right coronary artery:  Normal.  This is a right dominant system.    Neurology notes reviewed from 2/4/2021 by Dr. Love  Impression and Plan:  75-year-old female patient with  1.  Severe aortic stenosis status post TAVR, NYHA class II stage C  2.  Hypertension uncontrolled  3.  Lung nodule being monitored by oncology  4.  Falls    She is stable from cardiovascular standpoint.  Recommend bringing her blood pressure cuff to the next clinic appointment to cross check.  If her home blood pressures are indeed high, we have to adjust her medications.  Today blood pressure is normal we will continue losartan hydrochlorothiazide.  Continue aspirin 81 mg daily.  We will schedule echocardiogram in 3 months that would be 1 year echocardiogram.  No need for follow-up with me, she will continue follow-up with Dr. Alfaro,  see him in 6 months        Mukesh STOUT  Interventional cardiologist  [facility]  [facility], [address]  [address] [address]  Phone: [phone]  Fax: [phone]

## 2021-03-05 NOTE — PROGRESS NOTES
Valve Program Functional Assessment: 1 year s/p TAVR    KCCQ12   1a) Showering/bathin  1b) Walking 1 block on ground: 5  1c) Hurrying or joggin  2) Swellin  3) Fatigue: 6  4) Shortness of breath: 6  5) Sleep sitting up: 5  6) Limited enjoyment of life: 5  7) Spend the rest of your life with HF: 5  8a) Hobbies, recreational activities:5  8b) Working or doing household chores:5  8c) Visiting family or friends: 5    5 meter walk test  1) __4.30____ s/5m  2) __2.09____ s/5m  3) __1.22____ s/5m     Strength   1) _4_____ kg  2) _4_____ kg  3) _2_____ kg    SAVAGE ADLs  Patient independently preforms...   - Bathing: Yes   - Dressing: Yes   - Toileting: Yes   - Transferring: Yes   - Continence: Yes   - Feeding: Yes     Living Situation  Patient lives: with spouse    Mobility Aids   Patient uses: none

## 2021-03-09 DIAGNOSIS — G47.33 OSA (OBSTRUCTIVE SLEEP APNEA): ICD-10-CM

## 2021-03-09 RX ORDER — LOSARTAN POTASSIUM AND HYDROCHLOROTHIAZIDE 12.5; 1 MG/1; MG/1
1 TABLET ORAL DAILY
Qty: 90 TABLET | Refills: 3 | Status: SHIPPED | OUTPATIENT
Start: 2021-03-09 | End: 2022-02-16

## 2021-03-10 DIAGNOSIS — G47.33 OSA (OBSTRUCTIVE SLEEP APNEA): ICD-10-CM

## 2021-03-10 RX ORDER — LOSARTAN POTASSIUM AND HYDROCHLOROTHIAZIDE 12.5; 1 MG/1; MG/1
1 TABLET ORAL DAILY
Qty: 90 TABLET | Refills: 3 | OUTPATIENT
Start: 2021-03-10

## 2021-03-12 ENCOUNTER — HOSPITAL ENCOUNTER (OUTPATIENT)
Facility: MEDICAL CENTER | Age: 76
End: 2021-03-12
Attending: PHYSICIAN ASSISTANT
Payer: MEDICARE

## 2021-03-12 PROCEDURE — 87086 URINE CULTURE/COLONY COUNT: CPT

## 2021-03-15 LAB
BACTERIA UR CULT: NORMAL
SIGNIFICANT IND 70042: NORMAL
SITE SITE: NORMAL
SOURCE SOURCE: NORMAL

## 2021-04-19 ENCOUNTER — TELEPHONE (OUTPATIENT)
Dept: CARDIOLOGY | Facility: MEDICAL CENTER | Age: 76
End: 2021-04-19

## 2021-04-19 NOTE — TELEPHONE ENCOUNTER
AK    Patient called and needs lab orders for her CT sent to [facility]. Please let her know when done. Her CT is scheduled for May 3rd.    Thank you,    Lisa VILLELA

## 2021-05-03 ENCOUNTER — HOSPITAL ENCOUNTER (OUTPATIENT)
Dept: RADIOLOGY | Facility: MEDICAL CENTER | Age: 76
End: 2021-05-03
Attending: INTERNAL MEDICINE
Payer: MEDICARE

## 2021-05-03 DIAGNOSIS — C50.912 MALIGNANT NEOPLASM OF LEFT FEMALE BREAST, UNSPECIFIED ESTROGEN RECEPTOR STATUS, UNSPECIFIED SITE OF BREAST (HCC): ICD-10-CM

## 2021-05-03 PROCEDURE — 71260 CT THORAX DX C+: CPT | Mod: MH

## 2021-05-03 PROCEDURE — 700117 HCHG RX CONTRAST REV CODE 255: Performed by: INTERNAL MEDICINE

## 2021-05-03 RX ADMIN — IOHEXOL 75 ML: 350 INJECTION, SOLUTION INTRAVENOUS at 15:15

## 2021-06-04 ENCOUNTER — OFFICE VISIT (OUTPATIENT)
Dept: NEUROLOGY | Facility: MEDICAL CENTER | Age: 76
End: 2021-06-04
Attending: PSYCHIATRY & NEUROLOGY
Payer: MEDICARE

## 2021-06-04 VITALS
TEMPERATURE: 97.9 F | OXYGEN SATURATION: 96 % | WEIGHT: 138 LBS | BODY MASS INDEX: 23.56 KG/M2 | HEIGHT: 64 IN | SYSTOLIC BLOOD PRESSURE: 96 MMHG | HEART RATE: 99 BPM | DIASTOLIC BLOOD PRESSURE: 58 MMHG

## 2021-06-04 DIAGNOSIS — R26.89 MULTIFACTORIAL GAIT DISORDER: ICD-10-CM

## 2021-06-04 PROCEDURE — 99212 OFFICE O/P EST SF 10 MIN: CPT | Performed by: PSYCHIATRY & NEUROLOGY

## 2021-06-04 PROCEDURE — 99213 OFFICE O/P EST LOW 20 MIN: CPT | Performed by: PSYCHIATRY & NEUROLOGY

## 2021-06-04 RX ORDER — MIRABEGRON 50 MG/1
50 TABLET, FILM COATED, EXTENDED RELEASE ORAL EVERY EVENING
COMMUNITY
Start: 2021-05-19 | End: 2022-10-10

## 2021-06-04 ASSESSMENT — FIBROSIS 4 INDEX: FIB4 SCORE: 1.8

## 2021-06-04 NOTE — PROGRESS NOTES
"[facility]  FOLLOW-UP VISIT    CC: multifactorial gait disorder    INTERVAL HISTORY:  Tiffany Louis is a 76 y.o. woman with multifactorial gait disorder and a history otherwise notable for breast cancer, carotid artery stenosis, arthritis, multiple joint replacements, and RICHMOND.  I last saw her in the clinic on 2/4/2021.  At that time I recommended physical therapy.  Today, she was unaccompanied, and she provided the following interval history:    Tiffany has done well since the last visit.  She attended PT regularly noticed an improvement in her gait.  She went on vacation for a few weeks and stopped exercises.  During this time she worsened slightly.  There was one fall at home when she simply \"lost her balance.\"  She was able to get up on her own which would have been difficult in the past.  She resumes PT next week.  Otherwise, there have not been any new or worsened neurologic symptoms.    MEDICATIONS:  Current Outpatient Medications   Medication Sig   • MYRBETRIQ 50 MG TABLET SR 24 HR    • losartan-hydrochlorothiazide (HYZAAR) 100-12.5 MG per tablet Take 1 tablet by mouth every day.   • fluticasone (FLONASE ALLERGY RELIEF) 50 MCG/ACT nasal spray Flonase Allergy Relief   • ferrous sulfate 325 (65 Fe) MG tablet Take 325 mg by mouth every day.   • doxylamine (UNISOM) 25 MG Tab tablet Take 25 mg by mouth every bedtime.   • polyethylene glycol/lytes (MIRALAX) Pack Take 17 g by mouth every day.   • triamcinolone acetonide (KENALOG) 0.1 % Cream Apply  to affected area(s) as needed.   • Desvenlafaxine Succinate (PRISTIQ) 100 MG TABLET SR 24 HR Take 1 Tab by mouth every morning. Indications: Major Depressive Disorder   • fluvoxAMINE (LUVOX) 50 MG Tab Take 100 mg by mouth every morning. Indications: Major Depressive Disorder, Obsessive Compulsive Disorder   • folic acid (FOLATE) 400 MCG tablet Take 800 mcg by mouth every day.   • Coenzyme Q10 (COQ10 PO) Take 300 mg by mouth every day.   • " Cyanocobalamin (VITAMIN B-12) 1000 MCG Tab Take 3,000 mcg by mouth every day.   • aspirin 81 MG tablet Take 81 mg by mouth every day.   • acetaminophen (TYLENOL) 500 MG Tab Take 500-1,000 mg by mouth every 6 hours as needed.   • aripiprazole (ABILIFY) 5 MG tablet Take 5 mg by mouth every morning. Indications: Major Depressive Disorder   • alprazolam (XANAX) 0.5 MG TABS Take 0.5 mg by mouth as needed for Anxiety. Indications: Feeling Anxious   • Probiotic Product (PROBIOTIC DAILY PO) Take 1 Cap by mouth every day.     MEDICAL, SOCIAL, AND FAMILY HISTORY:  There is no change in the patient's ROS or PFSH from their previous visit on 2/4/2021.    REVIEW OF SYSTEMS:  A ROS was completed.  Pertinent positives and negatives were included in the HPI, above.  All other systems were reviewed and are negative.    PHYSICAL EXAM:  General/Medical:  - NAD     Neuro:  MENTAL STATUS: awake and alert; no deficits of speech or language; oriented to person, place, and time; affect was appropriate to situation; pleasant, cooperative     CRANIAL NERVES:    II: acuity was: J2/J1 with trifocals; fields intact to confrontation; pupils 3/3 to 2/2 without a relative afferent pupillary defect; discs sharp    III/IV/VI: versions intact without nystagmus; no restriction of vertical gaze; choppy pursuits    V: facial sensation symmetric to light touch    VII: facial expression symmetric    VIII: hearing intact to finger rub; hard of hearing on the right side    IX/X: palate elevates symmetrically    XI: shoulder shrug symmetric    XII: tongue midline     MOTOR:  - bulk was normal  - tone was normal in the upper extremities  Upper Extremity Strength  (R/L)    5/5   Elbow flexion 5/5   Elbow extension 5/5   Shoulder abduction 4/5      Lower Extremity Strength  (R/L)   Hip flexion 5/5   Knee extension 5/5   Knee flexion 5/5   Ankle plantarflexion 5/5   Ankle dorsiflexion 5/5      - can walk on toes; can walk on heels  - no pronator drift; no  "abnormal movements     SENSATION:  - light touch: reduced over the feet  - vibration (R/L, seconds): NT at the great toes  - pinprick: reduced over the right foot  - proprioception: intact at the great toes  - Romberg: absent     COORDINATION:  - finger to nose was normal, no ataxia on exam  - finger tapping was mildly slowed, bilaterally     REFLEXES:  Reflex Right Left   BR 1+ 1+   Biceps 1+ 1+   Triceps 1+ 1+   Patellae 1+ 1+   Achilles 1+ 1+   Toes NT NT      GAIT:  - slightly wide-based  - heel-raised and toe-raised gait: intact  - tandem gait: some difficulty with this    REVIEW OF IMAGING STUDIES:  No new images since the last visit.    REVIEW OF LABORATORY STUDIES:  No new/pertinent data since the last visit.    ASSESSMENT:  Tiffany Louis is a 76 y.o. woman with multifactorial gait disorder and history otherwise notable for breast cancer, carotid artery stenosis, arthritis, multiple joint replacements, and RICHMOND.  She is responded well to physical therapy.  She reports an improvement in her gait and reduction in frequency of falls.  She plans to resume physical therapy in the coming weeks.  At this point, I do not recommend any additional work-up.  She will contact the clinic if symptoms worsen or if new neurologic symptoms develop.    PLAN:  Multifactorial Gait Disorder  - continue exercise  - no additional workup at this time    Follow-Up:  - Return if symptoms worsen or fail to improve.    Signed: Felix Hernandez M.D. at 7:35 AM on 06/04/21    BILLING DOCUMENTATION:   I spent 20 minutes reviewing the medical record, interviewing and examining the patient, discussing my impression (see \"assessment\" above), and coordinating care.  "

## 2021-07-12 ENCOUNTER — HOSPITAL ENCOUNTER (OUTPATIENT)
Dept: CARDIOLOGY | Facility: MEDICAL CENTER | Age: 76
End: 2021-07-12
Attending: INTERNAL MEDICINE
Payer: MEDICARE

## 2021-07-12 DIAGNOSIS — Z95.2 S/P TAVR (TRANSCATHETER AORTIC VALVE REPLACEMENT): ICD-10-CM

## 2021-07-12 LAB
LV EJECT FRACT  99904: 60
LV EJECT FRACT MOD 2C 99903: 61.14
LV EJECT FRACT MOD 4C 99902: 56.91
LV EJECT FRACT MOD BP 99901: 56.5

## 2021-07-12 PROCEDURE — 93306 TTE W/DOPPLER COMPLETE: CPT | Mod: 26 | Performed by: INTERNAL MEDICINE

## 2021-07-12 PROCEDURE — 93306 TTE W/DOPPLER COMPLETE: CPT

## 2021-08-12 ENCOUNTER — SLEEP CENTER VISIT (OUTPATIENT)
Dept: SLEEP MEDICINE | Facility: MEDICAL CENTER | Age: 76
End: 2021-08-12
Payer: MEDICARE

## 2021-08-12 VITALS
OXYGEN SATURATION: 97 % | RESPIRATION RATE: 16 BRPM | SYSTOLIC BLOOD PRESSURE: 122 MMHG | DIASTOLIC BLOOD PRESSURE: 72 MMHG | HEART RATE: 69 BPM | WEIGHT: 139 LBS | HEIGHT: 64 IN | BODY MASS INDEX: 23.73 KG/M2

## 2021-08-12 DIAGNOSIS — J31.0 CHRONIC RHINITIS: ICD-10-CM

## 2021-08-12 DIAGNOSIS — G47.33 OSA (OBSTRUCTIVE SLEEP APNEA): ICD-10-CM

## 2021-08-12 PROCEDURE — 99213 OFFICE O/P EST LOW 20 MIN: CPT | Performed by: FAMILY MEDICINE

## 2021-08-12 RX ORDER — FLUTICASONE PROPIONATE 50 MCG
SPRAY, SUSPENSION (ML) NASAL
Qty: 16 G | Refills: 11 | Status: SHIPPED | OUTPATIENT
Start: 2021-08-12 | End: 2022-06-13

## 2021-08-12 ASSESSMENT — FIBROSIS 4 INDEX: FIB4 SCORE: 1.8

## 2021-08-12 NOTE — PATIENT INSTRUCTIONS
"CPAP and BPAP Information  CPAP and BPAP are methods of helping a person breathe with the use of air pressure. CPAP stands for \"continuous positive airway pressure.\" BPAP stands for \"bi-level positive airway pressure.\" In both methods, air is blown through your nose or mouth and into your air passages to help you breathe well.  CPAP and BPAP use different amounts of pressure to blow air. With CPAP, the amount of pressure stays the same while you breathe in and out. With BPAP, the amount of pressure is increased when you breathe in (inhale) so that you can take larger breaths. Your health care provider will recommend whether CPAP or BPAP would be more helpful for you.  Why are CPAP and BPAP treatments used?  CPAP or BPAP can be helpful if you have:  · Sleep apnea.  · Chronic obstructive pulmonary disease (COPD).  · Heart failure.  · Medical conditions that weaken the muscles of the chest including muscular dystrophy, or neurological diseases such as amyotrophic lateral sclerosis (ALS).  · Other problems that cause breathing to be weak, abnormal, or difficult.  CPAP is most commonly used for obstructive sleep apnea (RICHMOND) to keep the airways from collapsing when the muscles relax during sleep.  How is CPAP or BPAP administered?  Both CPAP and BPAP are provided by a small machine with a flexible plastic tube that attaches to a plastic mask. You wear the mask. Air is blown through the mask into your nose or mouth. The amount of pressure that is used to blow the air can be adjusted on the machine. Your health care provider will determine the pressure setting that should be used based on your individual needs.  When should CPAP or BPAP be used?  In most cases, the mask only needs to be worn during sleep. Generally, the mask needs to be worn throughout the night and during any daytime naps. People with certain medical conditions may also need to wear the mask at other times when they are awake. Follow instructions from your " health care provider about when to use the machine.  What are some tips for using the mask?    · Because the mask needs to be snug, some people feel trapped or closed-in (claustrophobic) when first using the mask. If you feel this way, you may need to get used to the mask. One way to do this is by holding the mask loosely over your nose or mouth and then gradually applying the mask more snugly. You can also gradually increase the amount of time that you use the mask.  · Masks are available in various types and sizes. Some fit over your mouth and nose while others fit over just your nose. If your mask does not fit well, talk with your health care provider about getting a different one.  · If you are using a mask that fits over your nose and you tend to breathe through your mouth, a chin strap may be applied to help keep your mouth closed.  · The CPAP and BPAP machines have alarms that may sound if the mask comes off or develops a leak.  · If you have trouble with the mask, it is very important that you talk with your health care provider about finding a way to make the mask easier to tolerate. Do not stop using the mask. Stopping the use of the mask could have a negative impact on your health.  What are some tips for using the machine?  · Place your CPAP or BPAP machine on a secure table or stand near an electrical outlet.  · Know where the on/off switch is located on the machine.  · Follow instructions from your health care provider about how to set the pressure on your machine and when you should use it.  · Do not eat or drink while the CPAP or BPAP machine is on. Food or fluids could get pushed into your lungs by the pressure of the CPAP or BPAP.  · Do not smoke. Tobacco smoke residue can damage the machine.  · For home use, CPAP and BPAP machines can be rented or purchased through home health care companies. Many different brands of machines are available. Renting a machine before purchasing may help you find out  which particular machine works well for you.  · Keep the CPAP or BPAP machine and attachments clean. Ask your health care provider for specific instructions.  Get help right away if:  · You have redness or open areas around your nose or mouth where the mask fits.  · You have trouble using the CPAP or BPAP machine.  · You cannot tolerate wearing the CPAP or BPAP mask.  · You have pain, discomfort, and bloating in your abdomen.  Summary  · CPAP and BPAP are methods of helping a person breathe with the use of air pressure.  · Both CPAP and BPAP are provided by a small machine with a flexible plastic tube that attaches to a plastic mask.  · If you have trouble with the mask, it is very important that you talk with your health care provider about finding a way to make the mask easier to tolerate.  This information is not intended to replace advice given to you by your health care provider. Make sure you discuss any questions you have with your health care provider.  Document Released: 09/15/2005 Document Revised: 04/08/2020 Document Reviewed: 11/06/2017  Elsevier Patient Education © 2020 [employer]

## 2021-08-12 NOTE — PROGRESS NOTES
[facility] Follow Up Note     Date: 8/12/2021 / Time: 10:27 AM    Patient ID:   Name:             Tiffany Louis   YOB: 1945  Age:                 76 y.o.  female   MRN:               [id number]      Thank you for requesting a sleep medicine consultation on Tiffany Louis at the sleep center. She presents today with the chief complaints of RICHMOND follow up.     HISTORY OF PRESENT ILLNESS:       Pt is currently on CPAP 7 cm. She goes to sleep around 8:30-9 pm and wakes up around 7 am. She is getting about 8-9 hrs of sleep on a good night.The bad nights are rare per week. Overall,  She does finds her sleep refreshing.She does not take regular naps. She uses xanax at night time. In past she was on ambien CR. It is managed by psychiatrist. She denies any symptoms of RLS, narcolepsy or any symptoms to suggest parasomnias such as nightmares, sleep walking or acting out of dreams.She is using CPAP most days of the week. Pt reports 9 hrs of average nightly use of CPAP. Pt denies snoring, gasping,choking.Pt also denies significant mask leak that is interfering with sleep. The 30 day compliance was downloaded which shows adequate compliance with more that 4 hr usage about 70%. The AHI is has improved to 0.5/hr. The mask leak is normal. The symptoms of RICHMOND are well controlled on the therapy.         REVIEW OF SYSTEMS:       Constitutional: Denies fevers, Denies weight changes  Eyes: Denies changes in vision, no eye pain  Ears/Nose/Throat/Mouth: Denies nasal congestion or sore throat   Cardiovascular: Denies chest pain or palpitations   Respiratory: Denies shortness of breath , Denies cough  Gastrointestinal/Hepatic: Denies abdominal pain, nausea  Skin/Breast: Denies rash,   Neurological: Denies headache, confusion, memory loss or focal weakness/parasthesias  Psychiatric: denies mood disorder   Sleep: denies snoring and apnea     Comprehensive review of systems form is reviewed with the patient  and is attached in the EMR.     PMH:  has a past medical history of Anemia, Anesthesia, Aortic valve stenosis, severe, Arthritis, Bilateral carotid artery stenosis, Breast cancer (HCC) (), Breath shortness, Cancer (HCC), Cancer (HCC) (2015), Dental disorder, Depression, GERD (gastroesophageal reflux disease), Heart murmur, Joint replacement, Nasal drainage, Obesity, Osteoporosis, Psychiatric problem, Renal disorder (06/11/2020), Restless leg syndrome, Sleep apnea, Snoring, Umbilical hernia (10/2016), Unspecified urinary incontinence, and Urinary bladder disorder (6/21/2017).  MEDS:   Current Outpatient Medications:   •  losartan-hydrochlorothiazide (HYZAAR) 100-12.5 MG per tablet, Take 1 tablet by mouth every day., Disp: 90 tablet, Rfl: 3  •  fluticasone (FLONASE ALLERGY RELIEF) 50 MCG/ACT nasal spray, Flonase Allergy Relief, Disp: 16 g, Rfl: 1  •  ferrous sulfate 325 (65 Fe) MG tablet, Take 325 mg by mouth every day., Disp: , Rfl:   •  doxylamine (UNISOM) 25 MG Tab tablet, Take 25 mg by mouth every bedtime., Disp: , Rfl:   •  polyethylene glycol/lytes (MIRALAX) Pack, Take 17 g by mouth every day., Disp: , Rfl:   •  triamcinolone acetonide (KENALOG) 0.1 % Cream, Apply  to affected area(s) as needed., Disp: , Rfl:   •  Desvenlafaxine Succinate (PRISTIQ) 100 MG TABLET SR 24 HR, Take 1 Tab by mouth every morning. Indications: Major Depressive Disorder, Disp: , Rfl:   •  fluvoxAMINE (LUVOX) 50 MG Tab, Take 100 mg by mouth every morning. Indications: Major Depressive Disorder, Obsessive Compulsive Disorder, Disp: , Rfl:   •  folic acid (FOLATE) 400 MCG tablet, Take 800 mcg by mouth every day., Disp: , Rfl:   •  Coenzyme Q10 (COQ10 PO), Take 300 mg by mouth every day., Disp: , Rfl:   •  Cyanocobalamin (VITAMIN B-12) 1000 MCG Tab, Take 3,000 mcg by mouth every day., Disp: , Rfl:   •  aspirin 81 MG tablet, Take 81 mg by mouth every day., Disp: , Rfl:   •  acetaminophen (TYLENOL) 500 MG Tab, Take 500-1,000 mg by mouth  every 6 hours as needed., Disp: , Rfl:   •  aripiprazole (ABILIFY) 5 MG tablet, Take 5 mg by mouth every morning. Indications: Major Depressive Disorder, Disp: , Rfl:   •  alprazolam (XANAX) 0.5 MG TABS, Take 0.5 mg by mouth as needed for Anxiety. Indications: Feeling Anxious, Disp: , Rfl:   •  Probiotic Product (PROBIOTIC DAILY PO), Take 1 Cap by mouth every day., Disp: , Rfl:   •  MYRBETRIQ 50 MG TABLET SR 24 HR, , Disp: , Rfl:   ALLERGIES:   Allergies   Allergen Reactions   • Ampicillin Shortness of Breath and Swelling     Tolerated Cefazolin on 08/10/15   • Clindamycin Shortness of Breath and Rash     .   • Levaquin Shortness of Breath   • Penicillins Shortness of Breath and Rash     Tolerated Ancef 08/10/15   • Amlodipine      swelling   • Demerol Itching     Itching and rash     • Lisinopril Cough     SURGHX:   Past Surgical History:   Procedure Laterality Date   • TRANSCATHETER AORTIC VALVE REPLACEMENT  7/20/2020    Procedure: REPLACEMENT, AORTIC VALVE, TRANSCATHETER;  Surgeon: Mukesh Reid M.D.;  Location: [facility];  Service: Cardiac   • NEHA  7/20/2020    Procedure: ECHOCARDIOGRAM, TRANSESOPHAGEAL-;  Surgeon: Mukesh Reid M.D.;  Location: [facility];  Service: Cardiac   • LUNG BIOPSY OPEN Right 06/2020   • LATISSIMUS FLAP Left 6/27/2017    Procedure: LATISSIMUS FLAP W/INSERTION OF IMPLANT;  Surgeon: Johnny Flannery M.D.;  Location: [facility];  Service:    • BREAST IMPLANT REMOVAL Left 6/27/2017    Procedure: BREAST IMPLANT REMOVAL;  Surgeon: Johnny Flannery M.D.;  Location: [facility];  Service:    • CAPSULECTOMY  4/13/2017    and debridement left breast   • BOWEL RESECTION  12/28/2016   • BREAST RECONSTRUCTION Bilateral 11/29/2016    Procedure: BREAST RECONSTRUCTION;  Surgeon: Johnny Flannery M.D.;  Location: [facility];  Service:    • LATISSIMUS FLAP Right 11/29/2016    Procedure: LATISSIMUS FLAP W/ IMPLANT;   Surgeon: Johnny Flannery M.D.;  Location: [facility];  Service:    • TISSUE EXPANDER PLACE/REMOVE Left 11/29/2016    Procedure: TISSUE EXPANDER REMOVE - REMOVAL AND INSERTION OF IMPLANT;  Surgeon: Johnny Flannery M.D.;  Location: [facility];  Service:    • CAPSULECTOMY Bilateral 11/29/2016    Procedure: CAPSULECTOMY;  Surgeon: Johnny Flannery M.D.;  Location: [facility];  Service:    • OTHER SURGICAL PROCEDURE Right 7/2016    debridement and breast expander placement   • BREAST IMPLANT REMOVAL Right 1/6/2016    Procedure: BREAST IMPLANT REMOVAL, placement of drain;  Surgeon: Jon Leblanc M.D.;  Location: [facility];  Service:    • OTHER SURGICAL PROCEDURE Right 1/2016     second debridement right breast   • CATH PLACEMENT Right 9/8/2015    Procedure: CATH PLACEMENT PORT;  Surgeon: Vargas Hyde M.D.;  Location: [facility];  Service:    • OTHER SURGICAL PROCEDURE  9/8/2015    Port placement for chemo   • MASTECTOMY MODIFIED RADICAL Left 8/10/2015    Procedure: MASTECTOMY MODIFIED RADICAL;  Surgeon: Vargas Hyde M.D.;  Location: [facility];  Service:    • MASTECTOMY Right 8/10/2015    Procedure: MASTECTOMY SIMPLE;  Surgeon: Vargas Hyde M.D.;  Location: [facility];  Service:    • NODE BIOPSY SENTINEL Left 8/10/2015    Procedure: NODE BIOPSY SENTINEL;  Surgeon: Vargas Hyde M.D.;  Location: [facility];  Service:    • BREAST RECONSTRUCTION Bilateral 8/10/2015    Procedure: BREAST RECONSTRUCTION VIA;  Surgeon: Johnny Flannery M.D.;  Location: [facility];  Service:    • TISSUE EXPANDER PLACE/REMOVE Bilateral 8/10/2015    Procedure: TISSUE EXPANDER PLACE/REMOVE & POSSIBLE ALLODERM;  Surgeon: Johnny Flannery M.D.;  Location: [facility];  Service:    • ROTATOR CUFF REPAIR Right 2009    right   • HIP ARTHROPLASTY TOTAL Right 2008    Hip Replacement, Total, right   • HAND  "SURGERY Right 2/12/07    joint Right Thumb   • ABDOMINOPLASTY  10/28/04   • GASTRIC BYPASS LAPAROSCOPIC  10/10/01   • KNEE ARTHROPLASTY TOTAL Bilateral 1/24/00    bilateral knees   • OTHER  9/99    tumor exc Right hip   • INGRID BY LAPAROSCOPY  9/97   • BLADDER SUSPENSION  12/93   • HYSTERECTOMY, TOTAL ABDOMINAL  5/93    rectal, bladder repair   • EAR MIDDLE EXPLORATION Left 4/90    Left   • BREAST BIOPSY Left 6/85   • TUBAL LIGATION  3/84   • ARTHROSCOPY, KNEE     • CHOLECYSTECTOMY     • COLON RESECTION     • KNEE ARTHROSCOPY Left 1983, 12/97    Left   • KNEE ARTHROSCOPY Right 6/88, 12/91, 11/97    Right   • OTHER  5/97, 4/05/05    vaginal repair   • OTHER SURGICAL PROCEDURE  4/06, 10/06    bilateral \"arm lift\"   • SLEEVE,ARLEEN VASO THIGH     • TONSILLECTOMY       SOCHX:  reports that she has never smoked. She has never used smokeless tobacco. She reports current alcohol use. She reports that she does not use drugs..  FH:   Family History   Problem Relation Age of Onset   • Kidney Disease Mother    • Heart Failure Father          Physical Exam:  Vitals/ General Appearance:   Weight/BMI: Body mass index is 23.86 kg/m².  /72 (BP Location: Right arm, Patient Position: Sitting, BP Cuff Size: Adult)   Pulse 69   Resp 16   Ht 1.626 m (5' 4\")   Wt 63 kg (139 lb)   SpO2 97%   Vitals:    08/12/21 1016   BP: 122/72   BP Location: Right arm   Patient Position: Sitting   BP Cuff Size: Adult   Pulse: 69   Resp: 16   SpO2: 97%   Weight: 63 kg (139 lb)   Height: 1.626 m (5' 4\")       Pt. is alert and oriented to time, place and person. Cooperative and in no apparent distress.       Constitutional: Alert, no distress, well-groomed.  Skin: No rashes in visible areas.  Eye: Round. Conjunctiva clear, lids normal. No icterus.   ENMT: Lips pink without lesions, good dentition, moist mucous membranes. Phonation normal.  Neck: No masses, no thyromegaly. Moves freely without pain.  CV: Pulse as reported by patient  Respiratory: " Unlabored respiratory effort, no cough or audible wheeze  Psych: Alert and oriented x3, normal affect and mood.     ASSESSMENT AND PLAN     1. Sleep Apnea    The pathophysiology of sleep anea and the increased risk of cardiovascular morbidity from untreated sleep apnea is discussed in detail with the patient. She is urged to avoid supine sleep, weight gain and alcoholic beverages since all of these can worsen sleep apnea. She is cautioned against drowsy driving. If She feels sleepy while driving, She must pull over for a break/nap, rather than persist on the road, in the interest of She own safety and that of others on the road.   Plan   - Continue CPAP 7 cm with nasal pillow mask    - Ordering replacement CPAP due to current machine being over 5 years old and continuing to alarm during the night, interrupting patients sleep. New CPAP 7 cm is ordered today.   - compliance download was reviewed and discussed with the pt   - compliance was reinforced    - Flonase refill x11   2. Regarding treatment of other past medical problems and general health maintenance,  She is urged to follow up with PCP.

## 2021-09-07 ENCOUNTER — OFFICE VISIT (OUTPATIENT)
Dept: CARDIOLOGY | Facility: MEDICAL CENTER | Age: 76
End: 2021-09-07
Payer: MEDICARE

## 2021-09-07 VITALS
RESPIRATION RATE: 16 BRPM | WEIGHT: 140.2 LBS | BODY MASS INDEX: 23.93 KG/M2 | OXYGEN SATURATION: 92 % | SYSTOLIC BLOOD PRESSURE: 124 MMHG | HEART RATE: 76 BPM | HEIGHT: 64 IN | DIASTOLIC BLOOD PRESSURE: 60 MMHG

## 2021-09-07 DIAGNOSIS — Z98.890 HISTORY OF LEFT HEART CATHETERIZATION: Chronic | ICD-10-CM

## 2021-09-07 DIAGNOSIS — Z95.2 S/P TAVR (TRANSCATHETER AORTIC VALVE REPLACEMENT): ICD-10-CM

## 2021-09-07 DIAGNOSIS — I10 ESSENTIAL HYPERTENSION: Chronic | ICD-10-CM

## 2021-09-07 PROCEDURE — 99214 OFFICE O/P EST MOD 30 MIN: CPT | Performed by: INTERNAL MEDICINE

## 2021-09-07 ASSESSMENT — ENCOUNTER SYMPTOMS
DIZZINESS: 0
SORE THROAT: 0
FALLS: 0
FOCAL WEAKNESS: 0
SHORTNESS OF BREATH: 0
FEVER: 0
ABDOMINAL PAIN: 0
COUGH: 0
NAUSEA: 0
PALPITATIONS: 0
PND: 0
CLAUDICATION: 0
BLURRED VISION: 0
BRUISES/BLEEDS EASILY: 0
CHILLS: 0
WEAKNESS: 0

## 2021-09-07 ASSESSMENT — FIBROSIS 4 INDEX: FIB4 SCORE: 1.8

## 2021-09-07 NOTE — PROGRESS NOTES
"Chief Complaint   Patient presents with   • Aortic Stenosis     Dx: Moderate aortic stenosis       Subjective     Tiffany Louis is a 76 y.o. female who presents today for follow-up of her history of aortic stenosis status post TAVR this year and hypertension    She did well with TAVR    She did have a mass on CT scan and did bronchoscopy she follows closely with oncology for history of breast cancer as well    Past Medical History:   Diagnosis Date   • Anemia    • Anesthesia     \"very low blood pressure\"   • Aortic valve stenosis, severe    • Arthritis     osteo, \"all over\"   • Bilateral carotid artery stenosis    • Breast cancer (HCC)     left   • Breath shortness     from aortic stenosis   • Cancer (HCC)     skin   • Cancer ([facility]) 2015    breast   • Dental disorder     upper implants front teeth   • Depression    • Essential hypertension    • GERD (gastroesophageal reflux disease)    • Heart murmur    • Joint replacement     bilateral knees and right hip   • Nasal drainage    • Obesity    • Osteoporosis    • Psychiatric problem     anxiety/depression   • Renal disorder 06/11/2020    kidney stone   • Restless leg syndrome    • Sleep apnea     CPAP   • Snoring    • Umbilical hernia 10/2016   • Unspecified urinary incontinence    • Urinary bladder disorder 6/21/2017    reports freq infections but none in the past year; on ABX, + bacteria in \"gut\"     Past Surgical History:   Procedure Laterality Date   • TRANSCATHETER AORTIC VALVE REPLACEMENT  7/20/2020    Procedure: REPLACEMENT, AORTIC VALVE, TRANSCATHETER;  Surgeon: Mukesh Reid M.D.;  Location: SURGERY [facility];  Service: Cardiac   • NEHA  7/20/2020    Procedure: ECHOCARDIOGRAM, TRANSESOPHAGEAL-;  Surgeon: Mukesh Reid M.D.;  Location: SURGERY [facility];  Service: Cardiac   • LUNG BIOPSY OPEN Right 06/2020   • LATISSIMUS FLAP Left 6/27/2017    Procedure: LATISSIMUS FLAP W/INSERTION OF IMPLANT;  Surgeon: Johnny Flannery M.D.;  " Location: [facility];  Service:    • BREAST IMPLANT REMOVAL Left 6/27/2017    Procedure: BREAST IMPLANT REMOVAL;  Surgeon: Johnny Flannery M.D.;  Location: [facility];  Service:    • CAPSULECTOMY  4/13/2017    and debridement left breast   • BOWEL RESECTION  12/28/2016   • BREAST RECONSTRUCTION Bilateral 11/29/2016    Procedure: BREAST RECONSTRUCTION;  Surgeon: Johnny Flannery M.D.;  Location: [facility];  Service:    • LATISSIMUS FLAP Right 11/29/2016    Procedure: LATISSIMUS FLAP W/ IMPLANT;  Surgeon: Johnny Flannery M.D.;  Location: [facility];  Service:    • TISSUE EXPANDER PLACE/REMOVE Left 11/29/2016    Procedure: TISSUE EXPANDER REMOVE - REMOVAL AND INSERTION OF IMPLANT;  Surgeon: Johnny Flannery M.D.;  Location: [facility];  Service:    • CAPSULECTOMY Bilateral 11/29/2016    Procedure: CAPSULECTOMY;  Surgeon: Johnny Flannery M.D.;  Location: [facility];  Service:    • OTHER SURGICAL PROCEDURE Right 7/2016    debridement and breast expander placement   • BREAST IMPLANT REMOVAL Right 1/6/2016    Procedure: BREAST IMPLANT REMOVAL, placement of drain;  Surgeon: Jon Leblanc M.D.;  Location: [facility];  Service:    • OTHER SURGICAL PROCEDURE Right 1/2016     second debridement right breast   • CATH PLACEMENT Right 9/8/2015    Procedure: CATH PLACEMENT PORT;  Surgeon: Vargas Hyde M.D.;  Location: [facility];  Service:    • OTHER SURGICAL PROCEDURE  9/8/2015    Port placement for chemo   • MASTECTOMY MODIFIED RADICAL Left 8/10/2015    Procedure: MASTECTOMY MODIFIED RADICAL;  Surgeon: Vargas Hyde M.D.;  Location: [facility];  Service:    • MASTECTOMY Right 8/10/2015    Procedure: MASTECTOMY SIMPLE;  Surgeon: Vargas Hyde M.D.;  Location: [facility];  Service:    • NODE BIOPSY SENTINEL Left 8/10/2015    Procedure: NODE BIOPSY SENTINEL;  Surgeon: Vargas PAZ  "SONJA Hyde;  Location: SURGERY [facility];  Service:    • BREAST RECONSTRUCTION Bilateral 8/10/2015    Procedure: BREAST RECONSTRUCTION VIA;  Surgeon: Johnny Flannery M.D.;  Location: SURGERY [facility];  Service:    • TISSUE EXPANDER PLACE/REMOVE Bilateral 8/10/2015    Procedure: TISSUE EXPANDER PLACE/REMOVE & POSSIBLE ALLODERM;  Surgeon: Johnny Flannery M.D.;  Location: SURGERY [facility];  Service:    • ROTATOR CUFF REPAIR Right 2009    right   • HIP ARTHROPLASTY TOTAL Right 2008    Hip Replacement, Total, right   • HAND SURGERY Right 2/12/07    joint Right Thumb   • ABDOMINOPLASTY  10/28/04   • GASTRIC BYPASS LAPAROSCOPIC  10/10/01   • KNEE ARTHROPLASTY TOTAL Bilateral 1/24/00    bilateral knees   • OTHER  9/99    tumor exc Right hip   • INGRID BY LAPAROSCOPY  9/97   • BLADDER SUSPENSION  12/93   • HYSTERECTOMY, TOTAL ABDOMINAL  5/93    rectal, bladder repair   • EAR MIDDLE EXPLORATION Left 4/90    Left   • BREAST BIOPSY Left 6/85   • TUBAL LIGATION  3/84   • ARTHROSCOPY, KNEE     • CHOLECYSTECTOMY     • COLON RESECTION     • KNEE ARTHROSCOPY Left 1983, 12/97    Left   • KNEE ARTHROSCOPY Right 6/88, 12/91, 11/97    Right   • OTHER  5/97, 4/05/05    vaginal repair   • OTHER SURGICAL PROCEDURE  4/06, 10/06    bilateral \"arm lift\"   • SLEEVE,ARLEEN VASO THIGH     • TONSILLECTOMY       Family History   Problem Relation Age of Onset   • Kidney Disease Mother    • Heart Failure Father      Social History     Socioeconomic History   • Marital status:      Spouse name: Not on file   • Number of children: Not on file   • Years of education: Not on file   • Highest education level: Not on file   Occupational History   • Not on file   Tobacco Use   • Smoking status: Never Smoker   • Smokeless tobacco: Never Used   Vaping Use   • Vaping Use: Never used   Substance and Sexual Activity   • Alcohol use: Yes     Comment: reports 1 /week   • Drug use: No   • Sexual activity: Not on file   Other Topics " Concern   • Not on file   Social History Narrative   • Not on file     Social Determinants of Health     Financial Resource Strain:    • Difficulty of Paying Living Expenses:    Food Insecurity:    • Worried About Running Out of Food in the Last Year:    • Ran Out of Food in the Last Year:    Transportation Needs:    • Lack of Transportation (Medical):    • Lack of Transportation (Non-Medical):    Physical Activity:    • Days of Exercise per Week:    • Minutes of Exercise per Session:    Stress:    • Feeling of Stress :    Social Connections:    • Frequency of Communication with Friends and Family:    • Frequency of Social Gatherings with Friends and Family:    • Attends [employer] Services:    • Active Member of Clubs or Organizations:    • Attends Club or Organization Meetings:    • Marital Status:    Intimate Partner Violence:    • Fear of Current or Ex-Partner:    • Emotionally Abused:    • Physically Abused:    • Sexually Abused:      Allergies   Allergen Reactions   • Ampicillin Shortness of Breath and Swelling     Tolerated Cefazolin on 08/10/15   • Clindamycin Shortness of Breath and Rash     .   • Levaquin Shortness of Breath   • Penicillins Shortness of Breath and Rash     Tolerated Ancef 08/10/15   • Amlodipine      swelling   • Demerol Itching     Itching and rash     • Lisinopril Cough     Outpatient Encounter Medications as of 9/7/2021   Medication Sig Dispense Refill   • fluticasone (FLONASE ALLERGY RELIEF) 50 MCG/ACT nasal spray 1-2 spray 1-2 times a day 16 g 11   • MYRBETRIQ 50 MG TABLET SR 24 HR      • losartan-hydrochlorothiazide (HYZAAR) 100-12.5 MG per tablet Take 1 tablet by mouth every day. 90 tablet 3   • ferrous sulfate 325 (65 Fe) MG tablet Take 325 mg by mouth every day.     • doxylamine (UNISOM) 25 MG Tab tablet Take 25 mg by mouth every bedtime.     • polyethylene glycol/lytes (MIRALAX) Pack Take 17 g by mouth every day.     • triamcinolone acetonide (KENALOG) 0.1 % Cream Apply  to  "affected area(s) as needed.     • Desvenlafaxine Succinate (PRISTIQ) 100 MG TABLET SR 24 HR Take 1 Tab by mouth every morning. Indications: Major Depressive Disorder     • folic acid (FOLATE) 400 MCG tablet Take 800 mcg by mouth every day.     • Coenzyme Q10 (COQ10 PO) Take 300 mg by mouth every day.     • Cyanocobalamin (VITAMIN B-12) 1000 MCG Tab Take 3,000 mcg by mouth every day.     • aspirin 81 MG tablet Take 81 mg by mouth every day.     • acetaminophen (TYLENOL) 500 MG Tab Take 500-1,000 mg by mouth every 6 hours as needed.     • aripiprazole (ABILIFY) 5 MG tablet Take 5 mg by mouth every morning. Indications: Major Depressive Disorder     • alprazolam (XANAX) 0.5 MG TABS Take 0.5 mg by mouth as needed for Anxiety. Indications: Feeling Anxious     • Probiotic Product (PROBIOTIC DAILY PO) Take 1 Cap by mouth every day.     • fluvoxAMINE (LUVOX) 50 MG Tab Take 100 mg by mouth every morning. Indications: Major Depressive Disorder, Obsessive Compulsive Disorder (Patient not taking: Reported on 9/7/2021)       No facility-administered encounter medications on file as of 9/7/2021.     Review of Systems   Constitutional: Negative for chills and fever.   HENT: Negative for sore throat.    Eyes: Negative for blurred vision.   Respiratory: Negative for cough and shortness of breath.    Cardiovascular: Negative for chest pain, palpitations, claudication, leg swelling and PND.   Gastrointestinal: Negative for abdominal pain and nausea.   Musculoskeletal: Negative for falls and joint pain.   Skin: Negative for rash.   Neurological: Negative for dizziness, focal weakness and weakness.   Endo/Heme/Allergies: Does not bruise/bleed easily.              Objective     /60 (BP Location: Left arm, Patient Position: Sitting, BP Cuff Size: Adult)   Pulse 76   Resp 16   Ht 1.626 m (5' 4\")   Wt 63.6 kg (140 lb 3.2 oz)   SpO2 92%   BMI 24.07 kg/m²     Physical Exam  Constitutional:       General: She is not in acute " distress.     Appearance: She is not diaphoretic.   Eyes:      General: No scleral icterus.  Neck:      Vascular: No JVD.   Cardiovascular:      Rate and Rhythm: Normal rate.      Heart sounds: Normal heart sounds. No murmur heard.   No friction rub. No gallop.    Pulmonary:      Effort: No respiratory distress.      Breath sounds: No wheezing or rales.   Abdominal:      General: Bowel sounds are normal.      Palpations: Abdomen is soft.   Skin:     Findings: No rash.   Neurological:      Mental Status: She is alert.            We reviewed in person the most recent labs  Recent Results (from the past 5040 hour(s))   URINE CULTURE(NEW)    Collection Time: 03/12/21  9:15 AM    Specimen: Urine   Result Value Ref Range    Significant Indicator NEG     Source UR     Site -     Culture Result No growth at 48 hours.    EC-ECHOCARDIOGRAM COMPLETE W/O CONT    Collection Time: 07/12/21  1:07 PM   Result Value Ref Range    Eject.Frac. MOD BP 56.5     Eject.Frac. MOD 4C 56.91     Eject.Frac. MOD 2C 61.14     Left Ventrical Ejection Fraction 60        Reviewed the images of her echocardiogram and labs  Assessment & Plan     1. S/P TAVR (transcatheter aortic valve replacement)     2. Essential hypertension         Medical Decision Making: Today's Assessment/Status/Plan:        It was my pleasure to meet with Ms. Louis.    We addressed the management of hypertension at today's visit. Blood pressure is well controlled.  We specifically assessed the labs on hypertension treatment    We addressed the management of aortic valve replacement at today's visit. She understands the importance of antiplatelet therapy as well as dental prophylaxis for the health of their aortic valve replacement.  We have also ensured that she has appropriate echocardiogram follow-up.    I will see Ms. Louis back in 1 year time and encouraged her to follow up with us over the phone or electronically using my MyChart as issues arise.    It is my pleasure  to participate in the care of Ms. Louis.  Please do not hesitate to contact me with questions or concerns.    Evelio Alfaro MD PhD [facility]  Cardiologist [facility]    Please note that this dictation was created using voice recognition software. There may be errors I did not discover before finalizing the note.

## 2021-09-07 NOTE — PATIENT INSTRUCTIONS
We addressed the management of aortic valve replacement at today's visit. She understands the importance of antiplatelet therapy as well as dental prophylaxis for the health of their aortic valve replacement.  We have also ensured that she has appropriate echocardiogram follow-up.      Work on at least 1.5 - 5 hours a week of moderate exercise (typical brisk walking or similar activity)    If you have had a heart attack, stent, bypass or reduced heart strength (EF <35%): cardiac rehab may reduce your risk of dying by 13-24% and need to go to the hospital by 30% within the first year (1)    Please look into the following diets and incorporate them into your diet    LOW SALT DIET   KEEP YOUR SODIUM EQUAL TO CALORIES AND NO MORE THAN DOUBLE THE CALORIES FOR A LOW SALT DIET    Cardiosmart.org - great resource for American College of Cardiology on heart disease prevention and treatment    FOR TREATMENT OR PREVENTION OF CORONARY ARTERY DISEASE  These three programs are approved by Medicare/Insurers for those with heart disease  [facility] - [facility]  Dr. Cerda[facility] for Reversing Heart Disease - Efren Chong's Cardiologist vegetarian-based  [facility] - [place]-based mind-body Program    This is a commonly referenced Program  Dr Yarbrough - [facility] [place] (book and documentary) - vegetarian-based    FOR TREATMENT OF BLOOD PRESSURE  DASH DIET - American Heart Association for treatment of HYPERTENSION    FOR TREATMENT OF BAD CHOLESTEROL/FATS  REDUCE PROCESSED SUGAR AS MUCH AS POSSIBLE  INCREASE WHOLE GRAINS/VEGETABLES  INCREASE FIBER    Lowering total cholesterol and LDL (bad) cholesterol:  - Eat leaner cuts of meat, or eliminate altogether if possible red meat, and frequently substitute fish or chicken.  - Limit saturated fat to no more than 7-10% of total calories no more than 10 g per day is recommended. Some sources of saturated fat include butter, animal  fats, hydrogenated vegetable fats and oils, many desserts, whole milk dairy products.  - Replaced saturated fats with polyunsaturated fats and monounsaturated fats. Foods high in monounsaturated fat include nuts, canola oil, avocados, and olives.  - Limit trans fat (processed foods) and replaced with fresh fruits and vegetables  - Recommend nonfat dairy products  - Increase substantially the amount of soluble fiber intake (legumes such as beans, fruit, whole grains).  - Consider nutritional supplements: plant sterile spreads such as Benecol, fish oil,  flaxseed oil, omega-3 acids capsules 1000 mg twice a day, or viscous fiber such as Metamucil  - Attain ideal weight and regular exercise (at least 30 minutes per day of moderate exercise)  ASK ABOUT STATIN OR NON STATIN MEDICATION TO REDUCE YOUR LDL AND HEART RISK    Lowering triglycerides:  - Reduce intake of simple sugar: Desserts, candy, pastries, honey, sodas, sugared cereals, yogurt, Gatorade, sports bars, canned fruit, smoothies, fruit juice, coffee drinks  - Reduced intake of refined starches: Refined Pasta, most bread  - Reduce or abstain from alcohol  - Increase omega-3 fatty acids: [place], Trout, Mackerel, Herring, [place]ac[place] tuna and supplements  - Attain ideal weight and regular exercise (at least 30 minutes per day of moderate exercise)  ASK ABOUT PURIFIED OMEGA 3 EPA or FISH OIL TO REDUCE YOUR TG AND HEART RISK    Elevating HDL (good) cholesterol:  - Increase physical activity  - Increase omega-3 fatty acids and supplements as listed above  - Incorporating appropriate amounts of monounsaturated fats such as nuts, olive oil, canola oil, avocados, olives  - Stop smoking  - Attain ideal weight and regular exercise (at least 30 minutes per day of moderate exercise)

## 2021-12-01 ENCOUNTER — APPOINTMENT (OUTPATIENT)
Dept: SLEEP MEDICINE | Facility: MEDICAL CENTER | Age: 76
End: 2021-12-01
Payer: MEDICARE

## 2021-12-10 ENCOUNTER — TELEPHONE (OUTPATIENT)
Dept: CARDIOLOGY | Facility: MEDICAL CENTER | Age: 76
End: 2021-12-10

## 2021-12-10 DIAGNOSIS — Z13.220 SCREENING FOR LIPID DISORDERS: ICD-10-CM

## 2021-12-11 NOTE — TELEPHONE ENCOUNTER
Review of her chart recommends that she may benefit from statin therapy to reduce the bad cholesterol.    We should check her lipids    Please call her and see if she would like to take atorvastatin 20 mg daily to reduced this risk by at least 20%    This is of course in addition to heart healthy lifestyle, diet and exercise.  Work on at least 1.5 hours a week of moderate exercise (typical brisk walking or similar activity)  LOW SALT DIET  KEEP YOUR SODIUM EQUAL TO CALORIES AND NO MORE THAN DOUBLE THE CALORIES FOR A LOW SALT DIET  Cardiosmart.org - great resource for American College of Cardiology on heart disease prevention and treatment    Thank you    No results found for: CHOLSTRLTOT, LDL, HDL, TRIGLYCERIDE    Lab Results   Component Value Date/Time    SODIUM 136 07/21/2020 05:07 AM    POTASSIUM 3.8 07/21/2020 05:07 AM    CHLORIDE 99 07/21/2020 05:07 AM    CO2 25 07/21/2020 05:07 AM    GLUCOSE 114 (H) 07/21/2020 05:07 AM    BUN 13 07/21/2020 05:07 AM    CREATININE 0.66 07/21/2020 05:07 AM    CREATININE 1.1 03/17/2008 03:05 PM     Lab Results   Component Value Date/Time    ALKPHOSPHAT 84 07/21/2020 05:07 AM    ASTSGOT 17 07/21/2020 05:07 AM    ALTSGPT 15 07/21/2020 05:07 AM    TBILIRUBIN 0.5 07/21/2020 05:07 AM

## 2021-12-14 NOTE — TELEPHONE ENCOUNTER
Called pt to review MD recommendations.  Pt requesting to have lab results prior to proceeding to statin medication.  Pt requesting to fax [facility] slip to [facility] [phone] and plans to have it completed tomorrow or Wednesday.  Lab slip faxed to above number.  Pt verbalizes understanding and states no other concerns or questions at this time.  Pt is appreciative of information given.    Completed status received from fax and confirmation receipt sent to scanning via [employer], completed status received.  Lab slip mailed to pt mailing address.

## 2021-12-15 ENCOUNTER — TELEPHONE (OUTPATIENT)
Dept: CARDIOLOGY | Facility: MEDICAL CENTER | Age: 76
End: 2021-12-15

## 2021-12-15 DIAGNOSIS — Z51.81 ENCOUNTER FOR MONITORING STATIN THERAPY: ICD-10-CM

## 2021-12-15 DIAGNOSIS — Z79.899 ENCOUNTER FOR MONITORING STATIN THERAPY: ICD-10-CM

## 2021-12-15 NOTE — TELEPHONE ENCOUNTER
Pt would like a call back to get results from her lab work completed today.     Tiffany  - [phone]    Thank you   Margie SÁNCHEZ

## 2021-12-15 NOTE — TELEPHONE ENCOUNTER
CW  Please send a new lab request to [facility] in [place] at fax#[phone] for the lipid profile the ICD code  Z13.220 is not working so that it can be covered by Medicare.   Please reach out.    Thank you,  Griselda DEJESUS

## 2021-12-16 DIAGNOSIS — Z51.81 ENCOUNTER FOR MONITORING STATIN THERAPY: ICD-10-CM

## 2021-12-16 DIAGNOSIS — Z79.899 ENCOUNTER FOR MONITORING STATIN THERAPY: ICD-10-CM

## 2021-12-16 NOTE — TELEPHONE ENCOUNTER
Reviewed recent fax received and received fax from [facility]. Labs re-ordered with alternative ICD10 code and faxed to [phone], completed status.    Fax confirmation receipt sent to [facility] for reference, completed status received.    Called pt to review concerns.  She states labs were drawn and she has the results.  Advised pt to attach results to [employer] message.  Reassurance given if she is unable to attach this RN will follow up with [facility].  She verbalizes understanding and states no other concerns or questions at this time.  Pt is appreciative of information given.    Awaiting for results to be received.

## 2021-12-17 ENCOUNTER — TELEPHONE (OUTPATIENT)
Dept: CARDIOLOGY | Facility: MEDICAL CENTER | Age: 76
End: 2021-12-17

## 2021-12-17 ENCOUNTER — PATIENT MESSAGE (OUTPATIENT)
Dept: CARDIOLOGY | Facility: MEDICAL CENTER | Age: 76
End: 2021-12-17

## 2021-12-17 NOTE — TELEPHONE ENCOUNTER
LDL is great no need for statin I had to enter in allergy section for insurance reasons    Please let her know    It is my pleasure to participate in the care of Ms. Louis.  Please do not hesitate to contact me with questions or concerns.    Evelio Alfaro MD PhD [facility]  Cardiologist [facility]    Please note that this dictation was created using voice recognition software. There may be errors I did not discover before finalizing the note.     12/17/2021  9:29 AM

## 2021-12-18 NOTE — TELEPHONE ENCOUNTER
Attempted to call pt, unable to reach.  Left detailed voicemail regarding MD recommendations and to return this call at their earliest convenience.    [employer] message sent to pt regarding MD recommendations.

## 2021-12-20 ENCOUNTER — TELEPHONE (OUTPATIENT)
Dept: CARDIOLOGY | Facility: MEDICAL CENTER | Age: 76
End: 2021-12-20

## 2021-12-20 NOTE — TELEPHONE ENCOUNTER
CW    Patient would like a call back in regards to a lipid panel request of a lab order she received. She completed one lab order and is not sure if she is needing to do another test?    Thank You  Marlin BRADY

## 2021-12-21 NOTE — TELEPHONE ENCOUNTER
Spoke to patient over phone. Patient states that she received an order in the mail to have a Lipid panel drawn. Explained to patient that physical copy of lab order had been mailed to her in the event that [facility] did not receive faxed copy. Let patient know she can disregard order. Patient aware that we received recent lipid panel results. Reiterated CW's review of lab results. Patient verbalizes understanding and has no additional questions at this time.

## 2022-01-13 ENCOUNTER — APPOINTMENT (OUTPATIENT)
Dept: SLEEP MEDICINE | Facility: MEDICAL CENTER | Age: 77
End: 2022-01-13
Payer: MEDICARE

## 2022-01-13 ENCOUNTER — APPOINTMENT (OUTPATIENT)
Dept: ADMISSIONS | Facility: MEDICAL CENTER | Age: 77
End: 2022-01-13
Payer: MEDICARE

## 2022-01-17 ENCOUNTER — TELEPHONE (OUTPATIENT)
Dept: CARDIOLOGY | Facility: MEDICAL CENTER | Age: 77
End: 2022-01-17

## 2022-01-17 ENCOUNTER — OFFICE VISIT (OUTPATIENT)
Dept: SLEEP MEDICINE | Facility: MEDICAL CENTER | Age: 77
End: 2022-01-17
Payer: MEDICARE

## 2022-01-17 VITALS
RESPIRATION RATE: 16 BRPM | HEART RATE: 91 BPM | WEIGHT: 152 LBS | BODY MASS INDEX: 25.95 KG/M2 | SYSTOLIC BLOOD PRESSURE: 132 MMHG | HEIGHT: 64 IN | DIASTOLIC BLOOD PRESSURE: 80 MMHG | OXYGEN SATURATION: 94 %

## 2022-01-17 DIAGNOSIS — G47.33 OSA (OBSTRUCTIVE SLEEP APNEA): Primary | ICD-10-CM

## 2022-01-17 PROCEDURE — 99213 OFFICE O/P EST LOW 20 MIN: CPT | Performed by: STUDENT IN AN ORGANIZED HEALTH CARE EDUCATION/TRAINING PROGRAM

## 2022-01-17 RX ORDER — ROTIGOTINE 2 MG/24H
1 PATCH, EXTENDED RELEASE TRANSDERMAL DAILY
Status: ON HOLD | COMMUNITY
Start: 2022-01-05 | End: 2022-10-28

## 2022-01-17 ASSESSMENT — FIBROSIS 4 INDEX: FIB4 SCORE: 1.8

## 2022-01-17 NOTE — PROGRESS NOTES
"[facility] Follow-up Visit    CC: Follow-up for RICHMOND management      HPI:  Tiffany Louis is a 76 y.o.female  with hypertension, anxiety, history of TAVR, breast cancer and obstructive sleep apnea on CPAP.  Presents today to follow-up for RICHMOND management.    Since last visit she has received new CPAP machine.  She has been using her new machine every night.  Overall finds this comfortable and quieter than her previous machine.  With using machine she feels she has rested and has more refreshing sleep.  There are occasions where she will not use her machine with travel.  But when at home she uses her machine nightly.    DME provider:[facility]   Device: Airsense 11 Auto  Mask: Nasal cradle   Aerophagia: No   Snoring: No   Dry mouth: Yes  Leak: No   Skin irritation: No   Chin strap: No     Sleep History  She is a long-standing history of sleep apnea initially diagnosed in 2009.  PSG indicates severe sleep apnea with an AHI of 32.9, minimum oxygen saturation of 84 %.    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/2021   • S/P TAVR (transcatheter aortic valve replacement) 07/20/2020   • RICHMOND (obstructive sleep apnea) 07/18/2018   • Insomnia 07/18/2018   • Personal history of malignant neoplasm of breast 11/29/2016       Past Medical History:   Diagnosis Date   • Anemia    • Anesthesia     \"very low blood pressure\"   • Aortic valve stenosis, severe    • Arthritis     osteo, \"all over\"   • Bilateral carotid artery stenosis    • Breast cancer (HCC)     left   • Breath shortness     from aortic stenosis   • Cancer (HCC)     skin   • Cancer (HCC) 2015    breast   • Dental disorder     upper implants front teeth   • Depression    • Essential hypertension    • GERD (gastroesophageal reflux disease)    • Heart murmur    • History of left heart catheterization - " "normal 2020 prior to TAVR    • Joint replacement     bilateral knees and right hip   • Nasal drainage    • Obesity    • Osteoporosis    • Psychiatric problem     anxiety/depression   • Renal disorder 06/11/2020    kidney stone   • Restless leg syndrome    • Sleep apnea     CPAP   • Snoring    • Umbilical hernia 10/2016   • Unspecified urinary incontinence    • Urinary bladder disorder 6/21/2017    reports freq infections but none in the past year; on ABX, + bacteria in \"gut\"        Past Surgical History:   Procedure Laterality Date   • TRANSCATHETER AORTIC VALVE REPLACEMENT  7/20/2020    Procedure: REPLACEMENT, AORTIC VALVE, TRANSCATHETER;  Surgeon: Mukesh Reid M.D.;  Location: [facility];  Service: Cardiac   • NEHA  7/20/2020    Procedure: ECHOCARDIOGRAM, TRANSESOPHAGEAL-;  Surgeon: Mukesh Reid M.D.;  Location: [facility];  Service: Cardiac   • LUNG BIOPSY OPEN Right 06/2020   • LATISSIMUS FLAP Left 6/27/2017    Procedure: LATISSIMUS FLAP W/INSERTION OF IMPLANT;  Surgeon: Johnny Flannery M.D.;  Location: [facility];  Service:    • BREAST IMPLANT REMOVAL Left 6/27/2017    Procedure: BREAST IMPLANT REMOVAL;  Surgeon: Johnny Flannery M.D.;  Location: [facility];  Service:    • CAPSULECTOMY  4/13/2017    and debridement left breast   • BOWEL RESECTION  12/28/2016   • BREAST RECONSTRUCTION Bilateral 11/29/2016    Procedure: BREAST RECONSTRUCTION;  Surgeon: Johnny Flannery M.D.;  Location: [facility];  Service:    • LATISSIMUS FLAP Right 11/29/2016    Procedure: LATISSIMUS FLAP W/ IMPLANT;  Surgeon: Johnny Flannery M.D.;  Location: [facility];  Service:    • TISSUE EXPANDER PLACE/REMOVE Left 11/29/2016    Procedure: TISSUE EXPANDER REMOVE - REMOVAL AND INSERTION OF IMPLANT;  Surgeon: Johnny Flannery M.D.;  Location: [facility];  Service:    • CAPSULECTOMY Bilateral 11/29/2016    Procedure: " CAPSULECTOMY;  Surgeon: Johnny Flannery M.D.;  Location: [facility];  Service:    • OTHER SURGICAL PROCEDURE Right 7/2016    debridement and breast expander placement   • BREAST IMPLANT REMOVAL Right 1/6/2016    Procedure: BREAST IMPLANT REMOVAL, placement of drain;  Surgeon: Jon Leblanc M.D.;  Location: [facility];  Service:    • OTHER SURGICAL PROCEDURE Right 1/2016     second debridement right breast   • CATH PLACEMENT Right 9/8/2015    Procedure: CATH PLACEMENT PORT;  Surgeon: Vargas Hyde M.D.;  Location: [facility];  Service:    • OTHER SURGICAL PROCEDURE  9/8/2015    Port placement for chemo   • MASTECTOMY MODIFIED RADICAL Left 8/10/2015    Procedure: MASTECTOMY MODIFIED RADICAL;  Surgeon: Vargas Hyde M.D.;  Location: [facility];  Service:    • MASTECTOMY Right 8/10/2015    Procedure: MASTECTOMY SIMPLE;  Surgeon: Vargas Hyde M.D.;  Location: [facility];  Service:    • NODE BIOPSY SENTINEL Left 8/10/2015    Procedure: NODE BIOPSY SENTINEL;  Surgeon: Vargas Hyde M.D.;  Location: [facility];  Service:    • BREAST RECONSTRUCTION Bilateral 8/10/2015    Procedure: BREAST RECONSTRUCTION VIA;  Surgeon: Johnny Flannery M.D.;  Location: [facility];  Service:    • TISSUE EXPANDER PLACE/REMOVE Bilateral 8/10/2015    Procedure: TISSUE EXPANDER PLACE/REMOVE & POSSIBLE ALLODERM;  Surgeon: Johnny Flannery M.D.;  Location: [facility];  Service:    • ROTATOR CUFF REPAIR Right 2009    right   • HIP ARTHROPLASTY TOTAL Right 2008    Hip Replacement, Total, right   • HAND SURGERY Right 2/12/07    joint Right Thumb   • ABDOMINOPLASTY  10/28/04   • GASTRIC BYPASS LAPAROSCOPIC  10/10/01   • KNEE ARTHROPLASTY TOTAL Bilateral 1/24/00    bilateral knees   • OTHER  9/99    tumor exc Right hip   • INGRID BY LAPAROSCOPY  9/97   • BLADDER SUSPENSION  12/93   • HYSTERECTOMY, TOTAL ABDOMINAL  5/93    rectal,  "bladder repair   • EAR MIDDLE EXPLORATION Left 4/90    Left   • BREAST BIOPSY Left 6/85   • TUBAL LIGATION  3/84   • ARTHROSCOPY, KNEE     • CHOLECYSTECTOMY     • COLON RESECTION     • KNEE ARTHROSCOPY Left 1983, 12/97    Left   • KNEE ARTHROSCOPY Right 6/88, 12/91, 11/97    Right   • OTHER  5/97, 4/05/05    vaginal repair   • OTHER SURGICAL PROCEDURE  4/06, 10/06    bilateral \"arm lift\"   • SLEEVE,ARLEEN VASO THIGH     • TONSILLECTOMY         Family History   Problem Relation Age of Onset   • Kidney Disease Mother    • Heart Failure Father        Social History     Socioeconomic History   • Marital status:      Spouse name: Not on file   • Number of children: Not on file   • Years of education: Not on file   • Highest education level: Not on file   Occupational History   • Not on file   Tobacco Use   • Smoking status: Never Smoker   • Smokeless tobacco: Never Used   Vaping Use   • Vaping Use: Never used   Substance and Sexual Activity   • Alcohol use: Yes     Comment: reports 1 /week   • Drug use: No   • Sexual activity: Not on file   Other Topics Concern   • Not on file   Social History Narrative   • Not on file     Social Determinants of Health     Financial Resource Strain:    • Difficulty of Paying Living Expenses: Not on file   Food Insecurity:    • Worried About Running Out of Food in the Last Year: Not on file   • Ran Out of Food in the Last Year: Not on file   Transportation Needs:    • Lack of Transportation (Medical): Not on file   • Lack of Transportation (Non-Medical): Not on file   Physical Activity:    • Days of Exercise per Week: Not on file   • Minutes of Exercise per Session: Not on file   Stress:    • Feeling of Stress : Not on file   Social Connections:    • Frequency of Communication with Friends and Family: Not on file   • Frequency of Social Gatherings with Friends and Family: Not on file   • Attends Yazidism Services: Not on file   • Active Member of Clubs or Organizations: Not on file "   • Attends Club or Organization Meetings: Not on file   • Marital Status: Not on file   Intimate Partner Violence:    • Fear of Current or Ex-Partner: Not on file   • Emotionally Abused: Not on file   • Physically Abused: Not on file   • Sexually Abused: Not on file   Housing Stability:    • Unable to Pay for Housing in the Last Year: Not on file   • Number of Places Lived in the Last Year: Not on file   • Unstable Housing in the Last Year: Not on file       Current Outpatient Medications   Medication Sig Dispense Refill   • fluticasone (FLONASE ALLERGY RELIEF) 50 MCG/ACT nasal spray 1-2 spray 1-2 times a day 16 g 11   • MYRBETRIQ 50 MG TABLET SR 24 HR      • losartan-hydrochlorothiazide (HYZAAR) 100-12.5 MG per tablet Take 1 tablet by mouth every day. 90 tablet 3   • ferrous sulfate 325 (65 Fe) MG tablet Take 325 mg by mouth every day.     • doxylamine (UNISOM) 25 MG Tab tablet Take 25 mg by mouth every bedtime.     • polyethylene glycol/lytes (MIRALAX) Pack Take 17 g by mouth every day.     • triamcinolone acetonide (KENALOG) 0.1 % Cream Apply  to affected area(s) as needed.     • Desvenlafaxine Succinate (PRISTIQ) 100 MG TABLET SR 24 HR Take 1 Tab by mouth every morning. Indications: Major Depressive Disorder     • folic acid (FOLATE) 400 MCG tablet Take 800 mcg by mouth every day.     • Coenzyme Q10 (COQ10 PO) Take 300 mg by mouth every day.     • Cyanocobalamin (VITAMIN B-12) 1000 MCG Tab Take 3,000 mcg by mouth every day.     • aspirin 81 MG tablet Take 81 mg by mouth every day.     • acetaminophen (TYLENOL) 500 MG Tab Take 500-1,000 mg by mouth every 6 hours as needed.     • aripiprazole (ABILIFY) 5 MG tablet Take 5 mg by mouth every morning. Indications: Major Depressive Disorder     • alprazolam (XANAX) 0.5 MG TABS Take 0.5 mg by mouth as needed for Anxiety. Indications: Feeling Anxious     • Probiotic Product (PROBIOTIC DAILY PO) Take 1 Cap by mouth every day.     • NEUPRO 2 MG/24HR patch        No  "current facility-administered medications for this visit.        ALLERGIES: Ampicillin, Clindamycin, Levaquin, Penicillins, Amlodipine, Atorvastatin, Demerol, and Lisinopril    ROS  Constitutional: Denies fever, chills, sweats,  weight loss, fatigue  Cardiovascular: Denies chest pain, tightness, palpitations, swelling in legs/feet  Respiratory: Denies shortness of breath, cough, sputum, wheezing, painful breathing   Sleep: per HPI  Gastrointestinal: Denies  difficulty swallowing, nausea, abdominal pain, diarrhea, constipation, heartburn.  Musculoskeletal: Denies painful joints, sore muscles,       PHYSICAL EXAM  /80 (BP Location: Left arm, Patient Position: Sitting, BP Cuff Size: Adult)   Pulse 91   Resp 16   Ht 1.626 m (5' 4\")   Wt 68.9 kg (152 lb)   SpO2 94%   BMI 26.09 kg/m²   Appearance: Well-nourished, well-developed, no acute distress  Eyes:  No scleral icterus , EOMI  ENMT: masked  Musculoskeletal:  Grossly normal; gait and station normal; digits and nails normal  Skin:  No rashes, petechiae, cyanosis  Neurologic: without focal signs; oriented to person, time, place, and purpose; judgement intact  Psychiatric:  No depression, anxiety, agitation      Medical Decision Making   Assessment and Plan  Tiffany Louis is a 76 y.o.female  with hypertension, anxiety, history of TAVR, breast cancer and obstructive sleep apnea on CPAP.  Presents today to follow-up for RICHMOND management.    The medical record was reviewed.      Obstructive sleep apnea  Compliance data reviewed showing 70% usage > 4hours in last 30  days. Average AHI 1.1 events/hour. Fixed 7 CWP. Pt continues to use and benefit from machine.      Patient is experiencing dry mouth with PAP usage.  Discussed changing humidity level which may help with dry mouth.  Additionally may consider Biotene or XyliMelts.     PLAN:   -Order placed for mask and supplies   -Advised to reach out via MyChart with questions     Has been advised to continue the " current  CPAP, clean equipment frequently, and get new mask and supplies as allowed by insurance and DME. Recommend an earlier appointment, if significant treatment barriers develop.    The risks of untreated sleep apnea were discussed with the patient at length. Patients with RICHMOND are at increased risk of cardiovascular disease including coronary artery disease, systemic arterial hypertension, pulmonary arterial hypertension, cardiac arrythmias, and stroke. The patient was advised to avoid driving a motor vehicle when drowsy.    Positive airway pressure will favorably impact many of the adverse conditions and effects provoked by RICHMOND.    Have advised the patient to follow up with the appropriate healthcare practitioners for all other medical problems and issues.    Return in about 1 year (around 1/17/2023).      Please note portions of this record was created using voice recognition software. I have made every reasonable attempt to correct obvious errors, but I expect that there are errors of grammar and possibly content I did not discover before finalizing the note.

## 2022-01-18 ENCOUNTER — PRE-ADMISSION TESTING (OUTPATIENT)
Dept: ADMISSIONS | Facility: MEDICAL CENTER | Age: 77
End: 2022-01-18
Attending: UROLOGY
Payer: MEDICARE

## 2022-01-18 NOTE — TELEPHONE ENCOUNTER
She can proceed with the proposed procedure or surgery, no modifiable cardiovascular risk, no further cardiac testing required, hold antiplatelet as necessary.    It is my pleasure to participate in the care of Ms. Louis.  Please do not hesitate to contact me with questions or concerns. [facility] is available 24/7 for consultative services at [phone] in the perioperative period.    Electronically Signed    Evelio Alfaro MD PhD [facility]  Cardiologist [facility]    Please note that this dictation was created using voice recognition software. There may be errors I did not discover before finalizing the note.

## 2022-01-18 NOTE — PREPROCEDURE INSTRUCTIONS
"Preadmit appointment: \" Preparing for your Procedure information\" sheet reviewed with patient with verbal and written instructions (emailed). Patient instructed to continue prescribed medications through the day before surgery, instructed to take the following medications the day of surgery per anesthesia protocol: Alprazolam if needed, Abilify, Pristiq  Verbal and written instructions on covid symptoms to watch for given to patient and patient instructed to call MD if any symptoms develop prior to surgery/procedure. Pt reports low blood pressure and low oxygen with anesthesia- pt advised to discuss with anesthesia day of surgery.  Pt reports she is covid vaccinated and will email copy of card to preadmit. METS >4  "

## 2022-01-18 NOTE — TELEPHONE ENCOUNTER
Received fax from [facility] (P: [phone] F: [phone]) requesting:    - cardiac clearance for upcoming INTERSTIM stage I lead placement and stage 2 generator placement OR lead removal if not working scheduled 1/26/2022, 2/8/2022.  - Anticoagulants hold 7 days prior to procedure.    Clearance request relayed to MD for advise.  Fax sent to scanning for reference via [employer], completed status received.

## 2022-01-19 ENCOUNTER — PRE-ADMISSION TESTING (OUTPATIENT)
Dept: ADMISSIONS | Facility: MEDICAL CENTER | Age: 77
End: 2022-01-19
Attending: UROLOGY
Payer: MEDICARE

## 2022-01-19 DIAGNOSIS — Z01.810 PRE-OPERATIVE CARDIOVASCULAR EXAMINATION: ICD-10-CM

## 2022-01-19 DIAGNOSIS — Z01.812 PRE-OPERATIVE LABORATORY EXAMINATION: ICD-10-CM

## 2022-01-19 LAB
ANION GAP SERPL CALC-SCNC: 14 MMOL/L (ref 7–16)
APPEARANCE UR: CLEAR
BASOPHILS # BLD AUTO: 0.5 % (ref 0–1.8)
BASOPHILS # BLD: 0.03 K/UL (ref 0–0.12)
BILIRUB UR QL STRIP.AUTO: NEGATIVE
BUN SERPL-MCNC: 10 MG/DL (ref 8–22)
CALCIUM SERPL-MCNC: 9.5 MG/DL (ref 8.5–10.5)
CHLORIDE SERPL-SCNC: 92 MMOL/L (ref 96–112)
CO2 SERPL-SCNC: 26 MMOL/L (ref 20–33)
COLOR UR: YELLOW
CREAT SERPL-MCNC: 0.6 MG/DL (ref 0.5–1.4)
EKG IMPRESSION: NORMAL
EOSINOPHIL # BLD AUTO: 0.2 K/UL (ref 0–0.51)
EOSINOPHIL NFR BLD: 3.3 % (ref 0–6.9)
ERYTHROCYTE [DISTWIDTH] IN BLOOD BY AUTOMATED COUNT: 45.6 FL (ref 35.9–50)
GLUCOSE SERPL-MCNC: 152 MG/DL (ref 65–99)
GLUCOSE UR STRIP.AUTO-MCNC: NEGATIVE MG/DL
HCT VFR BLD AUTO: 41.2 % (ref 37–47)
HGB BLD-MCNC: 13.6 G/DL (ref 12–16)
IMM GRANULOCYTES # BLD AUTO: 0.04 K/UL (ref 0–0.11)
IMM GRANULOCYTES NFR BLD AUTO: 0.7 % (ref 0–0.9)
INR PPP: 0.99 (ref 0.87–1.13)
KETONES UR STRIP.AUTO-MCNC: NEGATIVE MG/DL
LEUKOCYTE ESTERASE UR QL STRIP.AUTO: NEGATIVE
LYMPHOCYTES # BLD AUTO: 1.27 K/UL (ref 1–4.8)
LYMPHOCYTES NFR BLD: 21.2 % (ref 22–41)
MCH RBC QN AUTO: 29.1 PG (ref 27–33)
MCHC RBC AUTO-ENTMCNC: 33 G/DL (ref 33.6–35)
MCV RBC AUTO: 88 FL (ref 81.4–97.8)
MICRO URNS: NORMAL
MONOCYTES # BLD AUTO: 0.5 K/UL (ref 0–0.85)
MONOCYTES NFR BLD AUTO: 8.3 % (ref 0–13.4)
NEUTROPHILS # BLD AUTO: 3.95 K/UL (ref 2–7.15)
NEUTROPHILS NFR BLD: 66 % (ref 44–72)
NITRITE UR QL STRIP.AUTO: NEGATIVE
NRBC # BLD AUTO: 0 K/UL
NRBC BLD-RTO: 0 /100 WBC
PH UR STRIP.AUTO: 7 [PH] (ref 5–8)
PLATELET # BLD AUTO: 307 K/UL (ref 164–446)
PMV BLD AUTO: 8.5 FL (ref 9–12.9)
POTASSIUM SERPL-SCNC: 3.5 MMOL/L (ref 3.6–5.5)
PROT UR QL STRIP: NEGATIVE MG/DL
PROTHROMBIN TIME: 12.8 SEC (ref 12–14.6)
RBC # BLD AUTO: 4.68 M/UL (ref 4.2–5.4)
RBC UR QL AUTO: NEGATIVE
SODIUM SERPL-SCNC: 132 MMOL/L (ref 135–145)
SP GR UR STRIP.AUTO: 1.01
UROBILINOGEN UR STRIP.AUTO-MCNC: 0.2 MG/DL
WBC # BLD AUTO: 6 K/UL (ref 4.8–10.8)

## 2022-01-19 PROCEDURE — 36415 COLL VENOUS BLD VENIPUNCTURE: CPT

## 2022-01-19 PROCEDURE — 93005 ELECTROCARDIOGRAM TRACING: CPT

## 2022-01-19 PROCEDURE — 85610 PROTHROMBIN TIME: CPT

## 2022-01-19 PROCEDURE — 87086 URINE CULTURE/COLONY COUNT: CPT

## 2022-01-19 PROCEDURE — U0005 INFEC AGEN DETEC AMPLI PROBE: HCPCS

## 2022-01-19 PROCEDURE — C9803 HOPD COVID-19 SPEC COLLECT: HCPCS

## 2022-01-19 PROCEDURE — 80048 BASIC METABOLIC PNL TOTAL CA: CPT

## 2022-01-19 PROCEDURE — U0003 INFECTIOUS AGENT DETECTION BY NUCLEIC ACID (DNA OR RNA); SEVERE ACUTE RESPIRATORY SYNDROME CORONAVIRUS 2 (SARS-COV-2) (CORONAVIRUS DISEASE [COVID-19]), AMPLIFIED PROBE TECHNIQUE, MAKING USE OF HIGH THROUGHPUT TECHNOLOGIES AS DESCRIBED BY CMS-2020-01-R: HCPCS

## 2022-01-19 PROCEDURE — 81003 URINALYSIS AUTO W/O SCOPE: CPT

## 2022-01-19 PROCEDURE — 93010 ELECTROCARDIOGRAM REPORT: CPT | Performed by: INTERNAL MEDICINE

## 2022-01-19 PROCEDURE — 85025 COMPLETE CBC W/AUTO DIFF WBC: CPT

## 2022-01-19 ASSESSMENT — FIBROSIS 4 INDEX: FIB4 SCORE: 1.8

## 2022-01-19 NOTE — TELEPHONE ENCOUNTER
Telephone note signed by MD printed and faxed to [phone] undelivered status received.    Reviewed fax scanned to media 1/17/2022 and found 2nd fax number noted: [phone].    2nd attempt to fax clearance response to alternative fax, completed status received and fax confirmation sent to scanning for reference via [employer], completed status received.

## 2022-01-20 LAB
SARS-COV-2 RNA RESP QL NAA+PROBE: NOTDETECTED
SPECIMEN SOURCE: NORMAL

## 2022-01-21 LAB
BACTERIA UR CULT: NORMAL
SIGNIFICANT IND 70042: NORMAL
SITE SITE: NORMAL
SOURCE SOURCE: NORMAL

## 2022-01-25 ENCOUNTER — ANESTHESIA EVENT (OUTPATIENT)
Dept: SURGERY | Facility: MEDICAL CENTER | Age: 77
End: 2022-01-25
Payer: MEDICARE

## 2022-01-26 ENCOUNTER — APPOINTMENT (OUTPATIENT)
Dept: RADIOLOGY | Facility: MEDICAL CENTER | Age: 77
End: 2022-01-26
Attending: UROLOGY
Payer: MEDICARE

## 2022-01-26 ENCOUNTER — HOSPITAL ENCOUNTER (OUTPATIENT)
Facility: MEDICAL CENTER | Age: 77
End: 2022-01-26
Attending: UROLOGY | Admitting: UROLOGY
Payer: MEDICARE

## 2022-01-26 ENCOUNTER — ANESTHESIA (OUTPATIENT)
Dept: SURGERY | Facility: MEDICAL CENTER | Age: 77
End: 2022-01-26
Payer: MEDICARE

## 2022-01-26 VITALS
HEIGHT: 64 IN | DIASTOLIC BLOOD PRESSURE: 65 MMHG | TEMPERATURE: 97.6 F | HEART RATE: 69 BPM | WEIGHT: 149.25 LBS | SYSTOLIC BLOOD PRESSURE: 142 MMHG | BODY MASS INDEX: 25.48 KG/M2 | OXYGEN SATURATION: 93 % | RESPIRATION RATE: 18 BRPM

## 2022-01-26 DIAGNOSIS — R39.15 URGENCY OF URINATION: ICD-10-CM

## 2022-01-26 DIAGNOSIS — R35.0 FREQUENCY OF URINATION: Primary | ICD-10-CM

## 2022-01-26 PROCEDURE — 160039 HCHG SURGERY MINUTES - EA ADDL 1 MIN LEVEL 3: Performed by: UROLOGY

## 2022-01-26 PROCEDURE — 160035 HCHG PACU - 1ST 60 MINS PHASE I: Performed by: UROLOGY

## 2022-01-26 PROCEDURE — 502000 HCHG MISC OR IMPLANTS RC 0278: Performed by: UROLOGY

## 2022-01-26 PROCEDURE — 160028 HCHG SURGERY MINUTES - 1ST 30 MINS LEVEL 3: Performed by: UROLOGY

## 2022-01-26 PROCEDURE — 500002 HCHG ADHESIVE, DERMABOND: Performed by: UROLOGY

## 2022-01-26 PROCEDURE — C1778 LEAD, NEUROSTIMULATOR: HCPCS | Performed by: UROLOGY

## 2022-01-26 PROCEDURE — 700111 HCHG RX REV CODE 636 W/ 250 OVERRIDE (IP): Performed by: UROLOGY

## 2022-01-26 PROCEDURE — 700101 HCHG RX REV CODE 250: Performed by: UROLOGY

## 2022-01-26 PROCEDURE — A9270 NON-COVERED ITEM OR SERVICE: HCPCS | Performed by: ANESTHESIOLOGY

## 2022-01-26 PROCEDURE — 160046 HCHG PACU - 1ST 60 MINS PHASE II: Performed by: UROLOGY

## 2022-01-26 PROCEDURE — 160025 RECOVERY II MINUTES (STATS): Performed by: UROLOGY

## 2022-01-26 PROCEDURE — 160009 HCHG ANES TIME/MIN: Performed by: UROLOGY

## 2022-01-26 PROCEDURE — 700111 HCHG RX REV CODE 636 W/ 250 OVERRIDE (IP): Performed by: ANESTHESIOLOGY

## 2022-01-26 PROCEDURE — 700105 HCHG RX REV CODE 258: Performed by: UROLOGY

## 2022-01-26 PROCEDURE — 501838 HCHG SUTURE GENERAL: Performed by: UROLOGY

## 2022-01-26 PROCEDURE — 160048 HCHG OR STATISTICAL LEVEL 1-5: Performed by: UROLOGY

## 2022-01-26 PROCEDURE — 700101 HCHG RX REV CODE 250: Performed by: ANESTHESIOLOGY

## 2022-01-26 PROCEDURE — 700102 HCHG RX REV CODE 250 W/ 637 OVERRIDE(OP): Performed by: ANESTHESIOLOGY

## 2022-01-26 PROCEDURE — 160002 HCHG RECOVERY MINUTES (STAT): Performed by: UROLOGY

## 2022-01-26 DEVICE — IMPLANTABLE DEVICE: Type: IMPLANTABLE DEVICE | Site: BACK | Status: FUNCTIONAL

## 2022-01-26 RX ORDER — HALOPERIDOL 5 MG/ML
1 INJECTION INTRAMUSCULAR
Status: DISCONTINUED | OUTPATIENT
Start: 2022-01-26 | End: 2022-01-26 | Stop reason: HOSPADM

## 2022-01-26 RX ORDER — LIDOCAINE HYDROCHLORIDE 40 MG/ML
SOLUTION TOPICAL PRN
Status: DISCONTINUED | OUTPATIENT
Start: 2022-01-26 | End: 2022-01-26 | Stop reason: SURG

## 2022-01-26 RX ORDER — ROCURONIUM BROMIDE 10 MG/ML
INJECTION, SOLUTION INTRAVENOUS PRN
Status: DISCONTINUED | OUTPATIENT
Start: 2022-01-26 | End: 2022-01-26 | Stop reason: SURG

## 2022-01-26 RX ORDER — HYDRALAZINE HYDROCHLORIDE 20 MG/ML
5 INJECTION INTRAMUSCULAR; INTRAVENOUS
Status: DISCONTINUED | OUTPATIENT
Start: 2022-01-26 | End: 2022-01-26 | Stop reason: HOSPADM

## 2022-01-26 RX ORDER — HYDROMORPHONE HYDROCHLORIDE 1 MG/ML
0.2 INJECTION, SOLUTION INTRAMUSCULAR; INTRAVENOUS; SUBCUTANEOUS
Status: DISCONTINUED | OUTPATIENT
Start: 2022-01-26 | End: 2022-01-26 | Stop reason: HOSPADM

## 2022-01-26 RX ORDER — ACETAMINOPHEN 500 MG
1000 TABLET ORAL ONCE
Status: COMPLETED | OUTPATIENT
Start: 2022-01-26 | End: 2022-01-26

## 2022-01-26 RX ORDER — LABETALOL HYDROCHLORIDE 5 MG/ML
5 INJECTION, SOLUTION INTRAVENOUS
Status: DISCONTINUED | OUTPATIENT
Start: 2022-01-26 | End: 2022-01-26 | Stop reason: HOSPADM

## 2022-01-26 RX ORDER — VANCOMYCIN HYDROCHLORIDE 1 G/20ML
INJECTION, POWDER, LYOPHILIZED, FOR SOLUTION INTRAVENOUS
Status: COMPLETED | OUTPATIENT
Start: 2022-01-26 | End: 2022-01-26

## 2022-01-26 RX ORDER — DEXAMETHASONE SODIUM PHOSPHATE 4 MG/ML
INJECTION, SOLUTION INTRA-ARTICULAR; INTRALESIONAL; INTRAMUSCULAR; INTRAVENOUS; SOFT TISSUE PRN
Status: DISCONTINUED | OUTPATIENT
Start: 2022-01-26 | End: 2022-01-26 | Stop reason: SURG

## 2022-01-26 RX ORDER — ONDANSETRON 2 MG/ML
INJECTION INTRAMUSCULAR; INTRAVENOUS PRN
Status: DISCONTINUED | OUTPATIENT
Start: 2022-01-26 | End: 2022-01-26 | Stop reason: SURG

## 2022-01-26 RX ORDER — ONDANSETRON 2 MG/ML
4 INJECTION INTRAMUSCULAR; INTRAVENOUS
Status: DISCONTINUED | OUTPATIENT
Start: 2022-01-26 | End: 2022-01-26 | Stop reason: HOSPADM

## 2022-01-26 RX ORDER — METOCLOPRAMIDE HYDROCHLORIDE 5 MG/ML
INJECTION INTRAMUSCULAR; INTRAVENOUS PRN
Status: DISCONTINUED | OUTPATIENT
Start: 2022-01-26 | End: 2022-01-26 | Stop reason: SURG

## 2022-01-26 RX ORDER — PROPOFOL 10 MG/ML
INJECTION, EMULSION INTRAVENOUS PRN
Status: DISCONTINUED | OUTPATIENT
Start: 2022-01-26 | End: 2022-01-26 | Stop reason: SURG

## 2022-01-26 RX ORDER — OXYCODONE HCL 5 MG/5 ML
5 SOLUTION, ORAL ORAL
Status: DISCONTINUED | OUTPATIENT
Start: 2022-01-26 | End: 2022-01-26 | Stop reason: HOSPADM

## 2022-01-26 RX ORDER — SODIUM CHLORIDE, SODIUM LACTATE, POTASSIUM CHLORIDE, CALCIUM CHLORIDE 600; 310; 30; 20 MG/100ML; MG/100ML; MG/100ML; MG/100ML
INJECTION, SOLUTION INTRAVENOUS CONTINUOUS
Status: DISCONTINUED | OUTPATIENT
Start: 2022-01-26 | End: 2022-01-26 | Stop reason: HOSPADM

## 2022-01-26 RX ORDER — HYDROMORPHONE HYDROCHLORIDE 1 MG/ML
0.1 INJECTION, SOLUTION INTRAMUSCULAR; INTRAVENOUS; SUBCUTANEOUS
Status: DISCONTINUED | OUTPATIENT
Start: 2022-01-26 | End: 2022-01-26 | Stop reason: HOSPADM

## 2022-01-26 RX ORDER — BUPIVACAINE HYDROCHLORIDE AND EPINEPHRINE 5; 5 MG/ML; UG/ML
INJECTION, SOLUTION EPIDURAL; INTRACAUDAL; PERINEURAL
Status: DISCONTINUED | OUTPATIENT
Start: 2022-01-26 | End: 2022-01-26 | Stop reason: HOSPADM

## 2022-01-26 RX ORDER — SULFAMETHOXAZOLE AND TRIMETHOPRIM 800; 160 MG/1; MG/1
1 TABLET ORAL 2 TIMES DAILY
Qty: 10 TABLET | Refills: 0 | Status: SHIPPED | OUTPATIENT
Start: 2022-01-26 | End: 2022-01-31

## 2022-01-26 RX ORDER — OXYCODONE HYDROCHLORIDE 5 MG/1
5-10 TABLET ORAL EVERY 6 HOURS PRN
Qty: 15 TABLET | Refills: 0 | Status: SHIPPED | OUTPATIENT
Start: 2022-01-26 | End: 2022-01-31

## 2022-01-26 RX ORDER — LIDOCAINE HYDROCHLORIDE AND EPINEPHRINE 10; 10 MG/ML; UG/ML
INJECTION, SOLUTION INFILTRATION; PERINEURAL
Status: DISCONTINUED | OUTPATIENT
Start: 2022-01-26 | End: 2022-01-26 | Stop reason: HOSPADM

## 2022-01-26 RX ORDER — HYDROMORPHONE HYDROCHLORIDE 1 MG/ML
0.4 INJECTION, SOLUTION INTRAMUSCULAR; INTRAVENOUS; SUBCUTANEOUS
Status: DISCONTINUED | OUTPATIENT
Start: 2022-01-26 | End: 2022-01-26 | Stop reason: HOSPADM

## 2022-01-26 RX ORDER — DIPHENHYDRAMINE HYDROCHLORIDE 50 MG/ML
12.5 INJECTION INTRAMUSCULAR; INTRAVENOUS
Status: DISCONTINUED | OUTPATIENT
Start: 2022-01-26 | End: 2022-01-26 | Stop reason: HOSPADM

## 2022-01-26 RX ORDER — GENTAMICIN SULFATE 40 MG/ML
INJECTION, SOLUTION INTRAMUSCULAR; INTRAVENOUS
Status: DISCONTINUED | OUTPATIENT
Start: 2022-01-26 | End: 2022-01-26 | Stop reason: HOSPADM

## 2022-01-26 RX ORDER — OXYCODONE HCL 5 MG/5 ML
10 SOLUTION, ORAL ORAL
Status: DISCONTINUED | OUTPATIENT
Start: 2022-01-26 | End: 2022-01-26 | Stop reason: HOSPADM

## 2022-01-26 RX ORDER — DOCUSATE SODIUM 100 MG/1
100 CAPSULE, LIQUID FILLED ORAL 2 TIMES DAILY
Qty: 30 CAPSULE | Refills: 0 | COMMUNITY
End: 2022-06-13

## 2022-01-26 RX ORDER — POLYETHYLENE GLYCOL 3350 17 G/17G
17 POWDER, FOR SOLUTION ORAL DAILY
Qty: 14 EACH | Refills: 0 | COMMUNITY
End: 2022-10-10

## 2022-01-26 RX ORDER — SODIUM CHLORIDE, SODIUM LACTATE, POTASSIUM CHLORIDE, CALCIUM CHLORIDE 600; 310; 30; 20 MG/100ML; MG/100ML; MG/100ML; MG/100ML
INJECTION, SOLUTION INTRAVENOUS CONTINUOUS
Status: ACTIVE | OUTPATIENT
Start: 2022-01-26 | End: 2022-01-26

## 2022-01-26 RX ORDER — CEFAZOLIN SODIUM 1 G/3ML
INJECTION, POWDER, FOR SOLUTION INTRAMUSCULAR; INTRAVENOUS PRN
Status: DISCONTINUED | OUTPATIENT
Start: 2022-01-26 | End: 2022-01-26 | Stop reason: SURG

## 2022-01-26 RX ORDER — LIDOCAINE HYDROCHLORIDE 20 MG/ML
INJECTION, SOLUTION EPIDURAL; INFILTRATION; INTRACAUDAL; PERINEURAL PRN
Status: DISCONTINUED | OUTPATIENT
Start: 2022-01-26 | End: 2022-01-26 | Stop reason: SURG

## 2022-01-26 RX ADMIN — LIDOCAINE HYDROCHLORIDE 4 ML: 40 SOLUTION TOPICAL at 07:43

## 2022-01-26 RX ADMIN — FENTANYL CITRATE 100 MCG: 50 INJECTION, SOLUTION INTRAMUSCULAR; INTRAVENOUS at 07:43

## 2022-01-26 RX ADMIN — DEXAMETHASONE SODIUM PHOSPHATE 8 MG: 4 INJECTION, SOLUTION INTRA-ARTICULAR; INTRALESIONAL; INTRAMUSCULAR; INTRAVENOUS; SOFT TISSUE at 07:49

## 2022-01-26 RX ADMIN — ONDANSETRON 4 MG: 2 INJECTION INTRAMUSCULAR; INTRAVENOUS at 08:51

## 2022-01-26 RX ADMIN — ROCURONIUM BROMIDE 5 MG: 10 INJECTION, SOLUTION INTRAVENOUS at 07:43

## 2022-01-26 RX ADMIN — ACETAMINOPHEN 1000 MG: 500 TABLET ORAL at 06:52

## 2022-01-26 RX ADMIN — PROPOFOL 40 MG: 10 INJECTION, EMULSION INTRAVENOUS at 08:07

## 2022-01-26 RX ADMIN — PROPOFOL 120 MG: 10 INJECTION, EMULSION INTRAVENOUS at 07:43

## 2022-01-26 RX ADMIN — SODIUM CHLORIDE, POTASSIUM CHLORIDE, SODIUM LACTATE AND CALCIUM CHLORIDE: 600; 310; 30; 20 INJECTION, SOLUTION INTRAVENOUS at 06:42

## 2022-01-26 RX ADMIN — CEFAZOLIN 2 G: 330 INJECTION, POWDER, FOR SOLUTION INTRAMUSCULAR; INTRAVENOUS at 07:43

## 2022-01-26 RX ADMIN — Medication 140 MG: at 07:43

## 2022-01-26 RX ADMIN — LIDOCAINE HYDROCHLORIDE 70 MG: 20 INJECTION, SOLUTION EPIDURAL; INFILTRATION; INTRACAUDAL at 07:43

## 2022-01-26 RX ADMIN — METOCLOPRAMIDE 10 MG: 5 INJECTION, SOLUTION INTRAMUSCULAR; INTRAVENOUS at 07:49

## 2022-01-26 ASSESSMENT — PAIN DESCRIPTION - PAIN TYPE
TYPE: SURGICAL PAIN

## 2022-01-26 ASSESSMENT — FIBROSIS 4 INDEX: FIB4 SCORE: 1.09

## 2022-01-26 ASSESSMENT — PAIN SCALES - GENERAL: PAIN_LEVEL: 0

## 2022-01-26 NOTE — OP REPORT
[facility] Operative Report    Pre-operative Diagnosis: Severe urgency of urination  Severe overactive bladder  severe frequency of urination  urge incontinence of urination   Post-operative Diagnosis: Same as above   Procedure: 1. Sacral Nerve Stimulator (SNS) lead placement stage I (CPT 36905)  2. Fluroscpy, up to one hour ([id number])   Attending: Josh Hess M.D., MD   Anesthesia: Type: General  Anesthesiologist: Rosalinda Monaco M.D.   Estimated Blood Loss: minimal   IV fluids <1L crystalloid   Specimens: None   Drains: None   Complications: None   Wound class I clean   Condition: Stable, procedure well tolerated    Disposition:  PACU, DC home, evaluation of new SNS lead for next 1-2 weeks with implantation of generator if successful vs removal if no 50% improvement   Findings: 1. Good toe flexionin leads 1-3 with threshold currents of about 0.4-2.1 mA.  Anthony in leas 2 and 3 with 2.6mA but with excellent flouroscopic position.  2. programmed the InterStim device to program 3 with a current of 0.2 mA with adequate perineal stimulation     Indications for Procedure:  76 y.o. female with Severe urgency of urination, Severe overactive bladder, severe frequency of urination and urge incontinence of urination who presents today for SNS lead placement. Risks discussed but not limited to included risk of infection, inability to get certain MRI, bleeding, need for secondary procedures, need for removal, discomfort with stimulator on and risk of anesthesia in general. After a full discussion of the alternatives risks and benefits of the procedure, the patient consented to proceeding with the planned procedure.     Procedure in Detail:   The patient was explained the risks and benefits of the procedure and elected to proceed. The patient was explained the risks of bleeding, infection, pain, failure of procedure, extrusion of device.     In the prone position, the patient was prepped and draped in the usual  sterile fashion, given a g of intravenous Kefzol, and prepped and draped in the usual sterile fashion. The R medial sacral border was marked using AP fluoroscopy.  A cross table lateral view was then used to identify our entrance location about 9 cm above the tip of the coccyx and marked. A mixture of 1% lidocaine and 0.25% bupivacaine was used to anesthetize the presacral area. Using fluoroscopic guidance the S3 sacral neural foramen was intubated on the right side. On this side there was good flexion of the great toe at a threshold Current of 0.4, good contraction of the pelvic floor at a threshold current of 2.1    The inner stylette of the needle was removed. The long styloid was replaced and the needle was removed within the long stylettes in situ. Dilator was then placed in the long stylettes through the corresponding neural foramen and the inner sheath was removed leaving just an outer sheath of the dilator.  The quadripolar electrodes were then passed through the dilator, the dilator was removed and the leads were deployed under fluoroscopic guidance.  There was good motor response as detailed above in findings.     The lead was tunneled using the ioSafe tunneling device laterally out the upper outer quadrants of the ipsilateral (right) buttocks and brought out through a 2 cm incision.  This incision was well anesthetized with half percent Marcaine plain.  Through this incision a small subcutaneous pocket was made.  The tunneling device was then passed from this subcutaneous pocket to the contralateral buttock and brought through the skin.  The device was used to bring the protective boot and extension lead back through tothe incision out to our right buttock incision.  The lead was connected to the lead and the facet screw was securing this in place. An 0 silk suture was used to anchor the extension lead in the subcutaneous tissue. The lead and boot were placed into the pocket created after irrigating  this out with antibiotic solution. 0.5% marcaine was delivered to all subcutaneous incisions in generous fashion.  The upper outer quadrant pockets were then irrigated with Bvanc/Gent solution. The subQ tissue was closed with a vicryl suture. The skin was closed using running 4-0 Monocryl suture and the initial stab wounds for the needle placement were closed using 4-0 Monocryl suture. The extension cable exit site was dressed with drain sponge and two medium tegaderms.  All suture sites were covered with Dermabond.    Patient was awoken from anesthesia and brought to the PACU in stable condition.  After they were adequately awakened with the patient and family member we programmed the InterStim device to program 3 with a current of 0.2 mA.         Josh Hess MD  [address] [place] [address] [address]  ENMA Hayes [id number] [phone]

## 2022-01-26 NOTE — ANESTHESIA PROCEDURE NOTES
Airway    Date/Time: 1/26/2022 7:43 AM  Performed by: Rosalinda Monaco M.D.  Authorized by: Rosalinda Monaco M.D.     Location:  OR  Urgency:  Elective  Indications for Airway Management:  Anesthesia      Spontaneous Ventilation: absent    Sedation Level:  Deep  Preoxygenated: Yes    Patient Position:  Sniffing  Mask Difficulty Assessment:  0 - not attempted  Final Airway Type:  Endotracheal airway  Final Endotracheal Airway:  ETT  Cuffed: Yes    Technique Used for Successful ETT Placement:  Direct laryngoscopy  Devices/Methods Used in Placement:  Cricoid pressure    Insertion Site:  Oral  Blade Type:  Mat  Laryngoscope Blade/Videolaryngoscope Blade Size:  3  ETT Size (mm):  7.0  Measured from:  Teeth  ETT to Teeth (cm):  21  Placement Verified by: auscultation and capnometry    Cormack-Lehane Classification:  Grade IIa - partial view of glottis  Number of Attempts at Approach:  1

## 2022-01-26 NOTE — DISCHARGE INSTRUCTIONS
ACTIVITY: Rest and take it easy for the first 24 hours.  A responsible adult is recommended to remain with you during that time.  It is normal to feel sleepy.  We encourage you to not do anything that requires balance, judgment or coordination.    MILD FLU-LIKE SYMPTOMS ARE NORMAL. YOU MAY EXPERIENCE GENERALIZED MUSCLE ACHES, THROAT IRRITATION, HEADACHE AND/OR SOME NAUSEA.    FOR 24 HOURS DO NOT:  Drive, operate machinery or run household appliances.  Drink beer or alcoholic beverages.   Make important decisions or sign legal documents.    SPECIAL INSTRUCTIONS:  DR. SILVERIO' DISCHARGE INSTRUCTIONS FOLLOWING   SACRAL NERVE STIMULATOR (SNS) STAGE I  PLACEMENT     DIET:  You can resume your regular diet. We encourage you to eat well-balanced and nutritious meals.      ACTIVITY:  Please restrain from strenuous activity or heavy lifting (more than 10 pounds) for the next week. Please also avoid excessive bending or straining. Please walk daily as much as tolerated, making exercise a part of your daily life. Do not drive while using narcotics for pain control.     WOUND CARE:  When Showering leave dressing on and try to avoid excessive moisture in area of dressing. It is ok if it becomes a little wet, but pat dry afterward.  If dressing becomes dislodged, please use the extra dressing provided to try to keep this area sealed and water tight. Please leave on until your follow procedure.     MEDICATIONS:  1. Please use over the counter Tylenol or Ibuprofen for pain control as needed  2. Please take a 5 day course of Bactrim (antibiotic) as prescribed.  3. You may use oxycodone for pain refractory to over the counter meds for 3 days as needed. Please try to avoid using these, however, if not needed.  4. If you are using aspirin, Plavix, or coumadin, please don’t restart these medications for 1 days following your procedure.    FOLLOW-UP:  Please keep in close contact with the [employer] rep to let us know how you  are doing and record voiding diary as instructed. He will be in contact with me regarding scheduled generator placement vs removal of  if not improved.     We will call you to schedule your follow up appointment in 2 weeks post operatively with my PA Neelam or myself. If you have not heard from us in 1-2 business days, please call [phone] to schedule your follow-up appointment. You may also contact this number if you have questions or concerns that can be answered by Dr. Hess’ staff.      WARNING SIGNS:  Fever greater than 101 degrees Fahrenheit, chills, nausea or vomiting, severe or persistent bleeding from the wound, increasing pain, swelling, redness or drainage from the incision.  If you are experiencing these symptoms, call the Urology Clinic or go to your local PCP or emergency room.     MEDICAL HELP DURING NORMAL BUSINESS HOURS:  Between the hours of 8 AM and 5 PM, please call [phone] to speak with Dr. Josh Hess’ staff.     MEDICAL HELP AFTER HOURS:  If you have a serious emergency such as chest pain, shortness of breath, relentless pain you should call [phone]. For other urgent problems after hours you may contact the urology physician on call by phoning the [phone]. You may also visit the Emergency room at local hospital for help.     For non-emergent problems such as prescription refills or routine questions, please do your best to contact us during normal business hours. This after-hours number should be used for urgent or emergent questions only.       Josh Hess M.D.   [address] [place] [address]   [phone]       DIET: To avoid nausea, slowly advance diet as tolerated, avoiding spicy or greasy foods for the first day.  Add more substantial food to your diet according to your physician's instructions.  Babies can be fed formula or breast milk as soon as they are hungry.  INCREASE FLUIDS AND FIBER TO AVOID CONSTIPATION.    SURGICAL DRESSING/BATHING: See  above.    FOLLOW-UP APPOINTMENT:  A follow-up appointment should be arranged with your doctor in 2 weeks; call to schedule.    You should CALL YOUR PHYSICIAN if you develop:  Fever greater than 101 degrees F.  Pain not relieved by medication, or persistent nausea or vomiting.  Excessive bleeding (blood soaking through dressing) or unexpected drainage from the wound.  Extreme redness or swelling around the incision site, drainage of pus or foul smelling drainage.  Inability to urinate or empty your bladder within 8 hours.  Problems with breathing or chest pain.    You should call 911 if you develop problems with breathing or chest pain.  If you are unable to contact your doctor or surgical center, you should go to the nearest emergency room or urgent care center.  Physician's telephone #: Dr. Hess [phone]    If any questions arise, call your doctor.  If your doctor is not available, please feel free to call the Surgical Center at [phone].     A registered nurse may call you a few days after your surgery to see how you are doing after your procedure.    MEDICATIONS: Resume taking daily medication.  Take prescribed pain medication with food.  If no medication is prescribed, you may take non-aspirin pain medication if needed.  PAIN MEDICATION CAN BE VERY CONSTIPATING.  Take a stool softener or laxative such as senokot, pericolace, or milk of magnesia if needed.    Prescription given for Roxicodone (pain medication) and Bactrim (antibiotic).  Last pain medication given at ***.    If your physician has prescribed pain medication that includes Acetaminophen (Tylenol), do not take additional Acetaminophen (Tylenol) while taking the prescribed medication.    Depression / Suicide Risk    As you are discharged from this [facility] facility, it is important to learn how to keep safe from harming yourself.    Recognize the warning signs:  · Abrupt changes in personality, positive or negative- including increase in  energy   · Giving away possessions  · Change in eating patterns- significant weight changes-  positive or negative  · Change in sleeping patterns- unable to sleep or sleeping all the time   · Unwillingness or inability to communicate  · Depression  · Unusual sadness, discouragement and loneliness  · Talk of wanting to die  · Neglect of personal appearance   · Rebelliousness- reckless behavior  · Withdrawal from people/activities they love  · Confusion- inability to concentrate     If you or a loved one observes any of these behaviors or has concerns about self-harm, here's what you can do:  · Talk about it- your feelings and reasons for harming yourself  · Remove any means that you might use to hurt yourself (examples: pills, rope, extension cords, firearm)  · Get professional help from the community (Mental Health, Substance Abuse, psychological counseling)  · Do not be alone:Call your Safe Contact- someone whom you trust who will be there for you.  · Call your local CRISIS HOTLINE 992-1216 or 977-898-7378  · Call your local Children's Mobile Crisis Response Team [place] (734) 780-3775 or www.DianDian  · Call the toll free National Suicide Prevention Hotlines   · National Suicide Prevention Lifeline 360-079-MOTM (1963)  · National Hope Line Network 800-SUICIDE (175-1580)

## 2022-01-26 NOTE — H&P
"[facility] [place] Consult/H&P Note    Patient's Name/MRN: Tiffany Louis, [id number]   Room #: TPREPOOL/NONE    Admit Date:1/26/2022  Today's Date: 1/26/2022   Length of stay:  LOS: 0 days      Reason for consult/chief complaint: urge incontinence  ID/HPI: Tiffany Louis is a 76 y.o. female patient who p/w Severe urgency of urination, Severe overactive bladder, severe frequency of urination and urge incontinence of urination.  She presents today for Scaral nerve stimulator placement.     Past Medical History:   Past Medical History:   Diagnosis Date   • Anemia    • Anemia    • Anesthesia     \"very low blood pressure\" Low oxygen   • Aortic valve stenosis, severe 07/20/2020    Aortic valve replacement   • Arthritis     osteo, \"all over\"   • Bilateral carotid artery stenosis    • Breast cancer ([facility])     left   • Breath shortness     from aortic stenosis; much improved since aortic valve replacement   • Cancer ([facility])     skin   • Cancer ([facility]) 2015    breast, Left   • Cataract     Bilateral IOL   • Dental disorder     upper implants front teeth   • Depression    • Essential hypertension    • GERD (gastroesophageal reflux disease)    • Heart murmur    • History of left heart catheterization - normal 2020 prior to TAVR    • Joint replacement     bilateral knees and right hip   • Nasal drainage    • Obesity    • Osteoporosis    • Psychiatric problem     anxiety/depression   • Renal disorder 06/11/2020    kidney stone   • Restless leg syndrome    • Sleep apnea     CPAP   • Snoring    • Umbilical hernia 10/2016   • Unspecified urinary incontinence    • Urinary bladder disorder 6/21/2017    reports freq infections but none in the past year; on ABX, + bacteria in \"gut\"; last UTI 10/2021        Past Surgical History:   Past Surgical History:   Procedure Laterality Date   • TRANSCATHETER AORTIC VALVE REPLACEMENT  7/20/2020    Procedure: REPLACEMENT, AORTIC VALVE, TRANSCATHETER;  Surgeon: Mukesh Reid M.D.;  " Location: [facility];  Service: Cardiac   • NEHA  7/20/2020    Procedure: ECHOCARDIOGRAM, TRANSESOPHAGEAL-;  Surgeon: Mukesh Reid M.D.;  Location: [facility];  Service: Cardiac   • LUNG BIOPSY OPEN Right 06/2020   • LATISSIMUS FLAP Left 6/27/2017    Procedure: LATISSIMUS FLAP W/INSERTION OF IMPLANT;  Surgeon: Johnny Flannery M.D.;  Location: [facility];  Service:    • BREAST IMPLANT REMOVAL Left 6/27/2017    Procedure: BREAST IMPLANT REMOVAL;  Surgeon: Johnny Flannery M.D.;  Location: [facility];  Service:    • CAPSULECTOMY  4/13/2017    and debridement left breast   • BOWEL RESECTION  12/28/2016   • BREAST RECONSTRUCTION Bilateral 11/29/2016    Procedure: BREAST RECONSTRUCTION;  Surgeon: Johnny Flannery M.D.;  Location: [facility];  Service:    • LATISSIMUS FLAP Right 11/29/2016    Procedure: LATISSIMUS FLAP W/ IMPLANT;  Surgeon: Johnny Flannery M.D.;  Location: [facility];  Service:    • TISSUE EXPANDER PLACE/REMOVE Left 11/29/2016    Procedure: TISSUE EXPANDER REMOVE - REMOVAL AND INSERTION OF IMPLANT;  Surgeon: Johnny Flannery M.D.;  Location: [facility];  Service:    • CAPSULECTOMY Bilateral 11/29/2016    Procedure: CAPSULECTOMY;  Surgeon: Johnny Flannery M.D.;  Location: [facility];  Service:    • OTHER SURGICAL PROCEDURE Right 7/2016    debridement and breast expander placement   • BREAST IMPLANT REMOVAL Right 1/6/2016    Procedure: BREAST IMPLANT REMOVAL, placement of drain;  Surgeon: Jon Leblanc M.D.;  Location: [facility];  Service:    • OTHER SURGICAL PROCEDURE Right 1/2016     second debridement right breast   • CATH PLACEMENT Right 9/8/2015    Procedure: CATH PLACEMENT PORT;  Surgeon: Vargas Hyde M.D.;  Location: [facility];  Service:    • OTHER SURGICAL PROCEDURE  9/8/2015    Port placement for chemo   • MASTECTOMY MODIFIED RADICAL Left  "8/10/2015    Procedure: MASTECTOMY MODIFIED RADICAL;  Surgeon: Vargas Hyde M.D.;  Location: SURGERY [facility];  Service:    • MASTECTOMY Right 8/10/2015    Procedure: MASTECTOMY SIMPLE;  Surgeon: Vargas Hyde M.D.;  Location: SURGERY [facility];  Service:    • NODE BIOPSY SENTINEL Left 8/10/2015    Procedure: NODE BIOPSY SENTINEL;  Surgeon: Vargas Hyde M.D.;  Location: SURGERY [facility];  Service:    • BREAST RECONSTRUCTION Bilateral 8/10/2015    Procedure: BREAST RECONSTRUCTION VIA;  Surgeon: Johnny Flannery M.D.;  Location: SURGERY [facility];  Service:    • TISSUE EXPANDER PLACE/REMOVE Bilateral 8/10/2015    Procedure: TISSUE EXPANDER PLACE/REMOVE & POSSIBLE ALLODERM;  Surgeon: Johnny Flannery M.D.;  Location: SURGERY [facility];  Service:    • ROTATOR CUFF REPAIR Right 2009    right   • HIP ARTHROPLASTY TOTAL Right 2008    Hip Replacement, Total, right   • HAND SURGERY Right 2/12/07    joint Right Thumb   • ABDOMINOPLASTY  10/28/04   • GASTRIC BYPASS LAPAROSCOPIC  10/10/01   • KNEE ARTHROPLASTY TOTAL Bilateral 1/24/00    bilateral knees   • OTHER  9/99    tumor exc Right hip   • INGRID BY LAPAROSCOPY  9/97   • BLADDER SUSPENSION  12/93   • HYSTERECTOMY, TOTAL ABDOMINAL  5/93    rectal, bladder repair   • EAR MIDDLE EXPLORATION Left 4/90    Left   • BREAST BIOPSY Left 6/85   • TUBAL LIGATION  3/84   • TONSILLECTOMY  1952   • KNEE ARTHROSCOPY Left 1983, 12/97    Left   • KNEE ARTHROSCOPY Right 6/88, 12/91, 11/97    Right   • OTHER SURGICAL PROCEDURE  4/06, 10/06    bilateral \"arm lift\"   • OTHER SURGICAL PROCEDURE  5/97, 4/05/05    vaginal repair   • SLEEVE,ARLEEN VASO THIGH          Family History:   Family History   Problem Relation Age of Onset   • Kidney Disease Mother    • Heart Failure Father          Social History:   Social History     Tobacco Use   • Smoking status: Never Smoker   • Smokeless tobacco: Never Used   Vaping Use   • Vaping Use: Never used   Substance Use " Topics   • Alcohol use: Yes     Comment: reports 1 /week   • Drug use: No      Social History     Social History Narrative   • Not on file        Allergies: she Ampicillin, Clindamycin, Levaquin, Penicillins, Amlodipine, Demerol, and Lisinopril    Medications:   Medications Prior to Admission   Medication Sig Dispense Refill Last Dose   • Magnesium Hydroxide (DULCOLAX PO) Take 2 Tablets by mouth every day.   1/25/2022 at afternoon   • NON SPECIFIED Take 4 Tablets by mouth every day. D Ketty, OTC for bladder health    1/12/2022 at am   • COLLAGEN PO Take 1 Each by mouth every day. powder   1/12/2022 at am   • Cholecalciferol (VITAMIN D3 PO) Take 4,000 Units by mouth every day.   1/12/2022 at am   • NEUPRO 2 MG/24HR patch Place 1 Patch on the skin every day.   1/26/2022 at off 0400   • fluticasone (FLONASE ALLERGY RELIEF) 50 MCG/ACT nasal spray 1-2 spray 1-2 times a day (Patient taking differently: Administer 1-2 Sprays into affected nostril(S) 2 times a day as needed.) 16 g 11 1/25/2022 at 2000   • MYRBETRIQ 50 MG TABLET SR 24 HR Take 50 mg by mouth every evening.   1/25/2022 at 2000   • losartan-hydrochlorothiazide (HYZAAR) 100-12.5 MG per tablet Take 1 tablet by mouth every day. 90 tablet 3 1/25/2022 at am   • ferrous sulfate 325 (65 Fe) MG tablet Take 325 mg by mouth every day.   1/12/2022 at am   • doxylamine (UNISOM) 25 MG Tab tablet Take 25 mg by mouth every bedtime.   1/25/2022 at 2000   • Desvenlafaxine Succinate (PRISTIQ) 100 MG TABLET SR 24 HR Take 1 Tab by mouth every morning. Indications: Major Depressive Disorder   1/25/2022 at am   • folic acid (FOLATE) 400 MCG tablet Take 400 mcg by mouth every day.   1/12/2022 at am   • Coenzyme Q10 (COQ10 PO) Take 300 mg by mouth every day.   1/12/2022 at am   • Cyanocobalamin (VITAMIN B-12) 1000 MCG Tab Take 3,000 mcg by mouth every day.   1/12/2022 at am   • aspirin 81 MG tablet Take 81 mg by mouth every evening.   1/12/2022 at pm   • acetaminophen (TYLENOL) 500  "MG Tab Take 500-1,000 mg by mouth every 6 hours as needed.   1/24/2022 at Unknown time   • aripiprazole (ABILIFY) 5 MG tablet Take 5 mg by mouth every morning. Indications: Major Depressive Disorder   1/25/2022 at am   • alprazolam (XANAX) 0.5 MG TABS Take 0.5 mg by mouth at bedtime as needed for Anxiety. Indications: Feeling Anxious   1/25/2022 at 2000   • Probiotic Product (PROBIOTIC DAILY PO) Take 1 Cap by mouth every day.   1/12/2022 at am         Review of Systems  As per HPI       Physical Exam  VITAL SIGNS: /63 Comment: NO B/P, IV or Labs on left side  Pulse 73   Temp 36.2 °C (97.2 °F) (Temporal)   Resp 18   Ht 1.626 m (5' 4\")   Wt 67.7 kg (149 lb 4 oz)   SpO2 97%   BMI 25.62 kg/m²   GENERAL:  alert, in no acute distress  EYES: EOMI and normal accomodation  Neck: Supple  BACK: No CVA tenderness.   CHEST AND LUNGS: good air entry, no audible wheezes  CARDIOVASCULAR: Rate is regular, no peripheral edema.   ABDOMEN: Abdomen soft, nontender. No masses, organomegaly.  : deferrred  EXTREMITIES: Warm and well perfused without c/c/e  NEURO: No focal deficits  SKIN: Skin color, texture, turgor normal. No rashes, lesions, nor jaundice.      Labs:              Glucose:  Lab Results   Component Value Date/Time    GLUCOSE 152 (H) 01/19/2022 11:14 AM    GLUCOSE 114 (H) 07/21/2020 05:07 AM    GLUCOSE 106 (H) 07/20/2020 11:20 AM    GLUCOSE 96 07/07/2020 04:28 PM     Coags:  Lab Results   Component Value Date/Time    INR 0.99 01/19/2022 11:14 AM    INR 0.92 07/08/2020 02:07 PM    INR 0.98 06/16/2020 09:45 AM         Urinalysis:   Lab Results   Component Value Date/Time    COLORURINE Yellow 01/19/2022 11:14 AM    CLARITY Clear 01/19/2022 11:14 AM    SPECGRAVITY 1.010 01/19/2022 11:14 AM    PHURINE 7.0 01/19/2022 11:14 AM    GLUCOSEUR Negative 01/19/2022 11:14 AM    KETONES Negative 01/19/2022 11:14 AM    NITRITE Negative 01/19/2022 11:14 AM    OCCULTBLOOD Negative 01/19/2022 11:14 AM    RBCURINE Rare 03/17/2008 " 03:05 PM    BACTERIA Rare (A) 03/17/2008 03:05 PM    EPITHELCELL Rare 03/17/2008 03:05 PM         Assessment/Recommendation   76 y.o. femalewith urge, urge incontinence, bladder overactivity and mild PAMELA in addition to this.  After a full discussion of the alternatives risks and benefits of the procedure, the patient consented to proceeding with the planned procedure.  Risk discussed included failure to have improvement of symptoms, need for explantation, possible future difficulties for MRI, infection with possible need for explantation, neuropathy, perineal pain or sensation, bleeding.  Benefits previously discussed include anticholinergics, beta 3 agonists, and Botox injections, and posterior tibial nerve stimulation. She is aware that SNS device will not correct her stress leakage of urine      · Consent obatined  · Will proceed with Sacral nerve stimulation device       Josh Hess M.D.   [address] [place] [address]   [phone]

## 2022-01-26 NOTE — OR NURSING
Patient recovered well in post-op. AAOx4. VSS, on RA. Surgical sites CDI to sacral area. No surgical pain per patient. Patient able to intake fluids without nausea. Unable to contact spouse, voicemail left with update with patient permission. Glasses in place, no other belongings in PACU. Report called to AMRIK Hanson. Patient transported to phase II.

## 2022-01-26 NOTE — ANESTHESIA TIME REPORT
Anesthesia Start and Stop Event Times     Date Time Event    1/26/2022 0724 Ready for Procedure     0737 Anesthesia Start     0903 Anesthesia Stop        Responsible Staff  01/26/22    Name Role Begin End    Rosalinda Monaco M.D. Anesth 0737 0903        Preop Diagnosis (Free Text):  Pre-op Diagnosis     URGENT DESIRE TO URINATE        Preop Diagnosis (Codes):    Premium Reason  Non-Premium    Comments:

## 2022-01-26 NOTE — OR NURSING
Received from pacu.  Vss. Alert and oriented times three.  Dressing dry and intact. With break thru drainage.  Given dressing supplies to go home with.  Has medtronic supplies.  Verbalized understanding of dc instructions and home with .

## 2022-01-26 NOTE — ANESTHESIA PREPROCEDURE EVALUATION
Case: [id number] Date/Time: 01/26/22 0715    Procedure: INSERTION, ELECTRODE LEADS AND PULSE GENERATOR, NEUROSTIMULATOR, SACRAL - STAGE 1 LEAD PLACEMENT (N/A )    Pre-op diagnosis: URGENT DESIRE TO URINATE    Location: [facility] OR 17 / SURGERY [facility]    Surgeons: Josh Hess M.D.          Relevant Problems   ANESTHESIA   (positive) [place] (obstructive sleep apnea)      NEURO   (positive) Personal history of malignant neoplasm of breast      CARDIAC   (positive) Essential hypertension       Physical Exam    Airway   Mallampati: II  TM distance: >3 FB  Neck ROM: full       Cardiovascular - normal exam  Rhythm: regular  Rate: normal  (+) murmur     Dental - normal exam           Pulmonary - normal exam  Breath sounds clear to auscultation     Abdominal    Neurological - normal exam               Pt had TAVR 2020. Followed by cards. Echo good.  Anesthesia Plan    ASA 2       Plan - general       Airway plan will be ETT          Induction: intravenous    Postoperative Plan: Postoperative administration of opioids is intended.    Pertinent diagnostic labs and testing reviewed    Informed Consent:    Anesthetic plan and risks discussed with patient.    Use of blood products discussed with: patient whom consented to blood products.

## 2022-01-27 NOTE — ANESTHESIA POSTPROCEDURE EVALUATION
Patient: Tiffany Louis    Procedure Summary     Date: 01/26/22 Room / Location: [place] [address] / SURGERY [facility]    Anesthesia Start: 0737 Anesthesia Stop: 0903    Procedure: INSERTION, ELECTRODE LEADS AND PULSE GENERATOR, NEUROSTIMULATOR, SACRAL - STAGE 1 LEAD PLACEMENT (N/A Back) Diagnosis: (URGENT DESIRE TO URINATE)    Surgeons: Josh Hess M.D. Responsible Provider: Rosalinda Monaco M.D.    Anesthesia Type: general ASA Status: 2          Final Anesthesia Type: general  Last vitals  BP   Blood Pressure : 142/65, NIBP: 137/58    Temp   36.4 °C (97.6 °F)    Pulse   69   Resp   18    SpO2   93 %      Anesthesia Post Evaluation    Patient location during evaluation: PACU  Patient participation: complete - patient participated  Level of consciousness: awake and alert  Pain score: 0    Airway patency: patent  Anesthetic complications: no  Cardiovascular status: hemodynamically stable  Respiratory status: acceptable  Hydration status: euvolemic    PONV: none          No complications documented.     Nurse Pain Score: 0 (NPRS)

## 2022-02-07 NOTE — OR NURSING
Covid 19 screening - negative screen. DOS covid test to be collected.   HPI:   F/u visit for routine evaluation of HTN, T2DM, hyperlipidemia  A1C/chol 6.8/11 (januvia dose reduced); recent A1C 7.4  No acute issues  Currently w/o chest pain/abd. discomfort; no dyspnea, cough or increased pedal edema; denies constitutional complaints of fever, night sweats or wt loss; no evidence of GI/ hemorrhage    ROS is otherwise negative.     Past Medical History:   Diagnosis Date    Diabetes ([address]     Diverticulosis     Hyperlipidemia     Hypertension     IHSS (idiopathic hypertrophic subaortic stenosis) (HCC)        Past Surgical History:   Procedure Laterality Date    HX COLONOSCOPY  2011    neg; h/o polyps    HX HEART CATHETERIZATION  2003    HX ORTHOPAEDIC Left 2002    fx left wrist    HX TONSILLECTOMY      [place] ABDOMEN SURGERY PROC UNLISTED      RIH repair 2015 Dr Jitendra Heller History     Socioeconomic History    Marital status: SINGLE     Spouse name: Not on file    Number of children: Not on file    Years of education: Not on file    Highest education level: Not on file   Occupational History    Not on file   Social Needs    Financial resource strain: Not on file    Food insecurity     Worry: Not on file     Inability: Not on file    Transportation needs     Medical: Not on file     Non-medical: Not on file   Tobacco Use    Smoking status: Never Smoker    Smokeless tobacco: Never Used   Substance and Sexual Activity    Alcohol use: Yes     Comment: social     Drug use: No    Sexual activity: Not on file   Lifestyle    Physical activity     Days per week: Not on file     Minutes per session: Not on file    Stress: Not on file   Relationships    Social connections     Talks on phone: Not on file     Gets together: Not on file     Attends Mosque service: Not on file     Active member of club or organization: Not on file     Attends meetings of clubs or organizations: Not on file     Relationship status: Not on file    Intimate partner violence     Fear of current or ex partner: Not on file     Emotionally abused: Not on file     Physically abused: Not on file     Forced sexual activity: Not on file   Other Topics Concern    Not on file   Social History Narrative    Not on file       Allergies   Allergen Reactions    Losartan Other (comments)     hyperkalemia       Family History   Problem Relation Age of Onset    No Known Problems Mother     Heart Disease Father        Current Outpatient Medications   Medication Sig Dispense Refill    metFORMIN (GLUCOPHAGE) 1,000 mg tablet TAKE 1 TABLET TWICE DAILY WITH FOOD 180 Tab 1    metoprolol succinate (TOPROL-XL) 50 mg XL tablet TAKE 1 AND 1/2 TABLETS EVERY  Tab 1    SITagliptin (Januvia) 100 mg tablet Take 1 Tab by mouth daily. (Patient taking differently: Take 50 mg by mouth daily.) 90 Tab 1    hydroCHLOROthiazide (HYDRODIURIL) 25 mg tablet Take 1 Tab by mouth every morning. 90 Tab 3    atorvastatin (LIPITOR) 80 mg tablet Take 1 Tab by mouth daily. 90 Tab 3    aspirin delayed-release 81 mg tablet Take  by mouth daily.  multivitamin (ONE A DAY) tablet Take 1 tablet by mouth daily. Visit Vitals  /80 (BP 1 Location: Left upper arm, BP Patient Position: Sitting, BP Cuff Size: Adult)   Pulse 84   Temp 97.5 °F (36.4 °C) (Temporal)   Resp 16   Ht 6' 1\" (1.854 m)   Wt 195 lb (88.5 kg)   SpO2 98%   BMI 25.73 kg/m²       PE  WDWN in NAD  HEENT:  OP: clear. Neck: supple w/o mass or bruits. Chest: clear. CV: RRR with 2/6 VANDANA at URSB; pulses intact. Abd: soft, NT, w/o HSM or mass. Ext: tr edema. Neuro: NF. Assessment and Plan    Encounter Diagnoses   Name Primary?     Type 2 diabetes mellitus without complication, without long-term current use of insulin (HCC) Yes    Essential hypertension, benign     Mixed hyperlipidemia        HTN - controlled  T2DM/hyperlipidemia - continue dietary/activity efforts  No change in rx  OV 4 mos or prn  I have explained plan to patient and the patient verbalizes understanding

## 2022-02-08 ENCOUNTER — ANESTHESIA (OUTPATIENT)
Dept: SURGERY | Facility: MEDICAL CENTER | Age: 77
End: 2022-02-08
Payer: MEDICARE

## 2022-02-08 ENCOUNTER — ANESTHESIA EVENT (OUTPATIENT)
Dept: SURGERY | Facility: MEDICAL CENTER | Age: 77
End: 2022-02-08
Payer: MEDICARE

## 2022-02-08 ENCOUNTER — HOSPITAL ENCOUNTER (OUTPATIENT)
Facility: MEDICAL CENTER | Age: 77
End: 2022-02-08
Attending: UROLOGY | Admitting: UROLOGY
Payer: MEDICARE

## 2022-02-08 VITALS
TEMPERATURE: 97.2 F | SYSTOLIC BLOOD PRESSURE: 140 MMHG | HEIGHT: 64 IN | DIASTOLIC BLOOD PRESSURE: 74 MMHG | RESPIRATION RATE: 16 BRPM | HEART RATE: 79 BPM | OXYGEN SATURATION: 96 % | BODY MASS INDEX: 25.63 KG/M2 | WEIGHT: 150.13 LBS

## 2022-02-08 DIAGNOSIS — R39.15 URGENCY OF URINATION: ICD-10-CM

## 2022-02-08 DIAGNOSIS — R35.0 FREQUENCY OF URINATION: Primary | ICD-10-CM

## 2022-02-08 LAB
EXTERNAL QUALITY CONTROL: NORMAL
SARS-COV+SARS-COV-2 AG RESP QL IA.RAPID: NEGATIVE

## 2022-02-08 PROCEDURE — 160046 HCHG PACU - 1ST 60 MINS PHASE II: Performed by: UROLOGY

## 2022-02-08 PROCEDURE — 160009 HCHG ANES TIME/MIN: Performed by: UROLOGY

## 2022-02-08 PROCEDURE — 500002 HCHG ADHESIVE, DERMABOND: Performed by: UROLOGY

## 2022-02-08 PROCEDURE — 160035 HCHG PACU - 1ST 60 MINS PHASE I: Performed by: UROLOGY

## 2022-02-08 PROCEDURE — 501838 HCHG SUTURE GENERAL: Performed by: UROLOGY

## 2022-02-08 PROCEDURE — 160048 HCHG OR STATISTICAL LEVEL 1-5: Performed by: UROLOGY

## 2022-02-08 PROCEDURE — 502000 HCHG MISC OR IMPLANTS RC 0278: Performed by: UROLOGY

## 2022-02-08 PROCEDURE — 700105 HCHG RX REV CODE 258: Performed by: UROLOGY

## 2022-02-08 PROCEDURE — 700111 HCHG RX REV CODE 636 W/ 250 OVERRIDE (IP): Performed by: ANESTHESIOLOGY

## 2022-02-08 PROCEDURE — 160041 HCHG SURGERY MINUTES - EA ADDL 1 MIN LEVEL 4: Performed by: UROLOGY

## 2022-02-08 PROCEDURE — 700101 HCHG RX REV CODE 250: Performed by: UROLOGY

## 2022-02-08 PROCEDURE — 700102 HCHG RX REV CODE 250 W/ 637 OVERRIDE(OP): Performed by: ANESTHESIOLOGY

## 2022-02-08 PROCEDURE — C1767 GENERATOR, NEURO NON-RECHARG: HCPCS | Performed by: UROLOGY

## 2022-02-08 PROCEDURE — 700101 HCHG RX REV CODE 250: Performed by: ANESTHESIOLOGY

## 2022-02-08 PROCEDURE — 87426 SARSCOV CORONAVIRUS AG IA: CPT | Performed by: UROLOGY

## 2022-02-08 PROCEDURE — 160025 RECOVERY II MINUTES (STATS): Performed by: UROLOGY

## 2022-02-08 PROCEDURE — 160002 HCHG RECOVERY MINUTES (STAT): Performed by: UROLOGY

## 2022-02-08 PROCEDURE — 502240 HCHG MISC OR SUPPLY RC 0272: Performed by: UROLOGY

## 2022-02-08 PROCEDURE — 160029 HCHG SURGERY MINUTES - 1ST 30 MINS LEVEL 4: Performed by: UROLOGY

## 2022-02-08 PROCEDURE — A9270 NON-COVERED ITEM OR SERVICE: HCPCS | Performed by: ANESTHESIOLOGY

## 2022-02-08 DEVICE — IMPLANTABLE DEVICE: Type: IMPLANTABLE DEVICE | Site: BACK | Status: FUNCTIONAL

## 2022-02-08 RX ORDER — MIDAZOLAM HYDROCHLORIDE 1 MG/ML
INJECTION INTRAMUSCULAR; INTRAVENOUS PRN
Status: DISCONTINUED | OUTPATIENT
Start: 2022-02-08 | End: 2022-02-08 | Stop reason: SURG

## 2022-02-08 RX ORDER — ONDANSETRON 2 MG/ML
INJECTION INTRAMUSCULAR; INTRAVENOUS PRN
Status: DISCONTINUED | OUTPATIENT
Start: 2022-02-08 | End: 2022-02-08 | Stop reason: SURG

## 2022-02-08 RX ORDER — HYDRALAZINE HYDROCHLORIDE 20 MG/ML
5 INJECTION INTRAMUSCULAR; INTRAVENOUS
Status: DISCONTINUED | OUTPATIENT
Start: 2022-02-08 | End: 2022-02-08 | Stop reason: HOSPADM

## 2022-02-08 RX ORDER — SULFAMETHOXAZOLE AND TRIMETHOPRIM 800; 160 MG/1; MG/1
1 TABLET ORAL 2 TIMES DAILY
Qty: 6 TABLET | Refills: 0 | Status: SHIPPED | OUTPATIENT
Start: 2022-02-08 | End: 2022-02-11

## 2022-02-08 RX ORDER — CEFAZOLIN SODIUM 1 G/3ML
INJECTION, POWDER, FOR SOLUTION INTRAMUSCULAR; INTRAVENOUS PRN
Status: DISCONTINUED | OUTPATIENT
Start: 2022-02-08 | End: 2022-02-08 | Stop reason: SURG

## 2022-02-08 RX ORDER — DIPHENHYDRAMINE HYDROCHLORIDE 50 MG/ML
12.5 INJECTION INTRAMUSCULAR; INTRAVENOUS
Status: DISCONTINUED | OUTPATIENT
Start: 2022-02-08 | End: 2022-02-08 | Stop reason: HOSPADM

## 2022-02-08 RX ORDER — OXYCODONE HCL 5 MG/5 ML
5 SOLUTION, ORAL ORAL
Status: DISCONTINUED | OUTPATIENT
Start: 2022-02-08 | End: 2022-02-08 | Stop reason: HOSPADM

## 2022-02-08 RX ORDER — POLYETHYLENE GLYCOL 3350 17 G/17G
17 POWDER, FOR SOLUTION ORAL DAILY
Qty: 14 EACH | Refills: 0 | COMMUNITY
End: 2022-06-13

## 2022-02-08 RX ORDER — OXYCODONE HYDROCHLORIDE 5 MG/1
5-10 TABLET ORAL EVERY 6 HOURS PRN
Qty: 15 TABLET | Refills: 0 | Status: SHIPPED | OUTPATIENT
Start: 2022-02-08 | End: 2022-02-13

## 2022-02-08 RX ORDER — SODIUM CHLORIDE, SODIUM LACTATE, POTASSIUM CHLORIDE, CALCIUM CHLORIDE 600; 310; 30; 20 MG/100ML; MG/100ML; MG/100ML; MG/100ML
INJECTION, SOLUTION INTRAVENOUS CONTINUOUS
Status: DISCONTINUED | OUTPATIENT
Start: 2022-02-08 | End: 2022-02-08 | Stop reason: HOSPADM

## 2022-02-08 RX ORDER — LIDOCAINE HYDROCHLORIDE 20 MG/ML
INJECTION, SOLUTION EPIDURAL; INFILTRATION; INTRACAUDAL; PERINEURAL PRN
Status: DISCONTINUED | OUTPATIENT
Start: 2022-02-08 | End: 2022-02-08 | Stop reason: SURG

## 2022-02-08 RX ORDER — ONDANSETRON 2 MG/ML
4 INJECTION INTRAMUSCULAR; INTRAVENOUS
Status: DISCONTINUED | OUTPATIENT
Start: 2022-02-08 | End: 2022-02-08 | Stop reason: HOSPADM

## 2022-02-08 RX ORDER — METOPROLOL TARTRATE 1 MG/ML
1 INJECTION, SOLUTION INTRAVENOUS
Status: DISCONTINUED | OUTPATIENT
Start: 2022-02-08 | End: 2022-02-08 | Stop reason: HOSPADM

## 2022-02-08 RX ORDER — OXYCODONE HCL 5 MG/5 ML
10 SOLUTION, ORAL ORAL
Status: DISCONTINUED | OUTPATIENT
Start: 2022-02-08 | End: 2022-02-08 | Stop reason: HOSPADM

## 2022-02-08 RX ORDER — SUCCINYLCHOLINE CHLORIDE 20 MG/ML
INJECTION INTRAMUSCULAR; INTRAVENOUS PRN
Status: DISCONTINUED | OUTPATIENT
Start: 2022-02-08 | End: 2022-02-08 | Stop reason: SURG

## 2022-02-08 RX ORDER — LABETALOL HYDROCHLORIDE 5 MG/ML
5 INJECTION, SOLUTION INTRAVENOUS
Status: DISCONTINUED | OUTPATIENT
Start: 2022-02-08 | End: 2022-02-08 | Stop reason: HOSPADM

## 2022-02-08 RX ORDER — HALOPERIDOL 5 MG/ML
1 INJECTION INTRAMUSCULAR
Status: DISCONTINUED | OUTPATIENT
Start: 2022-02-08 | End: 2022-02-08 | Stop reason: HOSPADM

## 2022-02-08 RX ORDER — MIDAZOLAM HYDROCHLORIDE 1 MG/ML
1 INJECTION INTRAMUSCULAR; INTRAVENOUS
Status: DISCONTINUED | OUTPATIENT
Start: 2022-02-08 | End: 2022-02-08 | Stop reason: HOSPADM

## 2022-02-08 RX ORDER — ACETAMINOPHEN 325 MG/1
650 TABLET ORAL ONCE
Status: COMPLETED | OUTPATIENT
Start: 2022-02-08 | End: 2022-02-08

## 2022-02-08 RX ORDER — BUPIVACAINE HYDROCHLORIDE AND EPINEPHRINE 5; 5 MG/ML; UG/ML
INJECTION, SOLUTION PERINEURAL
Status: DISCONTINUED | OUTPATIENT
Start: 2022-02-08 | End: 2022-02-08 | Stop reason: HOSPADM

## 2022-02-08 RX ADMIN — CEFAZOLIN 2 G: 330 INJECTION, POWDER, FOR SOLUTION INTRAMUSCULAR; INTRAVENOUS at 07:44

## 2022-02-08 RX ADMIN — PROPOFOL 200 MG: 10 INJECTION, EMULSION INTRAVENOUS at 07:44

## 2022-02-08 RX ADMIN — SODIUM CHLORIDE, POTASSIUM CHLORIDE, SODIUM LACTATE AND CALCIUM CHLORIDE: 600; 310; 30; 20 INJECTION, SOLUTION INTRAVENOUS at 07:44

## 2022-02-08 RX ADMIN — MIDAZOLAM HYDROCHLORIDE 2 MG: 1 INJECTION, SOLUTION INTRAMUSCULAR; INTRAVENOUS at 07:44

## 2022-02-08 RX ADMIN — ONDANSETRON 4 MG: 2 INJECTION INTRAMUSCULAR; INTRAVENOUS at 07:44

## 2022-02-08 RX ADMIN — EPHEDRINE SULFATE 25 MG: 50 INJECTION INTRAMUSCULAR; INTRAVENOUS; SUBCUTANEOUS at 07:44

## 2022-02-08 RX ADMIN — LIDOCAINE HYDROCHLORIDE 50 MG: 20 INJECTION, SOLUTION EPIDURAL; INFILTRATION; INTRACAUDAL; PERINEURAL at 07:44

## 2022-02-08 RX ADMIN — FENTANYL CITRATE 100 MCG: 50 INJECTION, SOLUTION INTRAMUSCULAR; INTRAVENOUS at 07:44

## 2022-02-08 RX ADMIN — ACETAMINOPHEN 650 MG: 325 TABLET, FILM COATED ORAL at 09:16

## 2022-02-08 RX ADMIN — SUCCINYLCHOLINE CHLORIDE 100 MG: 20 INJECTION, SOLUTION INTRAMUSCULAR; INTRAVENOUS at 07:44

## 2022-02-08 ASSESSMENT — PAIN DESCRIPTION - PAIN TYPE
TYPE: CHRONIC PAIN
TYPE: SURGICAL PAIN

## 2022-02-08 ASSESSMENT — FIBROSIS 4 INDEX: FIB4 SCORE: 1.09

## 2022-02-08 ASSESSMENT — PAIN SCALES - GENERAL: PAIN_LEVEL: 2

## 2022-02-08 NOTE — OP REPORT
[facility] Operative Report    Pre-operative Diagnosis: Severe urgency of urination  Severe overactive bladder  severe frequency of urination  urge incontinence of urination   Post-operative Diagnosis: Same as above   Procedure: 1. sacral nerve stimulator (SNS) generator implant, stage II (CPT 90122)  2. Analysis and programming of implanted SNS generator (CPT 04645)   Attending: Josh Hess M.D., MD   Anesthesia: Anesthesiologist: Fred Geronimo M.D.   Estimated Blood Loss: minimal   IV fluids <1L crystalloid   Specimens: None   Drains: None   Complications: None   Wound class I clean   Condition: Stable, procedure well tolerated    Disposition:  PACU, home after recovery, f/u 2 weeks with me.    Findings: 1. successful generator implant, no signs of infection. Generator programmed successfully in PACU.     Indications for Procedure:  Tiffany Louis is a 76 y.o. female with h/o Severe urgency of urination, Severe overactive bladder, severe frequency of urination and urge incontinence of urination refractory to pharmacotherapy who after  implant had significant improvements in his urinary symptoms including increased time between voids, decrease frequency in voids with very quick improvements to his quality of life for his severe urgency and frequency. Risks discussed but not limited to included risk of infection, bleeding, need for secondary procedures, need for removal, discomfort with stimulator on and risk of anesthesia in general. After a full discussion of the alternatives risks and benefits of the procedure, the patient consented to proceeding with the planned procedure.     Procedure in Detail:  After induction of general anesthesia the placement was carefully placed in the prone position using pillows and pads with all pressure points appropriately padded.  The patient's lower back and buttocks was prepped from the mid thorax down to the thighs with ChloraPrep.  The patient received  Ancef for antibiotic prophylaxis and had SCDs placed for DVT prophylaxis.    The exit site of the extension cable was prepped out of the field but accessible to the circulator.  Local anesthetic was administered to the site of the old incision where the lead was previously placed.  This incision was extended to 4 cm and the protective boot and lead were removed out of the incision.  The screw was loosened and lead removed from protective boot.  The extension cord was cut and the boot disposed of. The extension cable was removed through the other incision under the drapes by the circulating nurse.  The lead was then inserted into the InterStim generator with all 4 blue leads clearly visible within the windows.  This was secured with the  screwdriver.  A combination of blunt and electrocautery was used to create a pocket 3 cm inferior centimeter lateral on each side and a small superior pocket so the generator could drop easily within pocket.  This pocket was irrigated out with Vanc/Gent in water solution and sterile water copiously.  The generator was placed within an antibiotic TYRX pouch and placed within the pocket. Impedences were verified prior to closing. a 3-0 Vicryl was then used to close the subcutaneous tissue, and a 4-0 Monocryl was used to close the skin running baseball stitch. The incision was cleaned and dried and Dermabond was applied over the wound.  More local again was given along the skin incision.  The case was terminated, the patient awoken from anesthesia, and the patient brought to PACU in stable condition.    Once adequately alert in the recovery room the sacral nerve stimulator implanted generator was then programmed with program 3 at a current of 0.6 mA       Josh Hess MD  [address] [place] [address] [address]  [place] [id number] [phone]

## 2022-02-08 NOTE — ANESTHESIA PROCEDURE NOTES
Airway    Date/Time: 2/8/2022 7:44 AM  Performed by: Fred Geronimo M.D.  Authorized by: Fred Geronimo M.D.     Location:  OR  Urgency:  Elective  Indications for Airway Management:  Anesthesia      Spontaneous Ventilation: absent    Sedation Level:  Deep  Preoxygenated: Yes    Patient Position:  Sniffing  Final Airway Type:  Endotracheal airway  Final Endotracheal Airway:  ETT  Cuffed: Yes    Technique Used for Successful ETT Placement:  Direct laryngoscopy    Insertion Site:  Oral  Blade Type:  Mat  Laryngoscope Blade/Videolaryngoscope Blade Size:  3  ETT Size (mm):  6.5  Measured from:  Teeth  ETT to Teeth (cm):  20  Placement Verified by: auscultation and capnometry    Cormack-Lehane Classification:  Grade I - full view of glottis  Number of Attempts at Approach:  1

## 2022-02-08 NOTE — ANESTHESIA PREPROCEDURE EVALUATION
Case: [id number] Date/Time: 02/08/22 0715    Procedure: INSERTION, NEUROSTIMULATOR, BLADDER - OR LEAD REMOVAL IF NOT WORKING    Pre-op diagnosis: URGENT DESIRE TO URINATE    Location:  OR 03 / SURGERY [facility]    Surgeons: Josh Hess M.D.          Relevant Problems   ANESTHESIA   (positive) [place] (obstructive sleep apnea)      NEURO   (positive) Personal history of malignant neoplasm of breast      CARDIAC   (positive) Essential hypertension       Physical Exam    Airway   Mallampati: II  TM distance: >3 FB  Neck ROM: full       Cardiovascular - normal exam  Rhythm: regular  Rate: normal  (-) murmur     Dental - normal exam           Pulmonary - normal exam  Breath sounds clear to auscultation     Abdominal    Neurological - normal exam                 Anesthesia Plan    ASA 2       Plan - general       Airway plan will be ETT          Induction: intravenous    Postoperative Plan: Postoperative administration of opioids is intended.    Pertinent diagnostic labs and testing reviewed    Informed Consent:    Anesthetic plan and risks discussed with patient.    Use of blood products discussed with: patient whom consented to blood products.

## 2022-02-08 NOTE — OR NURSING
0827 To PACU from OR via gurney s/p insertion of a sacral nerve stimulator. Pt sleeping, respirations spontaneous and non-labored via OPA. Dr Geronimo of anesthesia noted that redness to pt's eyelids occurred when he removed the tape holding them shut.     0828 Pt rouses spontaneously, OPA removed.     0842 Pt has no complaints. [facility] representative at the bedside to program stimulator. Sealed surgical site to right flank observed.     0857 Pt reports pain to her right shoulder, requests Tylenol. Anesthesia notified, awaiting order.     0912 Tylenol administered. Pt's  updated.     0927 No changes. Report to discharge AMRIK Frederick.

## 2022-02-08 NOTE — OR NURSING
1006: D/Kirit to care of family post uneventful stay in PACU 2.   OBSTETRIC HISTORY AND PHYSICAL EXAM    PRIMARY CERTIFIED NURSE MIDWIFE:  Rohini Wallace CNM  ADMITTING ATTENDING:  [place] MD  ADMITTING CERTIFIED NURSE MIDWIFE: Rohini Wallace CNM    Chief Complaint   Patient presents with   • Pregnancy     induction of labor     HISTORY OF PRESENT ILLNESS:  Ronda Colindres is a 32 year old White  female, , estimated date of delivery 3/1/2019, by Last Menstrual Period @ 41w0d who presents for induction of labor.   Reports: no complaints. Fetal movement present.   Pregnancy is significant for the following:    Active Hospital Problems    Diagnosis Date Noted   • Encounter for supervision of other normal pregnancy, third trimester 2018     Priority: Low     History of CHTN/GHTN/Preeclampsia.     Plan:  1. Co-manage with Dr. Mancia.  Appointment scheduled at 29 weeks IUP.  2. ASA 81 mg to started at 12.6 weeks.  3. Baseline labs done with first OB visit (6w4d): /80.  H/H/Plt: 12.7/38.8/258, LDH: 127, Uric Acid: 3.3  Creatinine: 0.78, AST: 16, ALT: 31  -  24 hr urine protein: 137.6   FTS: : Negative, AFP: Negative  Anatomy US: 19w2d LMP and current US 4 days different, LNMP date kept 3/1. Female, 23 %Tile, AC 33%, Placenta anterior, no previa, DOREEN norm.  F/U US in 3 weeks.   : 22w6d: normal survey, growth:34 %Tile   26w 6d 31%, breech.  23w labs: H/H: 10.0/30.9 plt: 211, GT: 111, Creatinine: 0.64, Uric acid: 2.6, ALT: 16, ALK: 56, HIV: NR, RPR: NR.  Rhogam given on   TDap given on   28w labs: H/H: 10.0/30.9, Plts: 211. 1Hr GT: 116,  Antibody: Neg,  PIH labs: Creatinine 0.64, AST/ADT: 10/16, LDH: 169, Uric Acid: 2.6  32w growth US: 29 %, cephalic  36w growth US: 53%, cephalic  GBS negative     • Hx of preeclampsia, prior pregnancy, currently pregnant, third trimester 2014     Priority: Low   • Rh negative status during pregnancy in first trimester 2013     Priority: Low     OB History    Para Term   AB Living   2 1 1 0 0 1   SAB TAB Ectopic Molar Multiple Live Births   0 0 0 0   1      # Outcome Date GA Lbr Manav/2nd Weight Sex Delivery Anes PTL Lv   2 Current            1 Term 14 40w0d 21:56 / 05:19 3187 g M Vag-Vacuum EPI N RAPHAEL      Complications: Chorioamnionitis      Birth Comments: Severe preeclampsia, MgSO4.      Obstetric Comments   Gynecological History:      Menarche at age 13, Menses are 28-33 days apart, lasting 6 days and moderate in consistency.      No history of STDs, Chlamydia and Denies all sexually transmitted disease's.    No history of abnormal Pap smears.   Last pap result was Normal on 13.     CURRENT PREGNANCY:   Past Medical History:   Diagnosis Date   • Amenorrhea -2013   • Severe preeclampsia 2014    Late onset, abnormal labs, placed on MgSO4.      History reviewed. No pertinent surgical history.  I have reviewed the patient's medications and allergies, past medical, surgical, social and family history, updating these as appropriate.  See Histories section of the EMR for a display of this information.       SOCIAL HISTORY:  Social History     Tobacco Use   • Smoking status: Former Smoker     Start date: 12/3/2004     Last attempt to quit: 11/15/2013     Years since quittin.3   • Smokeless tobacco: Former User   • Tobacco comment: 07/10/2018 Started back smoking  and quit recently.   Substance Use Topics   • Alcohol use: No     Frequency: Never     Comment: socially not during pregnacy       Sexually Active: Yes             Partners with: Male       Birth Control/Protection: None      Drug Use:    No              Review of patient's social economics indicates:  No social economics status on file      FAMILY HISTORY:  Family History   Problem Relation Age of Onset   • Hypertension Father    • Parkinsonism Father    • Heart Paternal Grandmother    • Cancer Maternal Grandfather        ACTIVE MEDICATIONS:  Outpatient Medications Marked as Taking for the  3/8/19 encounter (Hospital Encounter)   Medication Sig Dispense Refill   • BL EVENING PRIMROSE OIL PO      • aspirin 81 MG tablet Take 1 tablet by mouth daily. 60 tablet 3   • ferrous sulfate 325 (65 FE) MG tablet Take 325 mg by mouth daily (with breakfast).     • PRENATAL VITAMINS PO Take 1 tablet by mouth daily.         ALLERGIES:  ALLERGIES: no known allergies.    REVIEW OF SYSTEMS:    Eye Problem(s): negative  ENT Problem(s): negative  Cardiovascular problem(s): negative  Respiratory problem(s): negative  Gastrointestinal problem(s): negative     Genitourinary problem(s): negative  Musculoskeletal problem(s): negative  Integumentary problem(s): negative  Neurological problem(s): negative  Psychiatric problem(s): negative  Endocrine problem(s): negative  Hematologic and/or Lymphatic problem(s): negative    PHYSICAL EXAM  Visit Vitals  /82 (BP Location: [facility] [place]) Comment: manual   Pulse 101   Temp 98.2 °F (36.8 °C)   Resp 16   Ht 5' 4\" (1.626 m)   Wt 79.3 kg   LMP 05/25/2018 (Approximate)   BMI 30.00 kg/m²       General:   alert, appears stated age and cooperative,  female   Psychiatric:  Appropriate mood and affect   Skin:   normal   HEENT:  neck supple with midline trachea   Lungs:   Respirations unlabored,    Heart:   regular rate and rhythm   Abdomen: Gravid, Non-tender, soft, no masses   Extremities Normal   Fetal Surveillance/  Tocodynamometer: Fetal Heart Rate  Mode: External US  Baseline: 140 bpm  Classification: Normal  Variability: Moderate  Pattern: Accelerations    Uterine Activity  Mode: Lantry  Frequency: irregular  Duration:    Quality: Mild  Resting Tone: Soft       Uterine Size: 39 cm   Presentation: cephalic by SVE, Leopolds       PRENATAL LABS:  Blood type:  O NEGATIVE      Recent Labs   Lab 11/08/18  1210 07/10/18  1228  07/10/18  1218   Neisseria Gonorrhoeae by Nucleic Acid Amplification  --   --   --  NEGATIVE   Chlamydia Trachomatis by Nucleic Acid Amplification  --   --   --   NEGATIVE   HGB 10.0* 12.7   < >  --    HCT 30.9* 38.8   < >  --     258   < >  --    HEP B Surface AG  --  NEGATIVE  --   --    HIV Antigen/Antibody Screen NONREACTIVE NONREACTIVE   < >  --    Rubella Antibody IgG  --  55.9  --   --    RPR Screen NONREACTIVE NONREACTIVE   < >  --     < > = values in this interval not displayed.     Group B Streptococcus:   CULTURE   Date Value Ref Range Status   2019   Final    NEGATIVE FOR STREPTOCOCCUS AGALACTIAE (STREP GROUP B)     HGB (g/dL)   Date Value   2018 10.0 (L)   /  HCT (%)   Date Value   2018 30.9 (L)     Most Recent Pap Smear:   PATH REPORT, PAP   Date Value Ref Range Status   2013   Final    Cytologic Interpretation :        NEGATIVE FOR INTRAEPITHELIAL LESION OR MALIGNANCY.          Satisfactory for evaluation.  Presence of endocervical/transformation zone    component.       First Trimester/Quad Screen: negative  One hour glucose tolerance test: Normal    Results for orders placed in visit on 18   US OB FETAL NUCHAL TRANSLUCENCY 1ST TRIMESTER     IMAGING:  Please see imaging tab for full ultrasound reports  FTS: : Negative, AFP: Negative  Anatomy US: 19w2d LMP and current US 4 days different, LNMP date kept 3/1. Female, 23 %Tile, AC 33%, Placenta anterior, no previa, DOREEN norm.  F/U US in 3 weeks.   : 22w6d: normal survey, growth:34 %Tile   26w 6d 31%, breech.  32w growth US: 29 %, cephalic  36w growth US: 53%, cephalic      ASSESSMENT/PLAN:  Ronda Colindres is a 32 year old  female at 41w0d here for IOL at 41w0d.    Fetal Heart Rate Interpretation: Category I (19)  Estimated Fetal Weight: 7 pounds 14 oz  Postpartum Hemorrhage Risk: Low (19)    Patient Active Problem List   Diagnosis   • Suspect Polyp at cervical os   • Rh negative status during pregnancy in first trimester   • Hx of preeclampsia, prior pregnancy, currently pregnant, third trimester   • Encounter for supervision of other  normal pregnancy, third trimester      1. Preop labs ordered  2. Cervical ripening anticipated  3. IOL for post dates    Disposition: L&D    Rohini Wallace CNM

## 2022-02-08 NOTE — DISCHARGE INSTRUCTIONS
DR. SILVERIO' DISCHARGE INSTRUCTIONS FOLLOWING   SACRAL NERVE STIMULATOR (SNS) STAGE II GENERATOR INSERTION     DIET:  You can resume your regular diet. We encourage you to eat well-balanced and nutritious meals.      ACTIVITY:  Please restrain from strenuous activity or heavy lifting (more than 10 pounds) for the next week.  Please walk daily as much as tolerated, making exercise a part of your daily life. Do not drive while using narcotics for pain control.     WOUND CARE:  You may begin showering the day after surgery. You have skin glue on incision to keep it dry and protected.  Please pat dry and do not vigorously rub.  Do not bath or submerse wound in pool for next month.      MEDICATIONS:  1. Please use over the counter Tylenol or Ibuprofen for pain control as needed  2. Please take a 3 day course of Bactrim  (antibiotic) as prescribed.  3. You may use oxycodone for pain refractory to over the counter meds for 3 days as needed. Please try to avoid using these, however, if not needed.  4. If you are using aspirin, Plavix, or coumadin, please don’t restart these medications for 1 days following your procedure.    FOLLOW-UP:  Please keep in close contact with the [employer] rep and query them with any questions or concerns. They will be in contact with me regarding any issues with programming.     We will call you to schedule your follow up appointment in 2 weeks post operatively with my LIZZETH Richter or myself. If you have not heard from us in 1-2 business days, please call [phone] to schedule your follow-up appointment. You may also contact this number if you have questions or concerns that can be answered by Dr. Silverio’ staff.      WARNING SIGNS:  Fever greater than 101 degrees Fahrenheit, chills, nausea or vomiting, severe or persistent bleeding from the wound, increasing pain, swelling, redness or drainage from the incision.  If you are experiencing these symptoms, call the Urology Clinic or go to your  local PCP or emergency room.     MEDICAL HELP DURING NORMAL BUSINESS HOURS:  Between the hours of 8 AM and 5 PM, please call [phone] to speak with Dr. Josh Hess’ staff.     MEDICAL HELP AFTER HOURS:  If you have a serious emergency such as chest pain, shortness of breath, relentless pain you should call 911. For other urgent problems after hours you may contact the urology physician on call by phoning the [phone]. You may also visit the Emergency room at local hospital for help.     For non-emergent problems such as prescription refills or routine questions, please do your best to contact us during normal business hours. This after-hours number should be used for urgent or emergent questions only.       Josh Hess M.D.   [address] [facility]  [place] [address]   [phone]             ACTIVITY: Rest and take it easy for the first 24 hours.  A responsible adult is recommended to remain with you during that time.  It is normal to feel sleepy.  We encourage you to not do anything that requires balance, judgment or coordination.    MILD FLU-LIKE SYMPTOMS ARE NORMAL. YOU MAY EXPERIENCE GENERALIZED MUSCLE ACHES, THROAT IRRITATION, HEADACHE AND/OR SOME NAUSEA.    FOR 24 HOURS DO NOT:  Drive, operate machinery or run household appliances.  Drink beer or alcoholic beverages.   Make important decisions or sign legal documents.    DIET: To avoid nausea, slowly advance diet as tolerated, avoiding spicy or greasy foods for the first day.  Add more substantial food to your diet according to your physician's instructions.  INCREASE FLUIDS AND FIBER TO AVOID CONSTIPATION.    FOLLOW-UP APPOINTMENT:  A follow-up appointment should be arranged with your doctor; call to schedule.    You should CALL YOUR PHYSICIAN if you develop:  Fever greater than 101 degrees F.  Pain not relieved by medication, or persistent nausea or vomiting.  Excessive bleeding (blood soaking through dressing) or unexpected drainage from the  wound.  Extreme redness or swelling around the incision site, drainage of pus or foul smelling drainage.  Inability to urinate or empty your bladder within 8 hours.  Problems with breathing or chest pain.    You should call 911 if you develop problems with breathing or chest pain.  If you are unable to contact your doctor or surgical center, you should go to the nearest emergency room or urgent care center.      Dr Hess #: [phone]    If any questions arise, call your doctor.  If your doctor is not available, please feel free to call the Surgical Center at [phone].     A registered nurse may call you a few days after your surgery to see how you are doing after your procedure.    MEDICATIONS: Resume taking daily medication.  Take prescribed pain medication with food.  If no medication is prescribed, you may take non-aspirin pain medication if needed.  PAIN MEDICATION CAN BE VERY CONSTIPATING.  Take a stool softener or laxative such as senokot, pericolace, or milk of magnesia if needed.    Prescription given: E-prescribed to pharmacy on record (see page 1 of these instructions).      650 mg of Tylenol given at 9:16 am    If your physician has prescribed pain medication that includes Acetaminophen (Tylenol), do not take additional Acetaminophen (Tylenol) while taking the prescribed medication.    Depression / Suicide Risk    As you are discharged from this [facility] facility, it is important to learn how to keep safe from harming yourself.    Recognize the warning signs:  · Abrupt changes in personality, positive or negative- including increase in energy   · Giving away possessions  · Change in eating patterns- significant weight changes-  positive or negative  · Change in sleeping patterns- unable to sleep or sleeping all the time   · Unwillingness or inability to communicate  · Depression  · Unusual sadness, discouragement and loneliness  · Talk of wanting to die  · Neglect of personal  appearance   · Rebelliousness- reckless behavior  · Withdrawal from people/activities they love  · Confusion- inability to concentrate     If you or a loved one observes any of these behaviors or has concerns about self-harm, here's what you can do:  · Talk about it- your feelings and reasons for harming yourself  · Remove any means that you might use to hurt yourself (examples: pills, rope, extension cords, firearm)  · Get professional help from the community (Mental Health, Substance Abuse, psychological counseling)  · Do not be alone:Call your Safe Contact- someone whom you trust who will be there for you.  · Call your local CRISIS HOTLINE 592-4807 or 423-328-1185  · Call your local Children's Mobile Crisis Response Team [place] (649) 887-5229 or www.[employer]  · Call the toll free National Suicide Prevention Hotlines   · National Suicide Prevention Lifeline 818-203-ZJOI (3407)  National [employer] Line Network 800-SUICIDE (908-3634)    Discharge Education for patients on RICHMOND (Obstructive Sleep Apnea) Protocol    Prior to receiving sedation or anesthesia, we screen all patients for Obstructive Sleep Apnea.  During your screening, you were identified as having Obstructive Sleep Apnea(RICHMOND).    What is Obstructive Sleep Apnea?  Sleep apnea (AP-ne-ah) is a common disorder which involves breathing pauses that occur during sleep.  These can last from 10 seconds to a minute or longer.  Normal breathing resumes often with a loud snort or choking sound.    Sleep apnea occurs in all age groups and both genders but is more common in men and people over 40 years of age.  It has been estimated that as many as 18 million Americans have sleep apnea.  Most people who have sleep apnea don’t know they have it because it only occurs during sleep.  A family member and/or bed partner may first notice the signs of sleep apnea.  Sleep apnea is a chronic (ongoing) condition that disrupts the quality and quantity of your sleep  repeatedly throughout the night.  This often results in excessive daytime sleepiness or fatigue during the day.  It may also contribute to high blood pressure, heart problems, and complications following medications used for surgery and procedures.      We recommend that you should be with an adult observer for at least 24 hours after your sedation/anesthesia.  If you have a CPAP machine, you should wear it during any sleep period (day or night) for the week following your procedure.  We encourage you to sleep on your side or in a sitting position, even with napping.  Lying flat on your back increases the risk of apnea and airway obstruction during your post procedure recovery period.    It is important to prevent over-sedation that could increase your risk for apnea.  Please take all pain medication as directed by your physician.  If you are not getting pain relief, please contact your physician to discuss possible approaches to relieving pain while minimizing medications that can affect your breathing and oxygen levels.

## 2022-02-08 NOTE — ANESTHESIA POSTPROCEDURE EVALUATION
Patient: Tiffany Louis    Procedure Summary     Date: 02/08/22 Room / Location:  OR  / SURGERY [facility]    Anesthesia Start: 0744 Anesthesia Stop:     Procedure: INSERTION, NEUROSTIMULATOR, SACRAL NERVE STIMULATOR (Right Back) Diagnosis: (URGENT DESIRE TO URINATE)    Surgeons: Josh Hess M.D. Responsible Provider: Fred Geronimo M.D.    Anesthesia Type: general ASA Status: 2          Final Anesthesia Type: general  Last vitals  BP   Blood Pressure : 135/66    Temp   36 °C (96.8 °F)    Pulse   77   Resp   16    SpO2   96 %      Anesthesia Post Evaluation    Patient location during evaluation: PACU  Patient participation: complete - patient participated  Level of consciousness: awake and alert  Pain score: 2    Airway patency: patent  Anesthetic complications: no  Cardiovascular status: hemodynamically stable  Respiratory status: acceptable  Hydration status: euvolemic    PONV: none          No complications documented.     Nurse Pain Score: 0 (NPRS)

## 2022-02-08 NOTE — ANESTHESIA TIME REPORT
Anesthesia Start and Stop Event Times     Date Time Event    2/8/2022 0736 Ready for Procedure     0744 Anesthesia Start     0831 Anesthesia Stop        Responsible Staff  02/08/22    Name Role Begin End    Fred Geronimo M.D. Anesth 0744 0831        Preop Diagnosis (Free Text):  Pre-op Diagnosis     URGENT DESIRE TO URINATE        Preop Diagnosis (Codes):    Premium Reason  Non-Premium    Comments:

## 2022-02-16 DIAGNOSIS — G47.33 OSA (OBSTRUCTIVE SLEEP APNEA): ICD-10-CM

## 2022-02-16 RX ORDER — LOSARTAN POTASSIUM AND HYDROCHLOROTHIAZIDE 12.5; 1 MG/1; MG/1
TABLET ORAL
Qty: 90 TABLET | Refills: 2 | Status: SHIPPED | OUTPATIENT
Start: 2022-02-16 | End: 2022-11-07

## 2022-03-17 ENCOUNTER — HOSPITAL ENCOUNTER (OUTPATIENT)
Dept: RADIOLOGY | Facility: MEDICAL CENTER | Age: 77
End: 2022-03-17
Attending: INTERNAL MEDICINE
Payer: MEDICARE

## 2022-03-17 DIAGNOSIS — C50.912 MALIGNANT NEOPLASM OF LEFT FEMALE BREAST, UNSPECIFIED ESTROGEN RECEPTOR STATUS, UNSPECIFIED SITE OF BREAST (HCC): ICD-10-CM

## 2022-03-17 PROCEDURE — 700117 HCHG RX CONTRAST REV CODE 255: Performed by: INTERNAL MEDICINE

## 2022-03-17 PROCEDURE — 71260 CT THORAX DX C+: CPT | Mod: MH

## 2022-03-17 RX ADMIN — IOHEXOL 71 ML: 350 INJECTION, SOLUTION INTRAVENOUS at 14:44

## 2022-03-17 RX ADMIN — IOHEXOL 24 ML: 350 INJECTION, SOLUTION INTRAVENOUS at 14:31

## 2022-05-19 ENCOUNTER — TELEPHONE (OUTPATIENT)
Dept: CARDIOLOGY | Facility: MEDICAL CENTER | Age: 77
End: 2022-05-19
Payer: MEDICARE

## 2022-05-19 NOTE — TELEPHONE ENCOUNTER
Upon chart review, order states below:      Returned Gita's call and reviewed findings.  She verbalizes understanding and states code was approved.  She states no other concerns or questions at this time and is appreciative of information given.

## 2022-05-19 NOTE — TELEPHONE ENCOUNTER
CW    Caller: Tiffany Louis    Name and Department: Gita with [facility]    Topic/Issue: They are needing a new ICD 10 code sent to them . Patient did not pass her Medical necessity lipid panel    Callback Number or Extension: Gita PH: [phone]  Fax: [phone]    Thank You  Marlin BRADY

## 2022-06-01 ENCOUNTER — TELEPHONE (OUTPATIENT)
Dept: MEDICAL GROUP | Facility: PHYSICIAN GROUP | Age: 77
End: 2022-06-01
Payer: MEDICARE

## 2022-06-01 NOTE — TELEPHONE ENCOUNTER
Pt called requesting medication for covid, she states testing positive twice, I explained Dr. Aparicio will need to see her to prescribe her medication. She declined virtual and in person appt.

## 2022-06-13 ENCOUNTER — OFFICE VISIT (OUTPATIENT)
Dept: MEDICAL GROUP | Facility: PHYSICIAN GROUP | Age: 77
End: 2022-06-13
Payer: MEDICARE

## 2022-06-13 VITALS
DIASTOLIC BLOOD PRESSURE: 62 MMHG | OXYGEN SATURATION: 93 % | BODY MASS INDEX: 26.26 KG/M2 | TEMPERATURE: 98.4 F | RESPIRATION RATE: 16 BRPM | WEIGHT: 153.8 LBS | HEART RATE: 84 BPM | HEIGHT: 64 IN | SYSTOLIC BLOOD PRESSURE: 128 MMHG

## 2022-06-13 DIAGNOSIS — Z23 NEED FOR VACCINATION: ICD-10-CM

## 2022-06-13 DIAGNOSIS — I10 ESSENTIAL HYPERTENSION: Chronic | ICD-10-CM

## 2022-06-13 DIAGNOSIS — Z12.12 SCREENING FOR COLORECTAL CANCER: ICD-10-CM

## 2022-06-13 DIAGNOSIS — H69.93 EUSTACHIAN TUBE DYSFUNCTION, BILATERAL: ICD-10-CM

## 2022-06-13 DIAGNOSIS — Z12.11 SCREENING FOR COLORECTAL CANCER: ICD-10-CM

## 2022-06-13 PROCEDURE — 90715 TDAP VACCINE 7 YRS/> IM: CPT | Performed by: FAMILY MEDICINE

## 2022-06-13 PROCEDURE — 99214 OFFICE O/P EST MOD 30 MIN: CPT | Mod: 25 | Performed by: FAMILY MEDICINE

## 2022-06-13 PROCEDURE — 90677 PCV20 VACCINE IM: CPT | Performed by: FAMILY MEDICINE

## 2022-06-13 PROCEDURE — 90471 IMMUNIZATION ADMIN: CPT | Performed by: FAMILY MEDICINE

## 2022-06-13 PROCEDURE — 90472 IMMUNIZATION ADMIN EACH ADD: CPT | Performed by: FAMILY MEDICINE

## 2022-06-13 RX ORDER — AZITHROMYCIN 250 MG/1
TABLET, FILM COATED ORAL
COMMUNITY
Start: 2022-06-03 | End: 2022-06-13

## 2022-06-13 RX ORDER — METHENAMINE HIPPURATE 1000 MG/1
1 TABLET ORAL 2 TIMES DAILY WITH MEALS
COMMUNITY
Start: 2022-06-03 | End: 2023-06-28

## 2022-06-13 RX ORDER — CEPHALEXIN 500 MG/1
CAPSULE ORAL
COMMUNITY
Start: 2022-04-01 | End: 2022-06-13

## 2022-06-13 RX ORDER — ALBUTEROL SULFATE 90 UG/1
AEROSOL, METERED RESPIRATORY (INHALATION)
COMMUNITY
Start: 2022-06-02

## 2022-06-13 RX ORDER — OXYCODONE HYDROCHLORIDE 5 MG/1
TABLET ORAL
COMMUNITY
End: 2022-06-13

## 2022-06-13 RX ORDER — DOXYCYCLINE HYCLATE 100 MG/1
CAPSULE ORAL
COMMUNITY
Start: 2022-06-02 | End: 2022-06-13

## 2022-06-13 RX ORDER — SULFAMETHOXAZOLE AND TRIMETHOPRIM 800; 160 MG/1; MG/1
TABLET ORAL
COMMUNITY
End: 2022-06-13

## 2022-06-13 ASSESSMENT — ENCOUNTER SYMPTOMS
HEARTBURN: 0
EYES NEGATIVE: 1
HEADACHES: 0
BLURRED VISION: 0
DOUBLE VISION: 0
MYALGIAS: 0
FEVER: 0
PALPITATIONS: 0
DIZZINESS: 0
PSYCHIATRIC NEGATIVE: 1
NAUSEA: 0
CARDIOVASCULAR NEGATIVE: 1
NEUROLOGICAL NEGATIVE: 1
DEPRESSION: 0
RESPIRATORY NEGATIVE: 1
GASTROINTESTINAL NEGATIVE: 1
COUGH: 0
MUSCULOSKELETAL NEGATIVE: 1
HEMOPTYSIS: 0
CONSTITUTIONAL NEGATIVE: 1
CHILLS: 0
TINGLING: 0
BRUISES/BLEEDS EASILY: 0

## 2022-06-13 ASSESSMENT — FIBROSIS 4 INDEX: FIB4 SCORE: 1.1

## 2022-06-29 ENCOUNTER — HOSPITAL ENCOUNTER (OUTPATIENT)
Facility: MEDICAL CENTER | Age: 77
End: 2022-06-29
Attending: FAMILY MEDICINE
Payer: MEDICARE

## 2022-06-29 PROCEDURE — 82274 ASSAY TEST FOR BLOOD FECAL: CPT

## 2022-07-07 DIAGNOSIS — Z12.11 SCREENING FOR COLORECTAL CANCER: ICD-10-CM

## 2022-07-07 DIAGNOSIS — Z12.12 SCREENING FOR COLORECTAL CANCER: ICD-10-CM

## 2022-07-09 LAB — IMM ASSAY OCC BLD FITOB: NEGATIVE

## 2022-10-10 ENCOUNTER — PRE-ADMISSION TESTING (OUTPATIENT)
Dept: ADMISSIONS | Facility: MEDICAL CENTER | Age: 77
End: 2022-10-10
Attending: UROLOGY
Payer: MEDICARE

## 2022-10-17 ENCOUNTER — TELEPHONE (OUTPATIENT)
Dept: SLEEP MEDICINE | Facility: MEDICAL CENTER | Age: 77
End: 2022-10-17
Payer: MEDICARE

## 2022-10-17 ENCOUNTER — TELEPHONE (OUTPATIENT)
Dept: MEDICAL GROUP | Facility: PHYSICIAN GROUP | Age: 77
End: 2022-10-17
Payer: MEDICARE

## 2022-10-17 DIAGNOSIS — J31.0 CHRONIC RHINITIS: Primary | ICD-10-CM

## 2022-10-17 NOTE — TELEPHONE ENCOUNTER
Patient did call she is requesting Rx Flonase to be sent to [facility]. Good call back number is [phone].

## 2022-10-18 PROBLEM — M25.532 LEFT WRIST PAIN: Status: ACTIVE | Noted: 2022-10-18

## 2022-10-18 RX ORDER — FLUTICASONE PROPIONATE 50 MCG
1-2 SPRAY, SUSPENSION (ML) NASAL DAILY
Qty: 16 G | Refills: 6 | Status: SHIPPED | OUTPATIENT
Start: 2022-10-18

## 2022-10-18 NOTE — TELEPHONE ENCOUNTER
Pt left message wanting to know what vaccines she received at her visit in June.   Returned call and advised she received the Tdap and pneumonia 20 vaccines.   Pt thanked me for the call.

## 2022-10-28 ENCOUNTER — ANESTHESIA (OUTPATIENT)
Dept: SURGERY | Facility: MEDICAL CENTER | Age: 77
End: 2022-10-28
Payer: MEDICARE

## 2022-10-28 ENCOUNTER — ANESTHESIA EVENT (OUTPATIENT)
Dept: SURGERY | Facility: MEDICAL CENTER | Age: 77
End: 2022-10-28
Payer: MEDICARE

## 2022-10-28 ENCOUNTER — HOSPITAL ENCOUNTER (OUTPATIENT)
Facility: MEDICAL CENTER | Age: 77
End: 2022-10-28
Attending: UROLOGY | Admitting: UROLOGY
Payer: MEDICARE

## 2022-10-28 ENCOUNTER — APPOINTMENT (OUTPATIENT)
Dept: RADIOLOGY | Facility: MEDICAL CENTER | Age: 77
End: 2022-10-28
Attending: UROLOGY
Payer: MEDICARE

## 2022-10-28 VITALS
RESPIRATION RATE: 16 BRPM | DIASTOLIC BLOOD PRESSURE: 69 MMHG | HEART RATE: 70 BPM | OXYGEN SATURATION: 95 % | SYSTOLIC BLOOD PRESSURE: 125 MMHG | HEIGHT: 64 IN | BODY MASS INDEX: 26.76 KG/M2 | WEIGHT: 156.75 LBS | TEMPERATURE: 97.6 F

## 2022-10-28 DIAGNOSIS — N39.3 STRESS INCONTINENCE, FEMALE: Primary | ICD-10-CM

## 2022-10-28 LAB
ANION GAP SERPL CALC-SCNC: 14 MMOL/L (ref 7–16)
BUN SERPL-MCNC: 14 MG/DL (ref 8–22)
CALCIUM SERPL-MCNC: 9.4 MG/DL (ref 8.5–10.5)
CHLORIDE SERPL-SCNC: 88 MMOL/L (ref 96–112)
CO2 SERPL-SCNC: 25 MMOL/L (ref 20–33)
CREAT SERPL-MCNC: 0.55 MG/DL (ref 0.5–1.4)
GFR SERPLBLD CREATININE-BSD FMLA CKD-EPI: 94 ML/MIN/1.73 M 2
GLUCOSE SERPL-MCNC: 107 MG/DL (ref 65–99)
POTASSIUM SERPL-SCNC: 3.4 MMOL/L (ref 3.6–5.5)
SODIUM SERPL-SCNC: 127 MMOL/L (ref 135–145)

## 2022-10-28 PROCEDURE — 160002 HCHG RECOVERY MINUTES (STAT): Performed by: UROLOGY

## 2022-10-28 PROCEDURE — 00910 ANES TRANSURETHRAL PX NOS: CPT | Performed by: STUDENT IN AN ORGANIZED HEALTH CARE EDUCATION/TRAINING PROGRAM

## 2022-10-28 PROCEDURE — 160025 RECOVERY II MINUTES (STATS): Performed by: UROLOGY

## 2022-10-28 PROCEDURE — 700111 HCHG RX REV CODE 636 W/ 250 OVERRIDE (IP): Performed by: STUDENT IN AN ORGANIZED HEALTH CARE EDUCATION/TRAINING PROGRAM

## 2022-10-28 PROCEDURE — L8606 SYNTHETIC IMPLNT URINARY 1ML: HCPCS | Performed by: UROLOGY

## 2022-10-28 PROCEDURE — 99100 ANES PT EXTEME AGE<1 YR&>70: CPT | Performed by: STUDENT IN AN ORGANIZED HEALTH CARE EDUCATION/TRAINING PROGRAM

## 2022-10-28 PROCEDURE — 160046 HCHG PACU - 1ST 60 MINS PHASE II: Performed by: UROLOGY

## 2022-10-28 PROCEDURE — 160009 HCHG ANES TIME/MIN: Performed by: UROLOGY

## 2022-10-28 PROCEDURE — 160029 HCHG SURGERY MINUTES - 1ST 30 MINS LEVEL 4: Performed by: UROLOGY

## 2022-10-28 PROCEDURE — 36415 COLL VENOUS BLD VENIPUNCTURE: CPT

## 2022-10-28 PROCEDURE — 93005 ELECTROCARDIOGRAM TRACING: CPT | Performed by: UROLOGY

## 2022-10-28 PROCEDURE — 700101 HCHG RX REV CODE 250: Performed by: STUDENT IN AN ORGANIZED HEALTH CARE EDUCATION/TRAINING PROGRAM

## 2022-10-28 PROCEDURE — A9270 NON-COVERED ITEM OR SERVICE: HCPCS | Performed by: STUDENT IN AN ORGANIZED HEALTH CARE EDUCATION/TRAINING PROGRAM

## 2022-10-28 PROCEDURE — 160048 HCHG OR STATISTICAL LEVEL 1-5: Performed by: UROLOGY

## 2022-10-28 PROCEDURE — 700101 HCHG RX REV CODE 250: Performed by: UROLOGY

## 2022-10-28 PROCEDURE — 80048 BASIC METABOLIC PNL TOTAL CA: CPT

## 2022-10-28 PROCEDURE — 700105 HCHG RX REV CODE 258: Performed by: STUDENT IN AN ORGANIZED HEALTH CARE EDUCATION/TRAINING PROGRAM

## 2022-10-28 PROCEDURE — 160035 HCHG PACU - 1ST 60 MINS PHASE I: Performed by: UROLOGY

## 2022-10-28 PROCEDURE — 700102 HCHG RX REV CODE 250 W/ 637 OVERRIDE(OP): Performed by: STUDENT IN AN ORGANIZED HEALTH CARE EDUCATION/TRAINING PROGRAM

## 2022-10-28 DEVICE — SYSTEM KIT BULKAMID URETHRAL BULKING (1/EA) ***MUST ORDER A MIN OF 5 EA***: Type: IMPLANTABLE DEVICE | Site: URETHRA | Status: FUNCTIONAL

## 2022-10-28 RX ORDER — ONDANSETRON 2 MG/ML
4 INJECTION INTRAMUSCULAR; INTRAVENOUS
Status: DISCONTINUED | OUTPATIENT
Start: 2022-10-28 | End: 2022-10-28 | Stop reason: HOSPADM

## 2022-10-28 RX ORDER — PHENAZOPYRIDINE HYDROCHLORIDE 100 MG/1
100 TABLET, FILM COATED ORAL 3 TIMES DAILY PRN
Qty: 6 TABLET | Refills: 0 | COMMUNITY
End: 2023-02-15

## 2022-10-28 RX ORDER — ONDANSETRON 2 MG/ML
INJECTION INTRAMUSCULAR; INTRAVENOUS PRN
Status: DISCONTINUED | OUTPATIENT
Start: 2022-10-28 | End: 2022-10-28 | Stop reason: SURG

## 2022-10-28 RX ORDER — OXYCODONE HCL 5 MG/5 ML
5 SOLUTION, ORAL ORAL
Status: DISCONTINUED | OUTPATIENT
Start: 2022-10-28 | End: 2022-10-28 | Stop reason: HOSPADM

## 2022-10-28 RX ORDER — OXYCODONE HCL 5 MG/5 ML
10 SOLUTION, ORAL ORAL
Status: DISCONTINUED | OUTPATIENT
Start: 2022-10-28 | End: 2022-10-28 | Stop reason: HOSPADM

## 2022-10-28 RX ORDER — CEFAZOLIN SODIUM 1 G/3ML
INJECTION, POWDER, FOR SOLUTION INTRAMUSCULAR; INTRAVENOUS PRN
Status: DISCONTINUED | OUTPATIENT
Start: 2022-10-28 | End: 2022-10-28 | Stop reason: SURG

## 2022-10-28 RX ORDER — HALOPERIDOL 5 MG/ML
1 INJECTION INTRAMUSCULAR
Status: DISCONTINUED | OUTPATIENT
Start: 2022-10-28 | End: 2022-10-28 | Stop reason: HOSPADM

## 2022-10-28 RX ORDER — CELECOXIB 200 MG/1
200 CAPSULE ORAL ONCE
Status: COMPLETED | OUTPATIENT
Start: 2022-10-28 | End: 2022-10-28

## 2022-10-28 RX ORDER — SODIUM CHLORIDE, SODIUM LACTATE, POTASSIUM CHLORIDE, CALCIUM CHLORIDE 600; 310; 30; 20 MG/100ML; MG/100ML; MG/100ML; MG/100ML
INJECTION, SOLUTION INTRAVENOUS
Status: DISCONTINUED | OUTPATIENT
Start: 2022-10-28 | End: 2022-10-28 | Stop reason: SURG

## 2022-10-28 RX ORDER — SODIUM CHLORIDE, SODIUM LACTATE, POTASSIUM CHLORIDE, CALCIUM CHLORIDE 600; 310; 30; 20 MG/100ML; MG/100ML; MG/100ML; MG/100ML
INJECTION, SOLUTION INTRAVENOUS CONTINUOUS
Status: DISCONTINUED | OUTPATIENT
Start: 2022-10-28 | End: 2022-10-28 | Stop reason: HOSPADM

## 2022-10-28 RX ORDER — DIPHENHYDRAMINE HYDROCHLORIDE 50 MG/ML
12.5 INJECTION INTRAMUSCULAR; INTRAVENOUS
Status: DISCONTINUED | OUTPATIENT
Start: 2022-10-28 | End: 2022-10-28 | Stop reason: HOSPADM

## 2022-10-28 RX ORDER — MAGNESIUM HYDROXIDE 1200 MG/15ML
LIQUID ORAL
Status: COMPLETED | OUTPATIENT
Start: 2022-10-28 | End: 2022-10-28

## 2022-10-28 RX ORDER — HYDRALAZINE HYDROCHLORIDE 20 MG/ML
5 INJECTION INTRAMUSCULAR; INTRAVENOUS
Status: DISCONTINUED | OUTPATIENT
Start: 2022-10-28 | End: 2022-10-28 | Stop reason: HOSPADM

## 2022-10-28 RX ORDER — LIDOCAINE HYDROCHLORIDE 20 MG/ML
INJECTION, SOLUTION EPIDURAL; INFILTRATION; INTRACAUDAL; PERINEURAL PRN
Status: DISCONTINUED | OUTPATIENT
Start: 2022-10-28 | End: 2022-10-28 | Stop reason: SURG

## 2022-10-28 RX ORDER — HYDROMORPHONE HYDROCHLORIDE 1 MG/ML
0.1 INJECTION, SOLUTION INTRAMUSCULAR; INTRAVENOUS; SUBCUTANEOUS
Status: DISCONTINUED | OUTPATIENT
Start: 2022-10-28 | End: 2022-10-28 | Stop reason: HOSPADM

## 2022-10-28 RX ORDER — ACETAMINOPHEN 500 MG
1000 TABLET ORAL ONCE
Status: COMPLETED | OUTPATIENT
Start: 2022-10-28 | End: 2022-10-28

## 2022-10-28 RX ORDER — DEXAMETHASONE SODIUM PHOSPHATE 4 MG/ML
INJECTION, SOLUTION INTRA-ARTICULAR; INTRALESIONAL; INTRAMUSCULAR; INTRAVENOUS; SOFT TISSUE PRN
Status: DISCONTINUED | OUTPATIENT
Start: 2022-10-28 | End: 2022-10-28 | Stop reason: SURG

## 2022-10-28 RX ORDER — HYDROMORPHONE HYDROCHLORIDE 1 MG/ML
0.4 INJECTION, SOLUTION INTRAMUSCULAR; INTRAVENOUS; SUBCUTANEOUS
Status: DISCONTINUED | OUTPATIENT
Start: 2022-10-28 | End: 2022-10-28 | Stop reason: HOSPADM

## 2022-10-28 RX ORDER — POLYETHYLENE GLYCOL 3350 17 G/17G
17 POWDER, FOR SOLUTION ORAL DAILY
Qty: 14 EACH | Refills: 0 | COMMUNITY
End: 2023-02-15

## 2022-10-28 RX ORDER — OXYCODONE HYDROCHLORIDE 5 MG/1
5-10 TABLET ORAL EVERY 6 HOURS PRN
Qty: 5 TABLET | Refills: 0 | Status: SHIPPED | OUTPATIENT
Start: 2022-10-28 | End: 2022-10-31

## 2022-10-28 RX ORDER — HYDROMORPHONE HYDROCHLORIDE 1 MG/ML
0.2 INJECTION, SOLUTION INTRAMUSCULAR; INTRAVENOUS; SUBCUTANEOUS
Status: DISCONTINUED | OUTPATIENT
Start: 2022-10-28 | End: 2022-10-28 | Stop reason: HOSPADM

## 2022-10-28 RX ADMIN — FENTANYL CITRATE 50 MCG: 50 INJECTION, SOLUTION INTRAMUSCULAR; INTRAVENOUS at 11:34

## 2022-10-28 RX ADMIN — PROPOFOL 150 MG: 10 INJECTION, EMULSION INTRAVENOUS at 11:34

## 2022-10-28 RX ADMIN — SODIUM CHLORIDE, POTASSIUM CHLORIDE, SODIUM LACTATE AND CALCIUM CHLORIDE: 600; 310; 30; 20 INJECTION, SOLUTION INTRAVENOUS at 11:30

## 2022-10-28 RX ADMIN — LIDOCAINE HYDROCHLORIDE 50 MG: 20 INJECTION, SOLUTION EPIDURAL; INFILTRATION; INTRACAUDAL at 11:34

## 2022-10-28 RX ADMIN — CEFAZOLIN 2 G: 330 INJECTION, POWDER, FOR SOLUTION INTRAMUSCULAR; INTRAVENOUS at 11:36

## 2022-10-28 RX ADMIN — ACETAMINOPHEN 1000 MG: 500 TABLET ORAL at 10:35

## 2022-10-28 RX ADMIN — FENTANYL CITRATE 25 MCG: 50 INJECTION, SOLUTION INTRAMUSCULAR; INTRAVENOUS at 11:45

## 2022-10-28 RX ADMIN — ONDANSETRON 4 MG: 2 INJECTION INTRAMUSCULAR; INTRAVENOUS at 11:47

## 2022-10-28 RX ADMIN — DEXAMETHASONE SODIUM PHOSPHATE 4 MG: 4 INJECTION, SOLUTION INTRA-ARTICULAR; INTRALESIONAL; INTRAMUSCULAR; INTRAVENOUS; SOFT TISSUE at 11:36

## 2022-10-28 RX ADMIN — FENTANYL CITRATE 25 MCG: 50 INJECTION, SOLUTION INTRAMUSCULAR; INTRAVENOUS at 11:47

## 2022-10-28 RX ADMIN — CELECOXIB 200 MG: 200 CAPSULE ORAL at 10:36

## 2022-10-28 ASSESSMENT — PAIN SCALES - GENERAL: PAIN_LEVEL: 2

## 2022-10-28 ASSESSMENT — PAIN DESCRIPTION - PAIN TYPE
TYPE: SURGICAL PAIN
TYPE: SURGICAL PAIN

## 2022-10-28 NOTE — OR NURSING
1245: Arrived from PACU AXO, Pt's VSS; denies N/V; states pain is at tolerable level.  Call light within reach.     1315: Pt Dismissed to home. Pt education given. Pt understanding verbalized. VSS. PIV d/c'd with catheter intact. Pt escorted to front entrance via wheelchair.

## 2022-10-28 NOTE — OP REPORT
[facility] [place] Operative Report    Pre-operative Diagnosis: Stress incontinence of urination  Urge icnotinence of urination  S/p SNS device in past with good urge control   Post-operative Diagnosis: Same as above   Procedure: Cystoscopy with urethral bulking agent injection (bulkamid)   Surgeon: Josh Hess M.D., MD   Anesthesia: General, LMA  Anesthesiologist: Ney Leo D.O.   Estimated Blood Loss: minimal   IV fluids See anesthesia record   Specimens: 1. None   Drains: 1. None   Complications and condition: None and stable, procedure well tolerated   Wound class II clean contaminated   Condition: Stable, procedure well tolerated    Disposition:  PACU, home after recovery, follow-up 4 weeks   Findings: 1. normal bladder mucosa, orthotopic Uo's  2.  Injections completed at ~7-8, 4-5, 1-2, and 10-11 oclock quadrants with good cushion noted and coaptation of urethra, 2mL total injection     Indications for Procedure:  Tiffany Louis is a 77 y.o. female with history of  stress incontinence of uriantion refractory to conservative therapies in absence of significant organ prolapse. After discussion of options she has opted for cystoscopy with urethral bulking agent injections.  Risks discussed, but not limited to, were infection, sepsis, bleeding, bladder injury, need for secondary procedure, continued leakage post op, risk of urinary retention and need for catheter, and the cardiovascular and pulmonary complications of anesthesia/surgery including DVT, Mi, PE, and death.    Procedure in Detail:  Ancef was administered prior to the start of the procedure for antimicrobial prophylaxis. Sequential compression devices were placed for deep venous thrombosis prophylaxis. Then, after induction of a general anesthetic, she was placed in lithotomy position and both legs were placed in Nixon stirrups. The perineal area was prepped and draped in a sterile fashion.     The bulkamid urethroscope was then placed into  the bladder which was inspected. The bulkamid needle was then advanced into the needle channel on the rotatable sheath until the tip of the sheath was adjacent to the bladder neck. The sheath was rotated to a 7-8 oclock position and the needle was then extended until he 2cm buddy was visible. The bulkamid system was then retracted until the tip of the needle was resting at the bladder neck. The needle was then retracted into the sheath and approximately 1.5cm from the bladder neck the Bulkamid systme was pressed parallel  against the urethral wall and the needle advanced into the submucosal tissue  ensuring that the bevel of the needle was facing the lumen. The needle was then advanced 0.5cm  and the bulkamind hydrogel was then injected until the cushion was visible and reached the midline of the urethral lumen. The needle was retracted back into the sheath and the scope rotated to the next injection site. Subsequent injections were performed at 4-5 o'clock, 1-2 o'clock and 10-11 o'clock until all 4 cushions met in the midline. 2mL total bulkamid was used. Approximately 200-300mL of fluid was left in the bladder . Excellent hemostasis noted.      The patient was awoken from anesthesia and brought to recovery in satisfactory condition.         Josh Hess MD  [address] [place] [address] [address]  [place] [address]  [phone]

## 2022-10-28 NOTE — DISCHARGE INSTRUCTIONS
DR. SILVERIO' DISCHARGE INSTRUCTIONS FOLLOWING   CYSTOSCOPY AND URETHRAL BULKING AGENT INJECTION (BULKAMID)     DIET:  You can resume your regular diet. We encourage you to eat well-balanced and nutritious meals.      ACTIVITY:  1. Please restrain from strenuous activity or heavy lifting (more than 15 pounds) for two days following your procedure.  Please walk daily as much as tolerated, making exercise a part of your daily life. Do not drive while using narcotics for pain control if you are using them.  Please avoid excessive straining with defecation and use stool softeners  to prevent constipation (miralax and or colace as needed)!  2. If you are unable to urinate, and need a catheter placed either here in recovery, in ER, or back in our office, please let them know you had a recent procedure and only a very small catheter (12F or less) should be placed as it may comprimise the treatment having a larger catehter placed.      WOUND CARE:  You have no dressing or wound to manage.      MEDICATIONS:  1. Please use plain tylenol for pain and stool softeners (Miralax and Colace) as directed.   2. You may use Pyridium or Azo as needed which is an over-the-counter medication for burning for 3 days.  3 If you take aspirin, plavix, coumadin or other blood thinners, please do not take for 1 days following your surgery.    FOLLOW-UP:  We will call you to schedule your follow up appointment in approximately 4 weeks. If you have not heard from us in 1-2 business days, please call [phone] to schedule your follow-up appointment. You may also contact this number if you have questions or concerns that can be answered by Dr. Silverio’ staff.     WARNING SIGNS:  Fever greater than 101 degrees Fahrenheit, chills, nausea or vomiting, Large amount of clots in urine that make it difficult to urinate or for urine to drain from marsh, increasing pain, or abdominal swelling. If you are experiencing these symptoms, call the Urology  Clinic or go to your local PCP or emergency room. If you are having trouble voiding and feel like your bladder is not emptying, please let us know so we can get you into clinic to check your bladder and make sure you don't need a marsh catheter, temporarily.    It is normal to see blood in your urine for up to 2 weeks even from surgery. The urine may clear up entirely, and then turn bloody again a few days later depending on your activity level; do not be alarmed. However, if you experience severe pain or tenderness, have a lot of increased bleeding, or find that you are unable to urinate because of large clots, please notify your doctor immediately     MEDICAL HELP DURING NORMAL BUSINESS HOURS:  Between the hours of 8 AM and 5 PM, please call [phone] to speak with Dr. Josh Hess’ staff.     MEDICAL HELP AFTER HOURS:  If you have a serious emergency such as chest pain, shortness of breath, relentless pain you should call 911. For other urgent problems after hours you may contact the urology physician on call by phoning the [phone]. You may also visit the Emergency room at local hospital for help.     For non-emergent problems such as prescription refills or routine questions, please do your best to contact us during normal business hours. This after-hours number should be used for urgent or emergent questions only.       Josh Hess M.D.   [address] [facility] [place] [address]   [phone]

## 2022-10-28 NOTE — ANESTHESIA POSTPROCEDURE EVALUATION
Patient: Tiffany Louis    Procedure Summary     Date: 10/28/22 Room / Location: [place] [address] / SURGERY [facility]    Anesthesia Start: 1130 Anesthesia Stop: 1200    Procedure: CYSTOSCOPY WITH URETHRAL BULKING AGENT Diagnosis: (MIXED URINARY INCONTINENCE)    Surgeons: Josh Hess M.D. Responsible Provider: Ney Leo D.O.    Anesthesia Type: general ASA Status: 2          Final Anesthesia Type: general  Last vitals  BP   Blood Pressure : (P) 133/61    Temp   (P) 36.6 °C (97.8 °F)    Pulse   (P) 69   Resp   (P) 18    SpO2   (P) 97 %      Anesthesia Post Evaluation    Patient location during evaluation: PACU  Patient participation: complete - patient participated  Level of consciousness: awake and alert  Pain score: 2    Airway patency: patent  Anesthetic complications: no  Cardiovascular status: hemodynamically stable  Respiratory status: acceptable  Hydration status: euvolemic    PONV: none          No notable events documented.     Nurse Pain Score: 0 (NPRS)

## 2022-10-28 NOTE — ANESTHESIA TIME REPORT
Anesthesia Start and Stop Event Times     Date Time Event    10/28/2022 1031 Ready for Procedure     1130 Anesthesia Start     1200 Anesthesia Stop        Responsible Staff  10/28/22    Name Role Begin End    Ney Leo D.O. Anesth 1130 1200        Overtime Reason:  no overtime (within assigned shift)    Comments:

## 2022-10-28 NOTE — OR NURSING
1158- Pt arrives to PACU from OR on 6L of oxygen via mask, OPA removed upon arrival. Report received. Pt denies pain and nausea at this time.     1219- Pt  Thony called and updated on pt status and POC.

## 2022-10-28 NOTE — ANESTHESIA PROCEDURE NOTES
Airway    Date/Time: 10/28/2022 11:35 AM  Performed by: Ney Leo D.O.  Authorized by: Ney Leo D.O.     Location:  OR  Urgency:  Elective  Indications for Airway Management:  Anesthesia      Spontaneous Ventilation: absent    Sedation Level:  Deep  Preoxygenated: Yes    Final Airway Type:  Supraglottic airway  Final Supraglottic Airway:  Standard LMA    SGA Size:  4  Number of Attempts at Approach:  1

## 2022-10-28 NOTE — ANESTHESIA PREPROCEDURE EVALUATION
Case: [id number] Date/Time: 10/28/22 1145    Procedures:       CYSTOSCOPY WITH URETHRAL BULKING AGENT      CYSTOSCOPY    Pre-op diagnosis: MIXED URINARY INCONTINENCE    Location: [facility] OR 18 / SURGERY [facility]    Surgeons: Josh Hess M.D.          Relevant Problems   ANESTHESIA   (positive) RICHMOND (obstructive sleep apnea)      NEURO   (positive) Personal history of malignant neoplasm of breast      CARDIAC   (positive) Essential hypertension       Physical Exam    Airway   Mallampati: II  TM distance: >3 FB  Neck ROM: full       Cardiovascular - normal exam  Rhythm: regular  Rate: normal  (-) murmur     Dental - normal exam           Pulmonary - normal exam  Breath sounds clear to auscultation     Abdominal    Neurological - normal exam                 Anesthesia Plan    ASA 2       Plan - general       Airway plan will be LMA          Induction: intravenous    Postoperative Plan: Postoperative administration of opioids is intended.    Pertinent diagnostic labs and testing reviewed    Informed Consent:    Anesthetic plan and risks discussed with patient.    Use of blood products discussed with: patient whom consented to blood products.

## 2022-10-29 LAB — EKG IMPRESSION: NORMAL

## 2022-10-29 PROCEDURE — 93010 ELECTROCARDIOGRAM REPORT: CPT | Performed by: INTERNAL MEDICINE

## 2022-11-05 DIAGNOSIS — G47.33 OSA (OBSTRUCTIVE SLEEP APNEA): ICD-10-CM

## 2022-11-07 RX ORDER — LOSARTAN POTASSIUM AND HYDROCHLOROTHIAZIDE 12.5; 1 MG/1; MG/1
1 TABLET ORAL DAILY
Qty: 90 TABLET | Refills: 0 | Status: SHIPPED | OUTPATIENT
Start: 2022-11-07 | End: 2023-02-06

## 2022-11-07 NOTE — TELEPHONE ENCOUNTER
Is the patient due for a refill? Yes    Was the patient seen the past year? Yes    Date of last office visit: 9/7/21    Does the patient have an upcoming appointment?  No   If yes, When?     Provider to refill:CW    Does the patients insurance require a 100 day supply?  No

## 2022-11-08 ENCOUNTER — TELEPHONE (OUTPATIENT)
Dept: CARDIOLOGY | Facility: MEDICAL CENTER | Age: 77
End: 2022-11-08
Payer: MEDICARE

## 2022-11-08 NOTE — TELEPHONE ENCOUNTER
90 day courtesy refill sent.    Task deferred to schedulers to schedule for [facility] via staff message.

## 2022-11-08 NOTE — TELEPHONE ENCOUNTER
Talked with this very nice PT- has upcoming husbands surgery.. needs to hold off on scheduling until Feb but will call back to schedule.   SLC

## 2022-11-08 NOTE — TELEPHONE ENCOUNTER
----- Message from Chio Jane R.N. sent at 11/7/2022  5:32 PM PST -----  Please schedule pt for [facility].  Last seen 9/2021.  Thank you!

## 2023-01-17 ENCOUNTER — TELEPHONE (OUTPATIENT)
Dept: NEUROLOGY | Facility: MEDICAL CENTER | Age: 78
End: 2023-01-17
Payer: MEDICARE

## 2023-01-26 ENCOUNTER — APPOINTMENT (OUTPATIENT)
Dept: NEUROLOGY | Facility: MEDICAL CENTER | Age: 78
End: 2023-01-26
Attending: PSYCHIATRY & NEUROLOGY
Payer: MEDICARE

## 2023-02-02 DIAGNOSIS — G47.33 OSA (OBSTRUCTIVE SLEEP APNEA): ICD-10-CM

## 2023-02-06 RX ORDER — LOSARTAN POTASSIUM AND HYDROCHLOROTHIAZIDE 12.5; 1 MG/1; MG/1
1 TABLET ORAL DAILY
Qty: 90 TABLET | Refills: 0 | Status: SHIPPED | OUTPATIENT
Start: 2023-02-06 | End: 2023-04-25 | Stop reason: SDUPTHER

## 2023-02-10 ENCOUNTER — TELEPHONE (OUTPATIENT)
Dept: NEUROLOGY | Facility: MEDICAL CENTER | Age: 78
End: 2023-02-10
Payer: MEDICARE

## 2023-02-13 PROBLEM — M16.9 OSTEOARTHRITIS OF HIP: Status: ACTIVE | Noted: 2023-02-13

## 2023-02-15 ENCOUNTER — TELEPHONE (OUTPATIENT)
Dept: CARDIOLOGY | Facility: MEDICAL CENTER | Age: 78
End: 2023-02-15

## 2023-02-15 ENCOUNTER — OFFICE VISIT (OUTPATIENT)
Dept: NEUROLOGY | Facility: MEDICAL CENTER | Age: 78
End: 2023-02-15
Attending: PSYCHIATRY & NEUROLOGY
Payer: MEDICARE

## 2023-02-15 VITALS
SYSTOLIC BLOOD PRESSURE: 124 MMHG | TEMPERATURE: 97.7 F | WEIGHT: 149.91 LBS | BODY MASS INDEX: 25.73 KG/M2 | DIASTOLIC BLOOD PRESSURE: 86 MMHG | HEART RATE: 85 BPM | OXYGEN SATURATION: 97 %

## 2023-02-15 DIAGNOSIS — R26.89 MULTIFACTORIAL GAIT DISORDER: ICD-10-CM

## 2023-02-15 PROCEDURE — 99212 OFFICE O/P EST SF 10 MIN: CPT | Performed by: PSYCHIATRY & NEUROLOGY

## 2023-02-15 PROCEDURE — 99213 OFFICE O/P EST LOW 20 MIN: CPT | Performed by: PSYCHIATRY & NEUROLOGY

## 2023-02-15 ASSESSMENT — PATIENT HEALTH QUESTIONNAIRE - PHQ9: CLINICAL INTERPRETATION OF PHQ2 SCORE: 0

## 2023-02-15 NOTE — PROGRESS NOTES
"[facility] NEUROLOGY  FOLLOW-UP VISIT    CC: multifactorial gait disorder    INTERVAL HISTORY:  Tiffany Louis is a 77 y.o. woman with multifactorial gait disorder and a history otherwise notable for breast cancer, carotid artery stenosis, arthritis, multiple joint replacements, and RICHMOND.  I last saw her in the clinic on 6/4/2021.  At that time I recommended she continue regular exercise.  Today, she was unaccompanied, and she provided the following interval history:    Tiffany continues to have gait difficulty.  She has a tendency to shuffle.  The last fall occurred about 3 weeks ago.  Her feet \"got tangled up.\"  She \"didn't step right,\" and she fell sideways.    Tiffany plans to undergo hip replacement on the left soon.  The left hip was replaced many years ago.    Otherwise, she has not noticed any new symptoms.    MEDICATIONS:  Current Outpatient Medications   Medication Sig    losartan-hydrochlorothiazide (HYZAAR) 100-12.5 MG per tablet Take 1 Tablet by mouth every day. **LAST SEEN 9/2021 .  TO ENSURE FURTHER REFILLS, KEEP SCHEDULED FOLLOW UP VISIT 4/25/23.**    fluticasone (FLONASE) 50 MCG/ACT nasal spray Administer 1-2 Sprays into affected nostril(S) every day. Each nostril.    Rotigotine (NEUPRO) 3 MG/24HR PATCH 24 HR Neupro 3 mg/24 hour transdermal 24 hour patch    albuterol 108 (90 Base) MCG/ACT Aero Soln inhalation aerosol inhale 2 puffs by mouth and INTO THE LUNGS every 4 to 6 hours if needed for cough or wheezing    methenamine hip (HIPPREX) 1 GM Tab Take 1 g by mouth 2 times a day with meals.    Magnesium Hydroxide (DULCOLAX PO) Take 2 Tablets by mouth every day.    NON SPECIFIED Take 4 Tablets by mouth every day. D Mannose, OTC for bladder health     Cholecalciferol (VITAMIN D3 PO) Take 4,000 Units by mouth every day.    ferrous sulfate 325 (65 Fe) MG tablet Take 325 mg by mouth every day.    doxylamine (UNISOM) 25 MG Tab tablet Take 25 mg by mouth every bedtime.    Desvenlafaxine Succinate " 100 MG TABLET SR 24 HR Take 1 Tab by mouth every morning. Indications: Major Depressive Disorder    folic acid (FOLVITE) 400 MCG tablet Take 400 mcg by mouth every day.    Coenzyme Q10 (COQ10 PO) Take 300 mg by mouth every day.    aspirin 81 MG tablet Take 81 mg by mouth every evening.    acetaminophen (TYLENOL) 500 MG Tab Take 500-1,000 mg by mouth every 6 hours as needed.    ARIPiprazole (ABILIFY) 5 MG tablet Take 5 mg by mouth every morning. Indications: Major Depressive Disorder    alprazolam (XANAX) 0.5 MG TABS Take 0.5 mg by mouth at bedtime as needed for Anxiety. Indications: Feeling Anxious    Probiotic Product (PROBIOTIC DAILY PO) Take 1 Cap by mouth every day.    polyethylene glycol/lytes (MIRALAX) 17 g Pack Take 1 Packet by mouth every day. Please take to prevent constipation following your procedure.  Hold if loose stools    phenazopyridine (PYRIDIUM) 100 MG Tab Take 1 Tablet by mouth 3 times a day as needed (bladder pain, dysuria).    estradiol (ESTRACE) 0.1 MG/GM vaginal cream estradiol 0.01% (0.1 mg/gram) vaginal cream    COLLAGEN PO Take 1 Each by mouth every day. powder (Patient not taking: Reported on 2/15/2023)     MEDICAL, SOCIAL, AND FAMILY HISTORY:  There is no change in the patient's ROS or PFSH from their previous visit on 6/4/2021.    REVIEW OF SYSTEMS:  A ROS was completed.  Pertinent positives and negatives were included in the HPI, above.  All other systems were reviewed and are negative.    PHYSICAL EXAM:  General/Medical:  - NAD  - reduced ROM of the left shoulder     Neuro:  MENTAL STATUS: awake and alert; no deficits of speech or language; oriented to conversation; affect was appropriate to situation; pleasant, cooperative     CRANIAL NERVES:    II: acuity was: J1/J1 with trifocals; fields intact to confrontation; pupils 3/3 to 2/2 without a relative afferent pupillary defect; discs sharp    III/IV/VI: versions intact without nystagmus; no restriction of vertical gaze; choppy  pursuits    V: facial sensation symmetric to light touch    VII: facial expression symmetric    VIII: hearing intact to finger rub; hard of hearing on the right side    IX/X: palate elevates symmetrically    XI: shoulder shrug symmetric    XII: tongue midline     MOTOR:  - bulk was normal  - tone was normal in the upper extremities (reduced ROM in the left shoulder)  Upper Extremity Strength  (R/L)    5/5   Elbow flexion 5/5   Elbow extension 5/5   Shoulder abduction 4/5      Lower Extremity Strength  (R/L)   Hip flexion 5/5   Knee extension 5/5   Knee flexion NT   Ankle plantarflexion NT   Ankle dorsiflexion NT     - no pronator drift; no abnormal movements     SENSATION:  - light touch: NT  - vibration (R/L, seconds): NT at the great toes  - pinprick: reduced over the right foot  - proprioception: intact at the great toes  - Romberg: absent     COORDINATION:  - finger to nose was normal, no ataxia on exam  - finger tapping was mildly slowed, bilaterally     REFLEXES:  Reflex Right Left   BR 1+ 1+   Biceps 1+ 1+   Triceps NT NT   Patellae 1+ 1+   Achilles NT NT   Toes NT NT      GAIT:  - no assistive devices  - slightly wide-based  - heel-raised and toe-raised gait: NT  - tandem gait: NT    REVIEW OF IMAGING STUDIES:  No new images since the last visit.    REVIEW OF LABORATORY STUDIES:  No new/pertinent data since the last visit.    ASSESSMENT:  Tiffany Louis is a 77 y.o. woman with multifactorial gait disorder and history otherwise notable for breast cancer, carotid artery stenosis, arthritis, multiple joint replacements, and RICHMOND.  There have been additional falls since the last visit.  Her presentation continues to be most consistent with a multifactorial cause.  At this point, I do not recommend any additional work-up.  I encouraged continued exercise and PT.  Tiffany declined a referral for additional PT since she would not continue exercises at home on her own.  She will contact the clinic if symptoms  "worsen or if new neurologic symptoms develop.    PLAN:  Multifactorial Gait Disorder  - continue exercise  - no additional workup at this time    Follow-Up:  - Return if symptoms worsen or fail to improve.    Signed: Felix Hernandez M.D.    BILLING DOCUMENTATION:   I spent 25 minutes reviewing the medical record, interviewing and examining the patient, discussing my impression (see \"assessment\" above), and coordinating care.  "

## 2023-02-15 NOTE — TELEPHONE ENCOUNTER
Received walk in form from edgard LOPEZ regarding pt clearance request.  Upon chart review, pt was last seen 9/2021 with FV 4/25/23.  Appt notes updated.Documents imported to media tab for reference.

## 2023-03-16 ENCOUNTER — APPOINTMENT (OUTPATIENT)
Dept: ADMISSIONS | Facility: MEDICAL CENTER | Age: 78
End: 2023-03-16
Payer: MEDICARE

## 2023-03-20 ENCOUNTER — PRE-ADMISSION TESTING (OUTPATIENT)
Dept: ADMISSIONS | Facility: MEDICAL CENTER | Age: 78
End: 2023-03-20
Payer: MEDICARE

## 2023-03-20 VITALS — BODY MASS INDEX: 25.73 KG/M2 | HEIGHT: 64 IN

## 2023-03-20 RX ORDER — LOSARTAN POTASSIUM 100 MG/1
TABLET ORAL
COMMUNITY
Start: 2023-03-18

## 2023-03-20 NOTE — PREPROCEDURE INSTRUCTIONS
Pre-admit telephone appointment completed. Pt states all instructions given are understood. Medications the patient will take the morning of surgery per anesthesia protocol:  Abilify, Pristiq, Hipprex. Instructed on other prescribed medications per protocol.    Will bring CPAP

## 2023-03-23 ENCOUNTER — TELEPHONE (OUTPATIENT)
Dept: CARDIOLOGY | Facility: MEDICAL CENTER | Age: 78
End: 2023-03-23
Payer: MEDICARE

## 2023-03-23 NOTE — TELEPHONE ENCOUNTER
CW    **Did advise Hanane that pt hasn't been seen since 2021, but has an appt w/ MORA on 04-**      Caller: Hanane - [facility]    Office Name, phone number, fax number: [facility] / ph. [phone] / fax: [phone]    Fax clearance to [phone] on 01-    Procedure Name: Mid-urethral sling    Procedure Scheduled Date: 04-    Callback Number: [phone]

## 2023-03-23 NOTE — TELEPHONE ENCOUNTER
Reviewed faxes received and received fax from [facility] (P: [phone]  F: [phone] ) requesting:    - cardiac clearance for upcoming midurethral sling, retropubic, cystoscopy scheduled 4/7/2023.  - anticoagulant hold 7 days prior to procedure.    Upon chart review, pt last seen 9/7/2021.  Fax completed with findings.      Fax sent to scanning for reference via [employer] completed status received.    Appt notes updated.

## 2023-04-25 ENCOUNTER — OFFICE VISIT (OUTPATIENT)
Dept: CARDIOLOGY | Facility: MEDICAL CENTER | Age: 78
End: 2023-04-25
Payer: MEDICARE

## 2023-04-25 VITALS
OXYGEN SATURATION: 98 % | DIASTOLIC BLOOD PRESSURE: 64 MMHG | HEIGHT: 64 IN | RESPIRATION RATE: 16 BRPM | HEART RATE: 78 BPM | WEIGHT: 153 LBS | BODY MASS INDEX: 26.12 KG/M2 | SYSTOLIC BLOOD PRESSURE: 112 MMHG

## 2023-04-25 DIAGNOSIS — G47.33 OSA (OBSTRUCTIVE SLEEP APNEA): ICD-10-CM

## 2023-04-25 DIAGNOSIS — Z98.890 HISTORY OF LEFT HEART CATHETERIZATION: Chronic | ICD-10-CM

## 2023-04-25 DIAGNOSIS — Z95.2 S/P TAVR (TRANSCATHETER AORTIC VALVE REPLACEMENT): ICD-10-CM

## 2023-04-25 DIAGNOSIS — I10 ESSENTIAL HYPERTENSION: Chronic | ICD-10-CM

## 2023-04-25 PROCEDURE — 99214 OFFICE O/P EST MOD 30 MIN: CPT | Performed by: INTERNAL MEDICINE

## 2023-04-25 RX ORDER — LOSARTAN POTASSIUM AND HYDROCHLOROTHIAZIDE 12.5; 1 MG/1; MG/1
1 TABLET ORAL DAILY
Qty: 90 TABLET | Refills: 3 | Status: SHIPPED | OUTPATIENT
Start: 2023-04-25

## 2023-04-25 NOTE — LETTER
PROCEDURE/SURGERY CLEARANCE FORM      Encounter Date: 4/25/2023    Patient: Tiffany Louis  YOB: 1945    CARDIOLOGIST:  Evelio Alfaro M.D.    REFERRING DOCTOR:  MACKENZIE        The above patient is cleared to have the following procedure/surgery: hip replacement or other ortho surgery                                           Additional comments: She can proceed with the proposed procedure or surgery from a cardiac standpoint, no modifiable cardiovascular risk, no further cardiac testing required, hold antiplatelet as necessary.    It is my pleasure to participate in the care of Ms. Louis.  Please do not hesitate to contact me with questions or concerns. [facility] Cardiology is available 24/7 for consultative services at [phone] in the perioperative period.    Electronically Signed    Evelio Alfaro MD PhD [facility]  Cardiologist [facility]    Please note that this dictation was created using voice recognition software. There may be errors I did not discover before finalizing the note.

## 2023-04-25 NOTE — LETTER
PROCEDURE/SURGERY CLEARANCE FORM      Encounter Date: 4/25/2023    Patient: Tiffany Louis  YOB: 1945    CARDIOLOGIST:  Evelio Alfaro M.D.    REFERRING DOCTOR:  Urology      The above patient is cleared to have the following procedure/surgery: urology surgery                                           Additional comments: She can proceed with the proposed procedure or surgery from a cardiac standpoint, no modifiable cardiovascular risk, no further cardiac testing required, hold antiplatelet as necessary.    It is my pleasure to participate in the care of Ms. Louis.  Please do not hesitate to contact me with questions or concerns. [facility] Cardiology is available 24/7 for consultative services at [phone] in the perioperative period.    Electronically Signed    Evelio Alfaro MD PhD [facility]  Cardiologist [facility]    Please note that this dictation was created using voice recognition software. There may be errors I did not discover before finalizing the note.

## 2023-04-25 NOTE — PROGRESS NOTES
"Chief Complaint   Patient presents with    Other     F/V Dx: Cardiac clearance hip replacement with MACKENZIE and [facility] [place] for   midurethral sling, retopubic, cysoscopy    Aortic Stenosis     F/V Dx: Moderate aortic stenosis    Hypertension       Subjective     Tiffany Louis is a 78 y.o. female who presents today   With TAVR     Plans bladder sling left hip replacement  Past Medical History:   Diagnosis Date    Anemia     Anesthesia     \"very low blood pressure\" Low oxygen    Aortic valve stenosis, severe 07/20/2020    Aortic valve replacement    Arrhythmia     Arthritis     osteo, \"all over\"    Bilateral carotid artery stenosis     Breast cancer (HCC) 07/2015    left    Breath shortness     from aortic stenosis; much improved since aortic valve replacement    Cancer (HCC)     skin    Cancer ([facility]) 2015    breast, Left    Cataract     Bilateral IOL    Delayed emergence from general anesthesia     Dental disorder     upper implants front teeth    Depression     Essential hypertension     GERD (gastroesophageal reflux disease)     Hard to intubate     Heart murmur     History of left heart catheterization - normal 2020 prior to TAVR     Joint replacement     bilateral knees and right hip    Nasal drainage     Obesity     Osteoporosis     Psychiatric problem     anxiety/depression    Renal disorder 06/11/2020    kidney stone    Restless leg syndrome     Sleep apnea     CPAP    Snoring     Umbilical hernia 10/2016    Unspecified urinary incontinence     Urinary bladder disorder 06/21/2017    reports freq infections but none in the past year; on ABX, + bacteria in \"gut\"; last UTI 10/2021     Past Surgical History:   Procedure Laterality Date    HI ENDOSCOPIC INJECTION/IMPLANT  10/28/2022    Procedure: CYSTOSCOPY WITH URETHRAL BULKING AGENT;  Surgeon: Josh Hess M.D.;  Location: SURGERY [facility];  Service: Urology    HI INSERTION/RPLCMT PERIPHERAL/GASTRIC NPGR Right 02/08/2022    Procedure: INSERTION, " NEUROSTIMULATOR, SACRAL NERVE STIMULATOR;  Surgeon: Josh Hess M.D.;  Location: [facility];  Service: Urology    OR OPEN IMPLANT NEUROSTIMULATOR SACRAL N/A 01/26/2022    Procedure: INSERTION, ELECTRODE LEADS AND PULSE GENERATOR, NEUROSTIMULATOR, SACRAL - STAGE 1 LEAD PLACEMENT;  Surgeon: Josh Hess M.D.;  Location: [facility];  Service: Urology    TRANSCATHETER AORTIC VALVE REPLACEMENT  07/20/2020    Procedure: REPLACEMENT, AORTIC VALVE, TRANSCATHETER;  Surgeon: Mukesh Reid M.D.;  Location: [facility];  Service: Cardiac    NEHA  07/20/2020    Procedure: ECHOCARDIOGRAM, TRANSESOPHAGEAL-;  Surgeon: Mukesh Reid M.D.;  Location: [facility];  Service: Cardiac    LUNG BIOPSY OPEN Right 06/2020    LATISSIMUS FLAP Left 06/27/2017    Procedure: LATISSIMUS FLAP W/INSERTION OF IMPLANT;  Surgeon: Johnny Flannery M.D.;  Location: [facility];  Service:     BREAST IMPLANT REMOVAL Left 06/27/2017    Procedure: BREAST IMPLANT REMOVAL;  Surgeon: Johnny Flannery M.D.;  Location: [facility];  Service:     CAPSULECTOMY  04/13/2017    and debridement left breast    BOWEL RESECTION  12/28/2016    BREAST RECONSTRUCTION Bilateral 11/29/2016    Procedure: BREAST RECONSTRUCTION;  Surgeon: Johnny Flannery M.D.;  Location: [facility];  Service:     LATISSIMUS FLAP Right 11/29/2016    Procedure: LATISSIMUS FLAP W/ IMPLANT;  Surgeon: Johnny Flannery M.D.;  Location: [facility];  Service:     TISSUE EXPANDER PLACE/REMOVE Left 11/29/2016    Procedure: TISSUE EXPANDER REMOVE - REMOVAL AND INSERTION OF IMPLANT;  Surgeon: Johnny Flannery M.D.;  Location: [facility];  Service:     CAPSULECTOMY Bilateral 11/29/2016    Procedure: CAPSULECTOMY;  Surgeon: Johnny Flannery M.D.;  Location: [facility];  Service:     OTHER SURGICAL PROCEDURE Right 07/2016    debridement and breast expander  placement    BREAST IMPLANT REMOVAL Right 01/06/2016    Procedure: BREAST IMPLANT REMOVAL, placement of drain;  Surgeon: Jon Leblanc M.D.;  Location: SURGERY [facility];  Service:     OTHER SURGICAL PROCEDURE Right 01/2016    second debridement right breast    CATH PLACEMENT Right 09/08/2015    Procedure: CATH PLACEMENT PORT;  Surgeon: Vargas Hyde M.D.;  Location: SURGERY [facility];  Service:     OTHER SURGICAL PROCEDURE  09/08/2015    Port placement for chemo    MASTECTOMY MODIFIED RADICAL Left 08/10/2015    Procedure: MASTECTOMY MODIFIED RADICAL;  Surgeon: Vargas Hyde M.D.;  Location: [facility];  Service:     MASTECTOMY Right 08/10/2015    Procedure: MASTECTOMY SIMPLE;  Surgeon: Vargas Hyde M.D.;  Location: SURGERY [facility];  Service:     NODE BIOPSY SENTINEL Left 08/10/2015    Procedure: NODE BIOPSY SENTINEL;  Surgeon: Vargas Hyde M.D.;  Location: [facility];  Service:     BREAST RECONSTRUCTION Bilateral 08/10/2015    Procedure: BREAST RECONSTRUCTION VIA;  Surgeon: Johnny Flannery M.D.;  Location: [facility];  Service:     TISSUE EXPANDER PLACE/REMOVE Bilateral 08/10/2015    Procedure: TISSUE EXPANDER PLACE/REMOVE & POSSIBLE ALLODERM;  Surgeon: Johnny Flannery M.D.;  Location: [facility];  Service:     ROTATOR CUFF REPAIR Right 2009    right    HIP ARTHROPLASTY TOTAL Right 2008    Hip Replacement, Total, right    HAND SURGERY Right 02/12/2007    joint Right Thumb    ABDOMINOPLASTY  10/28/2004    GASTRIC BYPASS LAPAROSCOPIC  10/10/2001    KNEE ARTHROPLASTY TOTAL Bilateral 01/24/2000    bilateral knees    OTHER  09/1999    tumor exc Right hip    INGRID BY LAPAROSCOPY  09/1997    BLADDER SUSPENSION  12/1993    HYSTERECTOMY, TOTAL ABDOMINAL  05/1993    rectal, bladder repair    EAR MIDDLE EXPLORATION Left 04/1990    Left    BREAST BIOPSY Left 06/1985    TUBAL LIGATION  03/1984    TONSILLECTOMY  1952    GYN SURGERY      KNEE  "ARTHROSCOPY Left 1983, 12/97    Left    KNEE ARTHROSCOPY Right 6/88, 12/91, 11/97    Right    OTHER CARDIAC SURGERY      OTHER SURGICAL PROCEDURE  4/06, 10/06    bilateral \"arm lift\"    OTHER SURGICAL PROCEDURE  5/97, 4/05/05    vaginal repair    SLEEVE,ARLEEN VASO THIGH       Family History   Problem Relation Age of Onset    Kidney Disease Mother     Cancer Mother     Breast Cancer Mother     Heart Failure Father     Hypertension Father      Social History     Socioeconomic History    Marital status:      Spouse name: Not on file    Number of children: Not on file    Years of education: Not on file    Highest education level: Not on file   Occupational History    Not on file   Tobacco Use    Smoking status: Never    Smokeless tobacco: Never   Vaping Use    Vaping Use: Never used   Substance and Sexual Activity    Alcohol use: Yes     Alcohol/week: 0.6 oz     Types: 1 Standard drinks or equivalent per week     Comment: Maybe once a week    Drug use: No    Sexual activity: Not on file   Other Topics Concern    Not on file   Social History Narrative    Not on file     Social Determinants of Health     Financial Resource Strain: Not on file   Food Insecurity: Not on file   Transportation Needs: Not on file   Physical Activity: Not on file   Stress: Not on file   Social Connections: Not on file   Intimate Partner Violence: Not on file   Housing Stability: Not on file     Allergies   Allergen Reactions    Ampicillin Shortness of Breath and Swelling     Tolerated Cefazolin on 08/10/15    Clindamycin Shortness of Breath and Rash     .    Levaquin Shortness of Breath    Penicillins Shortness of Breath and Rash     Tolerated Ancef 08/10/15    Amlodipine      Swelling, lower extremitites    Demerol Itching     Itching and rash      Lisinopril Cough     Outpatient Encounter Medications as of 4/25/2023   Medication Sig Dispense Refill    losartan-hydrochlorothiazide (HYZAAR) 100-12.5 MG per tablet Take 1 Tablet by mouth " every day. **LAST SEEN 9/2021 .  TO ENSURE FURTHER REFILLS, KEEP SCHEDULED FOLLOW UP VISIT 4/25/23.** 90 Tablet 0    fluticasone (FLONASE) 50 MCG/ACT nasal spray Administer 1-2 Sprays into affected nostril(S) every day. Each nostril. 16 g 6    albuterol 108 (90 Base) MCG/ACT Aero Soln inhalation aerosol inhale 2 puffs by mouth and INTO THE LUNGS every 4 to 6 hours if needed for cough or wheezing      methenamine hip (HIPPREX) 1 GM Tab Take 1 g by mouth 2 times a day with meals.      Magnesium Hydroxide (DULCOLAX PO) Take 2 Tablets by mouth every day.      NON SPECIFIED Take 4 Tablets by mouth every day. D Mannose, OTC for bladder health       Cholecalciferol (VITAMIN D3 PO) Take 4,000 Units by mouth every day.      ferrous sulfate 325 (65 Fe) MG tablet Take 325 mg by mouth every day.      doxylamine (UNISOM) 25 MG Tab tablet Take 25 mg by mouth every bedtime.      Desvenlafaxine Succinate 100 MG TABLET SR 24 HR Take 1 Tab by mouth every morning. Indications: Major Depressive Disorder      folic acid (FOLVITE) 400 MCG tablet Take 400 mcg by mouth every day.      Coenzyme Q10 (COQ10 PO) Take 300 mg by mouth every day.      aspirin 81 MG tablet Take 81 mg by mouth every evening.      acetaminophen (TYLENOL) 500 MG Tab Take 500-1,000 mg by mouth every 6 hours as needed.      ARIPiprazole (ABILIFY) 5 MG tablet Take 5 mg by mouth every morning. Indications: Major Depressive Disorder      alprazolam (XANAX) 0.5 MG TABS Take 0.5 mg by mouth at bedtime as needed for Anxiety. Indications: Feeling Anxious      Probiotic Product (PROBIOTIC DAILY PO) Take 1 Cap by mouth every day.      losartan (COZAAR) 100 MG Tab take 1 tablet by mouth once daily for blood pressure (Patient not taking: Reported on 4/25/2023)       No facility-administered encounter medications on file as of 4/25/2023.     ROS           Objective     /64 (BP Location: Left arm, Patient Position: Sitting, BP Cuff Size: Adult)   Pulse 78   Resp 16   Ht  "1.626 m (5' 4\")   Wt 69.4 kg (153 lb)   SpO2 98%   BMI 26.26 kg/m²     Physical Exam  Constitutional:       General: She is not in acute distress.     Appearance: She is not diaphoretic.   Eyes:      General: No scleral icterus.  Neck:      Vascular: No JVD.   Cardiovascular:      Rate and Rhythm: Normal rate.      Heart sounds: Murmur heard.     No friction rub. No gallop.   Pulmonary:      Effort: No respiratory distress.      Breath sounds: No wheezing or rales.   Abdominal:      General: Bowel sounds are normal.      Palpations: Abdomen is soft.   Musculoskeletal:      Right lower leg: No edema.      Left lower leg: No edema.   Skin:     Findings: No rash.   Neurological:      Mental Status: She is alert. Mental status is at baseline.   Psychiatric:         Mood and Affect: Mood normal.              Assessment & Plan     1. S/P TAVR (transcatheter aortic valve replacement)        2. History of left heart catheterization - normal 2020 prior to TAVR        3. Essential hypertension            Medical Decision Making: Today's Assessment/Status/Plan:        It was my pleasure to meet with Ms. Louis.    We addressed the management of hypertension at today's visit. Blood pressure is well controlled.  We specifically assessed the labs on hypertension treatment    Lipids are naturally normal      For either bladder sling or hip replacement  She can proceed with the proposed procedure or surgery from a cardiac standpoint, no modifiable cardiovascular risk, no further cardiac testing required, hold antiplatelet as necessary.    It is my pleasure to participate in the care of Ms. Louis.  Please do not hesitate to contact me with questions or concerns. [facility] Cardiology is available 24/7 for consultative services at [phone] in the perioperative period.    Electronically Signed    Evelio Alfaro MD PhD FACC  Cardiologist [facility]    Please note that this dictation was " created using voice recognition software. There may be errors I did not discover before finalizing the note.

## 2023-05-09 NOTE — LETTER
"     [facility]   [address]   [address]  [place] [address]  Phone: [phone]  Fax: [phone]              Tiffany Louis  1945    Encounter Date: 3/26/2018    Josh Gomez M.D.          PROGRESS NOTE:  Chief Complaint   Patient presents with   • Heart Murmur       Subjective:   Tiffany Louis is a 72 y.o. female who presents today to discuss a recent carotid ultrasound demonstrating mild carotid plaquing.  She also brings in the lab work which demonstrates an untreated LDL cholesterol of 60.  The question is whether she should be on no lipid-lowering therapy or not.  Overall her health is good.  She does have mild aortic stenosis and mild carotid plaquing.  No symptoms of heart disease.  We are following her every 6-12 months for mild aortic stenosis.    It is also mentioned in one imaging report that she has some atherosclerosis of her ascending aorta    Past Medical History:   Diagnosis Date   • Anemia    • Anesthesia     \"very low blood pressure\"   • Arthritis     osteo, \"all over\"   • Breast cancer (CMS-[facility])     left   • Cancer (CMS-[facility])     skin   • Cancer (CMS-[facility]) 2015    breast   • Dental disorder     upper implants front teeth   • Heart burn    • Heart murmur    • Hypertension    • Indigestion    • Joint replacement     bilateral knees and right hip   • Psychiatric problem     anxiety/depression   • Sleep apnea     CPAP   • Snoring    • Umbilical hernia 10/2016   • Unspecified urinary incontinence    • Urinary bladder disorder 6/21/2017    reports freq infections but none in the past year      Past Surgical History:   Procedure Laterality Date   • LATISSIMUS FLAP Left 6/27/2017    Procedure: LATISSIMUS FLAP W/INSERTION OF IMPLANT;  Surgeon: Johnny Flannery M.D.;  Location: SURGERY [facility];  Service:    • BREAST IMPLANT REMOVAL Left 6/27/2017    Procedure: BREAST IMPLANT REMOVAL;  Surgeon: Johnny CABALLERO" SONJA Flannery;  Location: [facility];  Service:    • CAPSULECTOMY  4/13/2017    and debridement left breast   • BOWEL RESECTION  12/28/2016   • BREAST RECONSTRUCTION Bilateral 11/29/2016    Procedure: BREAST RECONSTRUCTION;  Surgeon: Johnny Flannery M.D.;  Location: [facility];  Service:    • LATISSIMUS FLAP Right 11/29/2016    Procedure: LATISSIMUS FLAP W/ IMPLANT;  Surgeon: Johnny Flannery M.D.;  Location: [facility];  Service:    • TISSUE EXPANDER PLACE/REMOVE Left 11/29/2016    Procedure: TISSUE EXPANDER REMOVE - REMOVAL AND INSERTION OF IMPLANT;  Surgeon: Johnny Flannery M.D.;  Location: [facility];  Service:    • CAPSULECTOMY Bilateral 11/29/2016    Procedure: CAPSULECTOMY;  Surgeon: Johnny Flannery M.D.;  Location: [facility];  Service:    • OTHER SURGICAL PROCEDURE Right 7/2016    debridement and breast expander placement   • BREAST IMPLANT REMOVAL Right 1/6/2016    Procedure: BREAST IMPLANT REMOVAL, placement of drain;  Surgeon: Jon Leblanc M.D.;  Location: [facility];  Service:    • OTHER SURGICAL PROCEDURE Right 1/2016     second debridement right breast   • CATH PLACEMENT Right 9/8/2015    Procedure: CATH PLACEMENT PORT;  Surgeon: Vargas Hyde M.D.;  Location: [facility];  Service:    • OTHER SURGICAL PROCEDURE  9/8/2015    Port placement for chemo   • MASTECTOMY MODIFIED RADICAL Left 8/10/2015    Procedure: MASTECTOMY MODIFIED RADICAL;  Surgeon: Vargas Hyde M.D.;  Location: [facility];  Service:    • MASTECTOMY Right 8/10/2015    Procedure: MASTECTOMY SIMPLE;  Surgeon: Vargas Hyde M.D.;  Location: [facility];  Service:    • NODE BIOPSY SENTINEL Left 8/10/2015    Procedure: NODE BIOPSY SENTINEL;  Surgeon: Vargas Hyde M.D.;  Location: [facility];  Service:    • BREAST RECONSTRUCTION Bilateral 8/10/2015    Procedure: BREAST RECONSTRUCTION VIA;   "Surgeon: Johnny Flannery M.D.;  Location: SURGERY [facility];  Service:    • TISSUE EXPANDER PLACE/REMOVE Bilateral 8/10/2015    Procedure: TISSUE EXPANDER PLACE/REMOVE & POSSIBLE ALLODERM;  Surgeon: Johnny Flannery M.D.;  Location: SURGERY [facility];  Service:    • ROTATOR CUFF REPAIR Right 2009    right   • HIP ARTHROPLASTY TOTAL Right 2008    Hip Replacement, Total, right   • HAND SURGERY Right 2/12/07    joint Right Thumb   • ABDOMINOPLASTY  10/28/04   • GASTRIC BYPASS LAPAROSCOPIC  10/10/01   • KNEE ARTHROPLASTY TOTAL Bilateral 1/24/00    bilateral knees   • OTHER  9/99    tumor exc Right hip   • INGRID BY LAPAROSCOPY  9/97   • BLADDER SUSPENSION  12/93   • HYSTERECTOMY, TOTAL ABDOMINAL  5/93    rectal, bladder repair   • EAR MIDDLE EXPLORATION Left 4/90    Left   • BREAST BIOPSY Left 6/85   • TUBAL LIGATION  3/84   • KNEE ARTHROSCOPY Left 1983, 12/97    Left   • KNEE ARTHROSCOPY Right 6/88, 12/91, 11/97    Right   • OTHER  5/97, 4/05/05    vaginal repair   • OTHER SURGICAL PROCEDURE  4/06, 10/06    bilateral \"arm lift\"     History reviewed. No pertinent family history.  Social History     Social History   • Marital status:      Spouse name: N/A   • Number of children: N/A   • Years of education: N/A     Occupational History   • Not on file.     Social History Main Topics   • Smoking status: Never Smoker   • Smokeless tobacco: Never Used   • Alcohol use Yes      Comment: 2-3 per month   • Drug use: No   • Sexual activity: Not on file     Other Topics Concern   • Not on file     Social History Narrative   • No narrative on file     Allergies   Allergen Reactions   • Clindamycin Shortness of Breath and Rash     .   • Levaquin Shortness of Breath   • Penicillins Shortness of Breath and Rash     Tolerated Ancef 08/10/15   • Ampicillin      Tolerated Cefazolin on 08/10/15   • Demerol Itching     Itching and rash       Outpatient Encounter Prescriptions as of 3/26/2018   Medication Sig Dispense " Refill   • Cholecalciferol (VITAMIN D3 PO) Take 4,000 Units by mouth.     • Coenzyme Q10 (COQ10 PO) Take 300 mg by mouth every day.     • Cyanocobalamin (VITAMIN B-12) 1000 MCG Tab Take 2,000 mcg by mouth every day.     • Multiple Vitamins-Minerals (MULTIVITAMIN ADULT PO) Take  by mouth every day.     • aspirin 81 MG tablet Take 81 mg by mouth every day.     • ferrous gluconate (FERGON) 324 (38 FE) MG Tab Take 324 mg by mouth every morning with breakfast.     • acetaminophen (TYLENOL) 500 MG Tab Take 500-1,000 mg by mouth every 6 hours as needed.     • aripiprazole (ABILIFY) 5 MG tablet Take 5 mg by mouth every morning. Indications: Major Depressive Disorder     • zolpidem (AMBIEN CR) 12.5 MG CR tablet Take 12.5 mg by mouth at bedtime as needed for Sleep. Indications: Trouble Sleeping     • Desvenlafaxine Succinate (PRISTIQ) 100 MG TB24 Take 1 Tab by mouth every morning. Indications: Major Depressive Disorder     • esomeprazole (NEXIUM) 40 MG delayed-release capsule Take 40 mg by mouth every morning. Indications: Gastroesophageal Reflux Disease with Current Symptoms     • alprazolam (XANAX) 0.5 MG TABS Take 0.5 mg by mouth as needed for Anxiety. Indications: Feeling Anxious     • Probiotic Product (PROBIOTIC DAILY PO) Take 1 Cap by mouth every day.     • CELEBREX 200 MG PO CAPS Take 200 mg by mouth. Takes 3x per week on Mon, Wed, Fri  Indications: Joint Damage causing Pain and Loss of Function     • FLONASE 50 MCG/ACT NA SUSP Spray 1 Spray in nose as needed. Indications: Hayfever       No facility-administered encounter medications on file as of 3/26/2018.      Review of Systems   Constitutional: Negative for chills, fever and malaise/fatigue.   Eyes: Negative for blurred vision and discharge.   Respiratory: Negative for cough and shortness of breath.    Cardiovascular: Negative for chest pain, palpitations, leg swelling and PND.   Gastrointestinal: Negative for heartburn and nausea.   Genitourinary: Negative for  "dysuria and urgency.   Musculoskeletal: Positive for back pain and joint pain. Negative for myalgias.   Skin: Negative for itching and rash.   Neurological: Negative for dizziness and headaches.   Endo/Heme/Allergies: Negative for environmental allergies. Does not bruise/bleed easily.   Psychiatric/Behavioral: Negative for depression. The patient is not nervous/anxious.         Objective:   /74   Pulse 88   Ht 1.626 m (5' 4\")   Wt 65.3 kg (144 lb)   BMI 24.72 kg/m²      Physical Exam   Constitutional: She is oriented to person, place, and time.   Neck: No JVD present.   Cardiovascular: Normal rate and regular rhythm.    Murmur heard.   Systolic murmur is present with a grade of 2/6   Pulses:       Carotid pulses are 3+ on the right side, and 3+ on the left side.       Radial pulses are 3+ on the right side, and 3+ on the left side.   Pulmonary/Chest: Effort normal and breath sounds normal.   Musculoskeletal: She exhibits no edema.   Neurological: She is alert and oriented to person, place, and time.   Skin: Skin is warm and dry.       Assessment:     1. Aortic stenosis, mild  ECHOCARDIOGRAM COMP W/O CONT   2. Atherosclerosis         Medical Decision Making:  Today's Assessment / Status / Plan:   With the untreated LDL cholesterol of 60 and minimal plaquing of carotids and some calcification of aorta and aortic valve, I would favor not using a statin at this time.  She is on aspirin 81 mg per day.  Return in December with a previsit echo in the absence of symptoms      COSTA Tracy M.D.  [address] [place] [address] [place] [address]  VIA Facsimile: [phone]                 " no Lee/STd

## 2023-07-10 PROBLEM — M16.12 PRIMARY OSTEOARTHRITIS OF LEFT HIP: Status: ACTIVE | Noted: 2023-02-13

## 2023-08-04 ENCOUNTER — HOSPITAL ENCOUNTER (OUTPATIENT)
Dept: RADIOLOGY | Facility: MEDICAL CENTER | Age: 78
End: 2023-08-04
Attending: INTERNAL MEDICINE
Payer: MEDICARE

## 2023-08-04 DIAGNOSIS — C50.912 MALIGNANT NEOPLASM OF LEFT FEMALE BREAST, UNSPECIFIED ESTROGEN RECEPTOR STATUS, UNSPECIFIED SITE OF BREAST (HCC): ICD-10-CM

## 2023-08-04 PROCEDURE — 700117 HCHG RX CONTRAST REV CODE 255: Performed by: INTERNAL MEDICINE

## 2023-08-04 PROCEDURE — 71260 CT THORAX DX C+: CPT

## 2023-08-04 RX ADMIN — IOHEXOL 75 ML: 350 INJECTION, SOLUTION INTRAVENOUS at 11:30

## 2023-10-01 PROBLEM — M19.011 ARTHROPATHY OF RIGHT SHOULDER: Status: ACTIVE | Noted: 2023-10-01

## 2023-11-21 ENCOUNTER — APPOINTMENT (OUTPATIENT)
Dept: SLEEP MEDICINE | Facility: MEDICAL CENTER | Age: 78
End: 2023-11-21
Attending: INTERNAL MEDICINE
Payer: MEDICARE

## 2023-11-28 ENCOUNTER — HOSPITAL ENCOUNTER (OUTPATIENT)
Dept: RADIOLOGY | Facility: MEDICAL CENTER | Age: 78
End: 2023-11-28

## 2023-11-28 ENCOUNTER — HOSPITAL ENCOUNTER (OUTPATIENT)
Dept: RADIOLOGY | Facility: MEDICAL CENTER | Age: 78
End: 2023-11-28
Attending: INTERNAL MEDICINE
Payer: MEDICARE

## 2023-11-28 DIAGNOSIS — N39.0 URINARY TRACT INFECTION WITHOUT HEMATURIA, SITE UNSPECIFIED: ICD-10-CM

## 2023-11-28 DIAGNOSIS — C50.912 MALIGNANT NEOPLASM OF LEFT FEMALE BREAST, UNSPECIFIED ESTROGEN RECEPTOR STATUS, UNSPECIFIED SITE OF BREAST (HCC): ICD-10-CM

## 2023-11-28 PROCEDURE — 76642 ULTRASOUND BREAST LIMITED: CPT | Mod: LT

## 2024-01-11 ENCOUNTER — TELEPHONE (OUTPATIENT)
Dept: HEALTH INFORMATION MANAGEMENT | Facility: OTHER | Age: 79
End: 2024-01-11
Payer: MEDICARE

## 2024-01-21 RX ORDER — SULFAMETHOXAZOLE AND TRIMETHOPRIM 800; 160 MG/1; MG/1
1 TABLET ORAL 2 TIMES DAILY
Qty: 20 TABLET | Refills: 0 | Status: SHIPPED | OUTPATIENT
Start: 2024-01-21

## 2024-01-21 NOTE — PROGRESS NOTES
Pt called into weekend service with new redness to her right shoulder.  She is s/p 1 mo TSA with Dr. Reddy.  No new pain with passive or active ROM.  No fevers, chills, new onset malaise.  She is visiting family in [place] today.  Will order bactrim (allergic to pcn) and have her follow up with Dr. CARDOZA this week in clinic.  Reviewed s/sx of a joint infection and gave her strict ER precautions.  Discussed the case with Dr. Murguia, who is also on call today

## 2024-02-04 NOTE — PROGRESS NOTES
COVID-19 Pre-Surgery Screenin. Do you have an undiagnosed respiratory illness or symptoms such as coughing or sneezing? NO     • Onset of Sx: NO    • Acute vs. chronic respiratory illness: NO     2. Do you have an unexplained fever greater than 100.4 degrees Fahrenheit or 38 degrees Celsius? NO  ?  3. Have you had direct exposure to a patient who tested positive for Covid-19? NO    4. Have you traveled outside of [place] within the last 14 days? NO    5. Patient informed of current visitation and mask policies by this RN.     Results   Component Value Date/Time    TROPHS 4.1 06/28/2023 12:19 PM      Coags Lab Results   Component Value Date/Time    PROTIME 13.7 07/30/2023 03:24 AM    PROTIME 14.7 07/16/2019 08:54 PM    INR 1.0 07/30/2023 03:24 AM    INR 1.2 07/16/2019 08:54 PM    APTT 26.0 07/30/2023 03:24 AM      A1c Lab Results   Component Value Date/Time    LABA1C 5.4 06/29/2023 07:05 AM    LABA1C 5.4 07/25/2022 05:23 AM     06/29/2023 07:05 AM     07/25/2022 05:23 AM      Lipids No results found for: \"CHOL\", \"LDLCALC\", \"HDL\", \"CHOLHDLRATIO\", \"TRIG\"   Thyroid  No results found for: \"TSHELE\", \"WPD5HSZ\"     Most Recent UA Lab Results   Component Value Date/Time    COLORU YELLOW/STRAW 02/02/2024 06:44 PM    APPEARANCE CLEAR 02/02/2024 06:44 PM    SPECGRAV 1.030 02/02/2024 06:44 PM    LABPH 5.5 02/02/2024 06:44 PM    PROTEINU TRACE 02/02/2024 06:44 PM    GLUCOSEU Negative 02/02/2024 06:44 PM    KETUA 40 02/02/2024 06:44 PM    BILIRUBINUR Negative 02/02/2024 06:44 PM    BILIRUBINUR Negative 03/27/2022 12:05 PM    BLOODU TRACE 02/02/2024 06:44 PM    UROBILINOGEN 0.2 02/02/2024 06:44 PM    NITRU Negative 02/02/2024 06:44 PM    LEUKOCYTESUR Negative 02/02/2024 06:44 PM    WBCUA 0-4 02/02/2024 06:44 PM    RBCUA 0-5 02/02/2024 06:44 PM    EPITHUA 0-5 02/02/2024 06:44 PM    BACTERIA Negative 02/02/2024 06:44 PM    LABCAST 0-2 02/02/2024 06:44 PM    MUCUS 0 07/29/2023 03:28 AM        Microbiology:  Results       Procedure Component Value Units Date/Time    Culture, Blood 1 [[id number]] Collected: 02/02/24 1512    Order Status: Completed Specimen: Blood Updated: 02/03/24 0735     Special Requests --        RIGHT  FOREARM       Culture NO GROWTH AFTER 15 HOURS       Culture, Blood 1 [[id number]] Collected: 02/02/24 1426    Order Status: Completed Specimen: Blood Updated: 02/03/24 0735     Special Requests --        LEFT  HAND       Culture NO GROWTH AFTER 16 HOURS               All Labs from Last 24 Hrs:  No results found for this or

## 2024-06-06 PROBLEM — M79.671 PAIN IN RIGHT FOOT: Status: ACTIVE | Noted: 2024-06-06

## 2024-06-06 PROBLEM — M25.571 PAIN IN RIGHT ANKLE: Status: ACTIVE | Noted: 2024-06-06

## 2024-06-12 ENCOUNTER — TELEPHONE (OUTPATIENT)
Dept: CARDIOLOGY | Facility: MEDICAL CENTER | Age: 79
End: 2024-06-12
Payer: MEDICARE

## 2024-06-12 DIAGNOSIS — G47.33 OSA (OBSTRUCTIVE SLEEP APNEA): ICD-10-CM

## 2024-06-12 DIAGNOSIS — I10 ESSENTIAL HYPERTENSION: ICD-10-CM

## 2024-06-12 RX ORDER — LOSARTAN POTASSIUM AND HYDROCHLOROTHIAZIDE 12.5; 1 MG/1; MG/1
1 TABLET ORAL DAILY
Qty: 90 TABLET | Refills: 0 | Status: SHIPPED | OUTPATIENT
Start: 2024-06-12 | End: 2024-06-14 | Stop reason: SDUPTHER

## 2024-06-12 NOTE — TELEPHONE ENCOUNTER
CW    Received request via: Patient    Was the patient seen in the last year in this department? Yes    Does the patient have an active prescription (recently filled or refills available) for medication(s) requested? No    Pharmacy Name:     [id number]  [place] - [address] [facility] AT [facility] [[id number]]     Does the patient have assisted Plus and need 100 day supply (blood pressure, diabetes and cholesterol meds only)? Patient does not have SCP

## 2024-06-12 NOTE — TELEPHONE ENCOUNTER
To Schedulers, Last OV 4/2023 please schedule FV, thank you!    ====================    Lab ordered.    90 day courtesy refill sent.

## 2024-06-13 NOTE — PROGRESS NOTES
"Chief Complaint   Patient presents with    Aortic Stenosis     F/v Dx:S/P TAVR (transcatheter aortic valve replacement)       Subjective     Tiffany Louis is a 79 y.o. female who presents today for annual follow-up.    Patient followed by Dr. Alfaro last seen in clinic on 4/25/2023.  She has a history of severe aortic stenosis s/p TAVR, breast cancer, essential hypertension, sleep apnea on CPAP.  When last seen in clinic was planning on getting a bladder sling and having her left hip replaced.    Today in clinic patient is doing very well, over this last year has had 3 bladder surgeries and a right reverse shoulder and left hip surgery. Almost done with PT, recovering well.  Has had no cardiac complaints no chest pain no shortness of breath no changes to her medications.      Past Medical History:   Diagnosis Date    Anemia     Anesthesia     \"very low blood pressure\" Low oxygen    Aortic valve stenosis, severe 07/20/2020    Aortic valve replacement    Arrhythmia     Arthritis     osteo, \"all over\"    Bilateral carotid artery stenosis     Breast cancer (HCC) 07/2015    left    Breath shortness     from aortic stenosis; much improved since aortic valve replacement    Cancer (HCC)     skin    Cancer (HCC) 2015    breast, Left    Cataract     Bilateral IOL    Delayed emergence from general anesthesia     Dental disorder     upper implants front teeth    Depression     Essential hypertension     GERD (gastroesophageal reflux disease)     Hard to intubate     Heart murmur     History of left heart catheterization - normal 2020 prior to TAVR     Joint replacement     bilateral knees and right hip    Nasal drainage     Obesity     Osteoporosis     Psychiatric problem     anxiety/depression    Renal disorder 06/11/2020    kidney stone    Restless leg syndrome     Sleep apnea     CPAP    Snoring     Umbilical hernia 10/2016    Unspecified urinary incontinence     Urinary bladder disorder 06/21/2017    reports freq " "infections but none in the past year; on ABX, + bacteria in \"gut\"; last UTI 10/2021     Past Surgical History:   Procedure Laterality Date    PB RECONSTR TOTAL SHOULDER IMPLANT Right 12/13/2023    Procedure: RIGHT REVERSE TOTAL SHOULDER ARTHROPLASTY;  Surgeon: Alexy Reddy M.D.;  Location: [facility];  Service: Orthopedics    HI TOTAL HIP ARTHROPLASTY Left 8/10/2023    Procedure: LEFT ANTERIOR TOTAL HIP ARTHROPLASTY;  Surgeon: Mukesh Macias M.D.;  Location: [facility];  Service: Orthopedics    HI ENDOSCOPIC INJECTION/IMPLANT  10/28/2022    Procedure: CYSTOSCOPY WITH URETHRAL BULKING AGENT;  Surgeon: Josh Hess M.D.;  Location: [facility];  Service: Urology    HI INS/RPL PRPH SAC/GSTR NPG/R Right 02/08/2022    Procedure: INSERTION, NEUROSTIMULATOR, SACRAL NERVE STIMULATOR;  Surgeon: Josh Hess M.D.;  Location: [facility];  Service: Urology    HI OPEN IMPLANT NEUROSTIMULATOR SACRAL N/A 01/26/2022    Procedure: INSERTION, ELECTRODE LEADS AND PULSE GENERATOR, NEUROSTIMULATOR, SACRAL - STAGE 1 LEAD PLACEMENT;  Surgeon: Josh Hess M.D.;  Location: [facility];  Service: Urology    TRANSCATHETER AORTIC VALVE REPLACEMENT  07/20/2020    Procedure: REPLACEMENT, AORTIC VALVE, TRANSCATHETER;  Surgeon: Mukesh Reid M.D.;  Location: [facility];  Service: Cardiac    NEHA  07/20/2020    Procedure: ECHOCARDIOGRAM, TRANSESOPHAGEAL-;  Surgeon: Mukesh Reid M.D.;  Location: [facility];  Service: Cardiac    LUNG BIOPSY OPEN Right 06/2020    LATISSIMUS FLAP Left 06/27/2017    Procedure: LATISSIMUS FLAP W/INSERTION OF IMPLANT;  Surgeon: Johnny Flannery M.D.;  Location: [facility];  Service:     BREAST IMPLANT REMOVAL Left 06/27/2017    Procedure: BREAST IMPLANT REMOVAL;  Surgeon: Johnny Flannery M.D.;  Location: [facility];  Service:     CAPSULECTOMY  04/13/2017    " and debridement left breast    BOWEL RESECTION  12/28/2016    BREAST RECONSTRUCTION Bilateral 11/29/2016    Procedure: BREAST RECONSTRUCTION;  Surgeon: Johnny Flannery M.D.;  Location: [facility];  Service:     LATISSIMUS FLAP Right 11/29/2016    Procedure: LATISSIMUS FLAP W/ IMPLANT;  Surgeon: Johnny Flannery M.D.;  Location: [facility];  Service:     TISSUE EXPANDER PLACE/REMOVE Left 11/29/2016    Procedure: TISSUE EXPANDER REMOVE - REMOVAL AND INSERTION OF IMPLANT;  Surgeon: Johnny Flannery M.D.;  Location: [facility];  Service:     CAPSULECTOMY Bilateral 11/29/2016    Procedure: CAPSULECTOMY;  Surgeon: Johnny Flannery M.D.;  Location: [facility];  Service:     OTHER SURGICAL PROCEDURE Right 07/2016    debridement and breast expander placement    BREAST IMPLANT REMOVAL Right 01/06/2016    Procedure: BREAST IMPLANT REMOVAL, placement of drain;  Surgeon: Jon Leblanc M.D.;  Location: [facility];  Service:     OTHER SURGICAL PROCEDURE Right 01/2016    second debridement right breast    CATH PLACEMENT Right 09/08/2015    Procedure: CATH PLACEMENT PORT;  Surgeon: Vargas Hyde M.D.;  Location: [facility];  Service:     OTHER SURGICAL PROCEDURE  09/08/2015    Port placement for chemo    MASTECTOMY MODIFIED RADICAL Left 08/10/2015    Procedure: MASTECTOMY MODIFIED RADICAL;  Surgeon: Vargas Hyde M.D.;  Location: [facility];  Service:     MASTECTOMY Right 08/10/2015    Procedure: MASTECTOMY SIMPLE;  Surgeon: Vargas Hyde M.D.;  Location: [facility];  Service:     NODE BIOPSY SENTINEL Left 08/10/2015    Procedure: NODE BIOPSY SENTINEL;  Surgeon: Vargas Hyde M.D.;  Location: [facility];  Service:     BREAST RECONSTRUCTION Bilateral 08/10/2015    Procedure: BREAST RECONSTRUCTION VIA;  Surgeon: Johnny Flannery M.D.;  Location: [facility];  Service:     TISSUE  "EXPANDER PLACE/REMOVE Bilateral 08/10/2015    Procedure: TISSUE EXPANDER PLACE/REMOVE & POSSIBLE ALLODERM;  Surgeon: Johnny Flannery M.D.;  Location: [facility];  Service:     ROTATOR CUFF REPAIR Right 2009    right    HIP ARTHROPLASTY TOTAL Right 2008    Hip Replacement, Total, right    HAND SURGERY Right 02/12/2007    joint Right Thumb    ABDOMINOPLASTY  10/28/2004    GASTRIC BYPASS LAPAROSCOPIC  10/10/2001    KNEE ARTHROPLASTY TOTAL Bilateral 01/24/2000    bilateral knees    OTHER  09/1999    tumor exc Right hip    INGRID BY LAPAROSCOPY  09/1997    BLADDER SUSPENSION  12/1993    HYSTERECTOMY, TOTAL ABDOMINAL  05/1993    rectal, bladder repair    EAR MIDDLE EXPLORATION Left 04/1990    Left    BREAST BIOPSY Left 06/1985    TUBAL LIGATION  03/1984    TONSILLECTOMY  1952    GYN SURGERY      KNEE ARTHROSCOPY Left 1983, 12/97    Left    KNEE ARTHROSCOPY Right 6/88, 12/91, 11/97    Right    OTHER CARDIAC SURGERY      OTHER SURGICAL PROCEDURE  4/06, 10/06    bilateral \"arm lift\"    OTHER SURGICAL PROCEDURE  5/97, 4/05/05    vaginal repair    SLEEVE,ARLEEN VASO THIGH       Family History   Problem Relation Age of Onset    Kidney Disease Mother     Cancer Mother     Breast Cancer Mother     Heart Failure Father     Hypertension Father      Social History     Socioeconomic History    Marital status:      Spouse name: Not on file    Number of children: Not on file    Years of education: Not on file    Highest education level: Not on file   Occupational History    Not on file   Tobacco Use    Smoking status: Never     Passive exposure: Never    Smokeless tobacco: Never   Vaping Use    Vaping status: Never Used   Substance and Sexual Activity    Alcohol use: Yes     Alcohol/week: 0.6 oz     Types: 1 Standard drinks or equivalent per week     Comment: Maybe once a week    Drug use: No    Sexual activity: Not on file   Other Topics Concern    Not on file   Social History Narrative    Not on file     Social " Determinants of Health     Financial Resource Strain: Not on file   Food Insecurity: Not on file   Transportation Needs: Not on file   Physical Activity: Not on file   Stress: Not on file   Social Connections: Not on file   Intimate Partner Violence: Not on file   Housing Stability: Not on file     Allergies   Allergen Reactions    Ampicillin Shortness of Breath, Swelling and Rash     Tolerated Cefazolin on 08/10/15    Clindamycin Shortness of Breath, Rash and Unspecified     .    Levaquin Shortness of Breath    Penicillins Shortness of Breath, Rash and Unspecified     Tolerated Ancef 08/10/15  Other reaction(s): Review Dt: 02/25/2019    Amlodipine      Swelling, lower extremitites    Demerol Itching, Unspecified and Rash     Itching and rash      Levofloxacin Unspecified     Other reaction(s): rash    Penicillin G Rash    Lisinopril Cough and Rash     Outpatient Encounter Medications as of 6/14/2024   Medication Sig Dispense Refill    losartan-hydrochlorothiazide (HYZAAR) 100-12.5 MG per tablet Take 1 Tablet by mouth every day. 90 Tablet 3    phenazopyridine (PYRIDIUM) 100 MG Tab 2 tablets after meals Orally Three times a day for 2 day(s)      sulfamethoxazole-trimethoprim (BACTRIM DS) 800-160 MG tablet Take 1 Tablet by mouth 2 times a day. 20 Tablet 0    magnesium oxide (MAG-OX) 400 MG Tab tablet Take 400 mg by mouth every day.      aspirin 81 MG EC tablet 81 mg.      augmented betamethasone dipropionate (DIPROLENE-AF) 0.05 % ointment apply to SEVERELY INFLAMED ON LEGS AND ARMS twice a day for 5 day...  (REFER TO PRESCRIPTION NOTES).      cephALEXin (KEFLEX) 500 MG Cap take 4 capsules by mouth 1 hour to 2 hours PRIOR TO DENTAL APPOINTMENT      cyproheptadine (PERIACTIN) 4 MG Tab take 1 tablet by mouth twice a day for itching      fluvoxAMINE (LUVOX) 50 MG Tab take 1 to 2 tablets by mouth once daily      methenamine hip (HIPPREX) 1 GM Tab Take 1 Tablet by mouth 2 times a day with meals.      oxyCODONE  immediate-release (ROXICODONE) 5 MG Tab oxycodone 5 mg tablet   TAKE 1 TABLET BY MOUTH EVERY 4 HOURS FOR 3 DAYS AS NEEDED FOR PAIN      ARIPiprazole (ABILIFY) 5 MG tablet 5 mg.      Desvenlafaxine Succinate (PRISTIQ) 100 MG TABLET SR 24  mg.      NEUPRO 3 MG/24HR PATCH 24 HR       fluticasone (FLONASE) 50 MCG/ACT nasal spray Administer 1-2 Sprays into affected nostril(S) every day. Each nostril. 16 g 6    Cholecalciferol (VITAMIN D3 PO) Take 4,000 Units by mouth every day.      ferrous sulfate 325 (65 Fe) MG tablet Take 325 mg by mouth every day.      doxylamine (UNISOM) 25 MG Tab tablet Take 25 mg by mouth every bedtime.      folic acid (FOLVITE) 400 MCG tablet Take 400 mcg by mouth every day.      Coenzyme Q10 (COQ10 PO) Take 300 mg by mouth every day.      acetaminophen (TYLENOL) 500 MG Tab Take 500-1,000 mg by mouth every 6 hours as needed.      ARIPiprazole (ABILIFY) 5 MG tablet Take 5 mg by mouth every morning. Indications: Major Depressive Disorder      alprazolam (XANAX) 0.5 MG TABS Take 0.5 mg by mouth at bedtime as needed for Anxiety. Indications: Feeling Anxious      Probiotic Product (PROBIOTIC DAILY PO) Take 1 Cap by mouth every day.      [DISCONTINUED] losartan-hydrochlorothiazide (HYZAAR) 100-12.5 MG per tablet Take 1 Tablet by mouth every day. Complete non fasting lab and call [phone] to schedule follow up visit.  Last seen 4/2023. (Patient not taking: Reported on 6/14/2024) 90 Tablet 0    [DISCONTINUED] Biotin 10 MG Cap Take 10 mg by mouth every day. (Patient not taking: Reported on 6/14/2024)      [DISCONTINUED] ALPRAZolam (XANAX) 0.5 MG Tab 0.5 mg. (Patient not taking: Reported on 6/14/2024)      [DISCONTINUED] ASPIRIN LOW DOSE 81 MG EC tablet TAKE 1 TABLET BY MOUTH TWICE DAILY FOR 28 DAYS      [DISCONTINUED] cephALEXin (KEFLEX) 500 MG Cap take 4 capsules by mouth 1 hour to 2 hours PRIOR TO DENTAL APPOINTMENT      [DISCONTINUED] Methenamine Hippurate (HIPREX PO)  (Patient not taking:  Reported on 6/14/2024)      [DISCONTINUED] cyproheptadine (PERIACTIN) 4 MG Tab take 1 tablet by mouth twice a day for itching      [DISCONTINUED] estrogens, conjugated (PREMARIN) 0.625 MG/GM Cream insert 2 grams with APPLICATOR vaginally daily for 2 weeks then i...  (REFER TO PRESCRIPTION NOTES).      [DISCONTINUED] PREMARIN 0.625 MG/GM Cream insert 2 grams with APPLICATOR vaginally daily for 2 weeks then i...  (REFER TO PRESCRIPTION NOTES).      [DISCONTINUED] polymixin-trimethoprim (POLYTRIM) 43254-9.1 UNIT/ML-% Solution instill 1 drop INTO AFFECTED EYE(S) four times a day      [DISCONTINUED] predniSONE (DELTASONE) 20 MG Tab TAKE 3 TABLETS BY MOUTH DAILY FOR 3 days THEN TAKE 2 TABLETS BY M...  (REFER TO PRESCRIPTION NOTES).      [DISCONTINUED] triamcinolone acetonide (KENALOG) 0.1 % Cream apply to affected area twice a day for 5 days ON AND 2 DAYS OFF CYCLES if needed      [DISCONTINUED] losartan-hydrochlorothiazide (HYZAAR) 100-25 MG per tablet 1 Tabs By Mouth at Bedtime. (Patient not taking: Reported on 6/14/2024)      [DISCONTINUED] rotigotine (NEUPRO) 2 MG/24HR patch APPLY 1 PATCH TO SKIN ONCE DAILY (Patient not taking: Reported on 6/14/2024)      [DISCONTINUED] losartan (COZAAR) 100 MG Tab       [DISCONTINUED] albuterol 108 (90 Base) MCG/ACT Aero Soln inhalation aerosol inhale 2 puffs by mouth and INTO THE LUNGS every 4 to 6 hours if needed for cough or wheezing      [DISCONTINUED] Magnesium Hydroxide (DULCOLAX PO) Take 2 Tablets by mouth every day. (Patient not taking: Reported on 6/14/2024)      [DISCONTINUED] NON SPECIFIED Take 4 Tablets by mouth every day. D [place], OTC for bladder health       [DISCONTINUED] Desvenlafaxine Succinate 100 MG TABLET SR 24 HR Take 1 Tab by mouth every morning. Indications: Major Depressive Disorder (Patient not taking: Reported on 6/14/2024)       No facility-administered encounter medications on file as of 6/14/2024.     Review of Systems   Respiratory:  Negative for  "shortness of breath.    Cardiovascular:  Negative for chest pain, palpitations, orthopnea and leg swelling.   Neurological:  Negative for dizziness and loss of consciousness.   All other systems reviewed and are negative.             Objective     /66 (BP Location: Left arm, Patient Position: Sitting, BP Cuff Size: Adult)   Pulse 69   Resp 16   Ht 1.626 m (5' 4\")   Wt 64.4 kg (142 lb)   SpO2 98%   BMI 24.37 kg/m²     Physical Exam  Vitals reviewed.   Constitutional:       General: She is not in acute distress.     Appearance: She is normal weight.   Cardiovascular:      Rate and Rhythm: Normal rate and regular rhythm.      Heart sounds: No murmur heard.  Pulmonary:      Effort: Pulmonary effort is normal. No respiratory distress.      Breath sounds: Normal breath sounds. No rhonchi.   Abdominal:      General: There is no distension.      Tenderness: There is no abdominal tenderness.   Musculoskeletal:      Cervical back: Normal range of motion.      Right lower leg: No edema.      Left lower leg: No edema.   Neurological:      General: No focal deficit present.      Mental Status: She is alert.                Assessment & Plan     1. Essential hypertension  losartan-hydrochlorothiazide (HYZAAR) 100-12.5 MG per tablet    CBC WITHOUT DIFFERENTIAL    Comp Metabolic Panel      2. RICHMOND (obstructive sleep apnea)        3. S/P TAVR (transcatheter aortic valve replacement)        4. History of left heart catheterization - normal 2020 prior to TAVR            Medical Decision Making: Today's Assessment/Status/Plan:          Severe aortic stenosis s/p TAVR  -TAVR placed in 2020, echo completed in 2021 shows normal function  -No change to patient's health or symptoms can repeat an echo at 5 years    Hypertension  -Well-controlled on current regimen  -Will order annual labs    Follow-up in 1 year or if symptoms change.    Rosa M Hurtado, MSN, APRN  [facility]  [phone]    Please note this dictation was created using voice recognition software.  I have made every reasonable attempt to correct obvious errors, but there may be errors of grammar and possibly content that I did not discover before finalizing the note.

## 2024-06-14 ENCOUNTER — OFFICE VISIT (OUTPATIENT)
Dept: CARDIOLOGY | Facility: MEDICAL CENTER | Age: 79
End: 2024-06-14
Attending: NURSE PRACTITIONER
Payer: MEDICARE

## 2024-06-14 VITALS
WEIGHT: 142 LBS | DIASTOLIC BLOOD PRESSURE: 66 MMHG | BODY MASS INDEX: 24.24 KG/M2 | SYSTOLIC BLOOD PRESSURE: 120 MMHG | OXYGEN SATURATION: 98 % | RESPIRATION RATE: 16 BRPM | HEIGHT: 64 IN | HEART RATE: 69 BPM

## 2024-06-14 DIAGNOSIS — Z95.2 S/P TAVR (TRANSCATHETER AORTIC VALVE REPLACEMENT): ICD-10-CM

## 2024-06-14 DIAGNOSIS — I10 ESSENTIAL HYPERTENSION: ICD-10-CM

## 2024-06-14 DIAGNOSIS — Z98.890 HISTORY OF LEFT HEART CATHETERIZATION: Chronic | ICD-10-CM

## 2024-06-14 DIAGNOSIS — G47.33 OSA (OBSTRUCTIVE SLEEP APNEA): ICD-10-CM

## 2024-06-14 PROCEDURE — 99213 OFFICE O/P EST LOW 20 MIN: CPT | Performed by: NURSE PRACTITIONER

## 2024-06-14 PROCEDURE — 3078F DIAST BP <80 MM HG: CPT | Performed by: NURSE PRACTITIONER

## 2024-06-14 PROCEDURE — 99214 OFFICE O/P EST MOD 30 MIN: CPT | Performed by: NURSE PRACTITIONER

## 2024-06-14 PROCEDURE — 3074F SYST BP LT 130 MM HG: CPT | Performed by: NURSE PRACTITIONER

## 2024-06-14 PROCEDURE — G2211 COMPLEX E/M VISIT ADD ON: HCPCS | Performed by: NURSE PRACTITIONER

## 2024-06-14 RX ORDER — LOSARTAN POTASSIUM AND HYDROCHLOROTHIAZIDE 12.5; 1 MG/1; MG/1
1 TABLET ORAL DAILY
Qty: 90 TABLET | Refills: 3 | Status: SHIPPED | OUTPATIENT
Start: 2024-06-14

## 2024-06-14 ASSESSMENT — FIBROSIS 4 INDEX: FIB4 SCORE: 1.11

## 2024-06-16 ASSESSMENT — ENCOUNTER SYMPTOMS
SHORTNESS OF BREATH: 0
LOSS OF CONSCIOUSNESS: 0
PALPITATIONS: 0
ORTHOPNEA: 0
DIZZINESS: 0

## 2024-06-20 ASSESSMENT — FIBROSIS 4 INDEX: FIB4 SCORE: 1.11

## 2024-06-26 ENCOUNTER — TELEPHONE (OUTPATIENT)
Dept: MEDICAL GROUP | Facility: PHYSICIAN GROUP | Age: 79
End: 2024-06-26
Payer: MEDICARE

## 2024-06-26 RX ORDER — CEFDINIR 300 MG/1
CAPSULE ORAL
COMMUNITY
Start: 2024-06-21 | End: 2024-08-21

## 2024-06-26 NOTE — TELEPHONE ENCOUNTER
Left message for patient to inform her to schedule an appointment for medical clearance. Left call back number [phone]

## 2024-08-21 ENCOUNTER — OFFICE VISIT (OUTPATIENT)
Dept: MEDICAL GROUP | Facility: PHYSICIAN GROUP | Age: 79
End: 2024-08-21
Payer: MEDICARE

## 2024-08-21 VITALS
WEIGHT: 164.02 LBS | SYSTOLIC BLOOD PRESSURE: 140 MMHG | TEMPERATURE: 97.1 F | OXYGEN SATURATION: 96 % | HEART RATE: 66 BPM | DIASTOLIC BLOOD PRESSURE: 60 MMHG | RESPIRATION RATE: 14 BRPM | BODY MASS INDEX: 28 KG/M2 | HEIGHT: 64 IN

## 2024-08-21 DIAGNOSIS — Z01.818 PRE-OP EVALUATION: ICD-10-CM

## 2024-08-21 PROCEDURE — 99214 OFFICE O/P EST MOD 30 MIN: CPT | Performed by: STUDENT IN AN ORGANIZED HEALTH CARE EDUCATION/TRAINING PROGRAM

## 2024-08-21 PROCEDURE — 3078F DIAST BP <80 MM HG: CPT | Performed by: STUDENT IN AN ORGANIZED HEALTH CARE EDUCATION/TRAINING PROGRAM

## 2024-08-21 PROCEDURE — 3077F SYST BP >= 140 MM HG: CPT | Performed by: STUDENT IN AN ORGANIZED HEALTH CARE EDUCATION/TRAINING PROGRAM

## 2024-08-21 RX ORDER — METHENAMINE HIPPURATE 1000 MG/1
1 TABLET ORAL
COMMUNITY

## 2024-08-21 RX ORDER — ZINC GLUCONATE 50 MG
50 TABLET ORAL DAILY
COMMUNITY

## 2024-08-21 RX ORDER — HYDROXYZINE HYDROCHLORIDE 25 MG/1
TABLET, FILM COATED ORAL
COMMUNITY
Start: 2024-07-24 | End: 2024-08-21

## 2024-08-21 RX ORDER — ESTRADIOL 0.1 MG/G
CREAM VAGINAL
COMMUNITY
End: 2024-08-21

## 2024-08-21 RX ORDER — POLYETHYLENE GLYCOL 3350 17 G/17G
17 POWDER, FOR SOLUTION ORAL DAILY
COMMUNITY

## 2024-08-21 ASSESSMENT — PATIENT HEALTH QUESTIONNAIRE - PHQ9: CLINICAL INTERPRETATION OF PHQ2 SCORE: 0

## 2024-08-21 ASSESSMENT — FIBROSIS 4 INDEX: FIB4 SCORE: 1.11

## 2024-08-21 NOTE — ASSESSMENT & PLAN NOTE
Preop clearance for [facility] surgery  Patient's chronic medical conditions are well-controlled.  She does have a significant past medical history of a aortic valve replacement I would like her to get cardiac clearance prior to undergoing surgery.  She will also need to discontinue aspirin 1 week prior and restart 48 hours after.

## 2024-08-21 NOTE — PROGRESS NOTES
REASON FOR VISIT: Pre-Op Consultation  Consultation Requested by: Dr. Taylor orthopedics  Procedure date and type: still being scheduled   Inherent cardiac risk of procedure:   intermediate      History of condition for which surgery is planned:  R foot hammer toe surgery. Getting surgery for correction     Current chronic conditions:   S/p TAVR  RICHMOND on cpap   HTN  Depression    Past medical history:  has a past medical history of Anemia, Anesthesia, Aortic valve stenosis, severe (07/20/2020), Arrhythmia, Arthritis, Bilateral carotid artery stenosis, Breast cancer (HCC) (07/2015), Breath shortness, Cancer (HCC), Cancer (HCC) (2015), Cataract, Delayed emergence from general anesthesia, Dental disorder, Depression, Essential hypertension, GERD (gastroesophageal reflux disease), Hard to intubate, Heart murmur, History of left heart catheterization - normal 2020 prior to TAVR, Joint replacement, Nasal drainage, Obesity, Osteoporosis, Psychiatric problem, Renal disorder (06/11/2020), Restless leg syndrome, Sleep apnea, Snoring, Umbilical hernia (10/2016), Unspecified urinary incontinence, and Urinary bladder disorder (06/21/2017).    She has no past medical history of Acute nasopharyngitis, Anginal syndrome (HCC), Asthma, Blood clotting disorder (HCC), Bowel habit changes, Bronchitis, Carcinoma in situ of respiratory system, Congestive heart failure (HCC), Continuous ambulatory peritoneal dialysis status (HCC), Coughing blood, Diabetes (HCC), Dialysis patient (HCC), Disorder of thyroid, Emphysema of lung (HCC), Glaucoma, Gynecological disorder, Heart burn, Hemorrhagic disorder (HCC), Hepatitis A, Hepatitis B, Hepatitis C, Hiatus hernia syndrome, High cholesterol, Indigestion, Infectious disease, Jaundice, Myocardial infarct (HCC), Pacemaker, Pain, Pneumonia, Pregnant, Rheumatic fever, Seizure (HCC), Stroke (HCC), or Tuberculosis..     Negative for: CAD, SBE, CVA, TIA, DVT, PE, bleeding requiring transfusion,  intubation.    Surgical and anesthetic history:  has a past surgical history that includes tubal ligation (03/1984); breast biopsy (Left, 06/1985); ear middle exploration (Left, 04/1990); hysterectomy, total abdominal (05/1993); gastric bypass laparoscopic (10/10/2001); abdominoplasty (10/28/2004); other surgical procedure (4/06, 10/06); bladder suspension (12/1993); other (09/1999); hip arthroplasty total (Right, 2008); mastectomy modified radical (Left, 08/10/2015); mastectomy (Right, 08/10/2015); node biopsy sentinel (Left, 08/10/2015); breast reconstruction (Bilateral, 08/10/2015); tissue expander place/remove (Bilateral, 08/10/2015); cath placement (Right, 09/08/2015); breast implant removal (Right, 01/06/2016); other surgical procedure (Right, 07/2016); other surgical procedure (Right, 01/2016); breast reconstruction (Bilateral, 11/29/2016); latissimus flap (Right, 11/29/2016); tissue expander place/remove (Left, 11/29/2016); capsulectomy (Bilateral, 11/29/2016); other surgical procedure (09/08/2015); capsulectomy (04/13/2017); latissimus flap (Left, 06/27/2017); breast implant removal (Left, 06/27/2017); Sleeve,Rehan Vaso Thigh; tonsillectomy (1952); knee arthroscopy (Left, 1983, 12/97); knee arthroscopy (Right, 6/88, 12/91, 11/97); knee arthroplasty total (Bilateral, 01/24/2000); hand surgery (Right, 02/12/2007); rotator cuff repair (Right, 2009); lung biopsy open (Right, 06/2020); transcatheter aortic valve replacement (07/20/2020); karen (07/20/2020); other surgical procedure (5/97, 4/05/05); elizabeth by laparoscopy (09/1997); bowel resection (12/28/2016); pr open implant neurostimulator sacral (N/A, 01/26/2022); pr ins/rpl prph sac/gstr npg/r (Right, 02/08/2022); other cardiac surgery; gyn surgery; pr endoscopic injection/implant (10/28/2022); pr total hip arthroplasty (Left, 8/10/2023); and pr reconstr total shoulder implant (Right, 12/13/2023). Prior surgery without complication, bleeding, reaction to  anesthetic, prolonged recovery    Habits:   Social History     Tobacco Use    Smoking status: Never     Passive exposure: Never    Smokeless tobacco: Never   Vaping Use    Vaping status: Never Used   Substance Use Topics    Alcohol use: Yes     Alcohol/week: 0.6 oz     Types: 1 Standard drinks or equivalent per week     Comment: Maybe once a week    Drug use: No       Allergies: Ampicillin, Clindamycin, Levaquin, Penicillins, Amlodipine, Demerol, Levofloxacin, Penicillin g, and Lisinopril No known allergy to Anesthetic, or Latex.       Current medicines:   Current Outpatient Medications   Medication Sig Dispense Refill    polyethylene glycol 3350 (MIRALAX) 17 GM/SCOOP Powder Take 17 g by mouth every day.      zinc gluconate 50 MG Tab tablet Take 50 mg by mouth every day.      cephALEXin (KEFLEX) 250 MG Cap Take 1 capsule every day by oral route for 90 days.      losartan-hydrochlorothiazide (HYZAAR) 100-12.5 MG per tablet Take 1 Tablet by mouth every day. 90 Tablet 3    magnesium oxide (MAG-OX) 400 MG Tab tablet Take 400 mg by mouth every day.      aspirin 81 MG EC tablet 81 mg.      ARIPiprazole (ABILIFY) 5 MG tablet 5 mg.      Desvenlafaxine Succinate (PRISTIQ) 100 MG TABLET SR 24  mg.      Cholecalciferol (VITAMIN D3 PO) Take 4,000 Units by mouth every day.      folic acid (FOLVITE) 400 MCG tablet Take 400 mcg by mouth every day.      Coenzyme Q10 (COQ10 PO) Take 300 mg by mouth every day.      acetaminophen (TYLENOL) 500 MG Tab Take 500-1,000 mg by mouth every 6 hours as needed.      alprazolam (XANAX) 0.5 MG TABS Take 0.5 mg by mouth at bedtime as needed for Anxiety. Indications: Feeling Anxious      Probiotic Product (PROBIOTIC DAILY PO) Take 1 Cap by mouth every day.      methenamine hip (HIPPREX) 1 GM Tab Take 1 g by mouth.      NEUPRO 3 MG/24HR PATCH 24 HR  (Patient not taking: Reported on 8/21/2024)      ferrous sulfate 325 (65 Fe) MG tablet Take 325 mg by mouth every day.       No current  "facility-administered medications for this visit.       Anticoagulant: ASA              METS:Excellent   ASA Class: 3. Htn and hx of TAVR     ROS: negative for: CP, SOB, GIBBONS, Orthopnea, wheezing, leg edema, polydipsia, polyuria, fevers, chills, sweats, cough, cold, congestion, abd pain, reflux, black or bloody stools, weight loss/gain.    PHYSICAL EXAMINATION:  VITAL SIGNS: BP (!) 140/60   Pulse 66   Temp 36.2 °C (97.1 °F) (Temporal)   Resp 14   Ht 1.626 m (5' 4\")   Wt 74.4 kg (164 lb 0.4 oz)   SpO2 96%  Body mass index is 28.15 kg/m².    HEENT: EOMI, PERRL. Oropharynx pink, moist. Normal airway. Neck supple, no cervical lymphadenopathy.  LUNGS: CTAB good excursion.   HEART: RRR no murmur.  ABDOMEN: soft, nondistended, nontender normal BS. No HSM.  LOWER EXTREMITIES: warm and well perfused with no edema.      Labs: See attached/per surgeon    Problem List Items Addressed This Visit       Pre-op evaluation     Preop clearance for [facility] surgery  Patient's chronic medical conditions are well-controlled.  She does have a significant past medical history of a aortic valve replacement I would like her to get cardiac clearance prior to undergoing surgery.  She will also need to discontinue aspirin 1 week prior and restart 48 hours after.               "

## 2024-09-05 ENCOUNTER — OFFICE VISIT (OUTPATIENT)
Dept: CARDIOLOGY | Facility: MEDICAL CENTER | Age: 79
End: 2024-09-05
Payer: MEDICARE

## 2024-09-05 VITALS
BODY MASS INDEX: 26.29 KG/M2 | WEIGHT: 154 LBS | HEIGHT: 64 IN | DIASTOLIC BLOOD PRESSURE: 62 MMHG | RESPIRATION RATE: 16 BRPM | OXYGEN SATURATION: 96 % | HEART RATE: 73 BPM | SYSTOLIC BLOOD PRESSURE: 138 MMHG

## 2024-09-05 DIAGNOSIS — Z95.2 S/P TAVR (TRANSCATHETER AORTIC VALVE REPLACEMENT): ICD-10-CM

## 2024-09-05 DIAGNOSIS — I10 ESSENTIAL HYPERTENSION: Chronic | ICD-10-CM

## 2024-09-05 DIAGNOSIS — I35.0 AORTIC VALVE STENOSIS, ETIOLOGY OF CARDIAC VALVE DISEASE UNSPECIFIED: ICD-10-CM

## 2024-09-05 LAB — EKG IMPRESSION: NORMAL

## 2024-09-05 PROCEDURE — 99214 OFFICE O/P EST MOD 30 MIN: CPT

## 2024-09-05 PROCEDURE — 93010 ELECTROCARDIOGRAM REPORT: CPT | Performed by: INTERNAL MEDICINE

## 2024-09-05 PROCEDURE — 3075F SYST BP GE 130 - 139MM HG: CPT

## 2024-09-05 PROCEDURE — 99213 OFFICE O/P EST LOW 20 MIN: CPT

## 2024-09-05 PROCEDURE — 93005 ELECTROCARDIOGRAM TRACING: CPT

## 2024-09-05 PROCEDURE — 3078F DIAST BP <80 MM HG: CPT

## 2024-09-05 ASSESSMENT — ENCOUNTER SYMPTOMS
HEADACHES: 0
LIGHT-HEADEDNESS: 0
DIZZINESS: 0
ORTHOPNEA: 0
PALPITATIONS: 0
SHORTNESS OF BREATH: 0
PND: 0
SYNCOPE: 0
NEAR-SYNCOPE: 0
DYSPNEA ON EXERTION: 0

## 2024-09-05 ASSESSMENT — FIBROSIS 4 INDEX: FIB4 SCORE: 1.11

## 2024-09-05 NOTE — PROGRESS NOTES
"Chief Complaint   Patient presents with    Hypertension          Subjective:   Tiffany Louis is a 79 y.o. female who presents today for follow-up.     Patient of Dr. Alfaro.  Current medical problems include HTN, aortic stenosis s/p TAVR July 2020. Their last clinic visit was 4/25/2023 with Dr. Alfaro.    Today's visit:  Patient reports doing overall well from a cardiac perspective. She denies chest pain, palpitations, shortness of breath, orthopnea, PND or syncope. She does report increased BLE swelling the last two weeks but has increased her oral salt intake due to chronic hyponatremia and needing to improve sodium prior to surgery. Reports swelling improves with elevation. She is having right toe surgery at the [facility] by Dr. Taylor and needs surgical clearance. She does not currently take her blood pressure at home but denies headache, dizziness/lightheadedness or vision changes. She is able to walk up and down a flight of stairs in her home without stopping or exertional dyspnea.     Cardiovascular Risk Factors:  1. Smoking status: Never  2. Type II Diabetes Mellitus: No No results found for: \"HBA1C\" Denies  3. Hypertension: Yes  4. Dyslipidemia: No No results found for: \"CHOLSTRLTOT\", \"LDL\", \"HDL\", \"TRIGLYCERIDE\" Denies  5. Family history of early Coronary Artery Disease in a first degree relative (Male less than 55 years of age; Female less than 65 years of age): No  6.  Obesity and/or Metabolic Syndrome: No  7. Sedentary lifestyle: No    Past Medical History:   Diagnosis Date    Anemia     Anesthesia     \"very low blood pressure\" Low oxygen    Aortic valve stenosis, severe 07/20/2020    Aortic valve replacement    Arrhythmia     Arthritis     osteo, \"all over\"    Bilateral carotid artery stenosis     Breast cancer (HCC) 07/2015    left    Breath shortness     from aortic stenosis; much improved since aortic valve replacement    Cancer (HCC)     skin    Cancer (HCC) 2015    breast, Left    Cataract     " "Bilateral IOL    Delayed emergence from general anesthesia     Dental disorder     upper implants front teeth    Depression     Essential hypertension     GERD (gastroesophageal reflux disease)     Hard to intubate     Heart murmur     History of left heart catheterization - normal 2020 prior to TAVR     Joint replacement     bilateral knees and right hip    Nasal drainage     Obesity     Osteoporosis     Psychiatric problem     anxiety/depression    Renal disorder 06/11/2020    kidney stone    Restless leg syndrome     Sleep apnea     CPAP    Snoring     Umbilical hernia 10/2016    Unspecified urinary incontinence     Urinary bladder disorder 06/21/2017    reports freq infections but none in the past year; on ABX, + bacteria in \"gut\"; last UTI 10/2021         Family History   Problem Relation Age of Onset    Kidney Disease Mother     Cancer Mother     Breast Cancer Mother     Heart Failure Father     Hypertension Father          Social History     Tobacco Use    Smoking status: Never     Passive exposure: Never    Smokeless tobacco: Never   Vaping Use    Vaping status: Never Used   Substance Use Topics    Alcohol use: Yes     Alcohol/week: 0.6 oz     Types: 1 Standard drinks or equivalent per week     Comment: Maybe once a week    Drug use: No         Allergies   Allergen Reactions    Ampicillin Shortness of Breath, Swelling and Rash     Tolerated Cefazolin on 08/10/15    Clindamycin Shortness of Breath, Rash and Unspecified     .    Levaquin Shortness of Breath    Penicillins Shortness of Breath, Rash and Unspecified     Tolerated Ancef 08/10/15  Other reaction(s): Review Dt: 02/25/2019    Amlodipine      Swelling, lower extremitites    Demerol Itching, Unspecified and Rash     Itching and rash      Levofloxacin Unspecified     Other reaction(s): rash    Penicillin G Rash    Lisinopril Cough and Rash         Current Outpatient Medications   Medication Sig    methenamine hip (HIPPREX) 1 GM Tab Take 1 g by mouth.    " "polyethylene glycol 3350 (MIRALAX) 17 GM/SCOOP Powder Take 17 g by mouth every day.    zinc gluconate 50 MG Tab tablet Take 50 mg by mouth every day.    cephALEXin (KEFLEX) 250 MG Cap Take 1 capsule every day by oral route for 90 days.    losartan-hydrochlorothiazide (HYZAAR) 100-12.5 MG per tablet Take 1 Tablet by mouth every day.    magnesium oxide (MAG-OX) 400 MG Tab tablet Take 400 mg by mouth every day.    aspirin 81 MG EC tablet 81 mg.    ARIPiprazole (ABILIFY) 5 MG tablet 5 mg.    Desvenlafaxine Succinate (PRISTIQ) 100 MG TABLET SR 24  mg.    Cholecalciferol (VITAMIN D3 PO) Take 4,000 Units by mouth every day.    ferrous sulfate 325 (65 Fe) MG tablet Take 325 mg by mouth every day.    folic acid (FOLVITE) 400 MCG tablet Take 400 mcg by mouth every day.    Coenzyme Q10 (COQ10 PO) Take 300 mg by mouth every day.    acetaminophen (TYLENOL) 500 MG Tab Take 500-1,000 mg by mouth every 6 hours as needed.    alprazolam (XANAX) 0.5 MG TABS Take 0.5 mg by mouth at bedtime as needed for Anxiety. Indications: Feeling Anxious    Probiotic Product (PROBIOTIC DAILY PO) Take 1 Cap by mouth every day.    NEUPRO 3 MG/24HR PATCH 24 HR  (Patient not taking: Reported on 8/21/2024)         Review of Systems   Constitutional: Negative for malaise/fatigue.   Cardiovascular:  Positive for leg swelling. Negative for chest pain, dyspnea on exertion, near-syncope, orthopnea, palpitations, paroxysmal nocturnal dyspnea and syncope.   Respiratory:  Negative for shortness of breath.    Neurological:  Negative for dizziness, headaches and light-headedness.           Objective:   /62 (BP Location: Left arm, Patient Position: Sitting, BP Cuff Size: Adult)   Pulse 73   Resp 16   Ht 1.626 m (5' 4\")   Wt 69.9 kg (154 lb)   SpO2 96%  Body mass index is 26.43 kg/m².         Physical Exam  Vitals reviewed.   Constitutional:       General: She is not in acute distress.     Appearance: Normal appearance.   HENT:      Head: " Normocephalic and atraumatic.   Cardiovascular:      Rate and Rhythm: Normal rate and regular rhythm.      Pulses: Normal pulses.      Heart sounds: Murmur heard.   Pulmonary:      Effort: Pulmonary effort is normal. No respiratory distress.      Breath sounds: Normal breath sounds.   Musculoskeletal:      Right lower leg: Edema present.      Left lower leg: Edema present.   Neurological:      Mental Status: She is alert and oriented to person, place, and time.      Gait: Gait normal.   Psychiatric:         Behavior: Behavior normal.             Lab Results   Component Value Date/Time    SODIUM 134 (L) 12/07/2023 11:23 AM    SODIUM 127 (L) 10/28/2022 10:29 AM    POTASSIUM 4.2 12/07/2023 11:23 AM    POTASSIUM 3.4 (L) 10/28/2022 10:29 AM    CHLORIDE 100 12/07/2023 11:23 AM    CHLORIDE 88 (L) 10/28/2022 10:29 AM    CO2 28.0 12/07/2023 11:23 AM    CO2 25 10/28/2022 10:29 AM    GLUCOSE 102 12/07/2023 11:23 AM    GLUCOSE 107 (H) 10/28/2022 10:29 AM    BUN 17.0 12/07/2023 11:23 AM    BUN 14 10/28/2022 10:29 AM    CREATININE 0.80 12/07/2023 11:23 AM    CREATININE 0.55 10/28/2022 10:29 AM    CREATININE 1.1 03/17/2008 03:05 PM    BUNCREATRAT 21.3 (H) 12/07/2023 11:23 AM      Lab Results   Component Value Date/Time    WBC 5.70 12/07/2023 11:23 AM    WBC 6.0 01/19/2022 11:14 AM    RBC 4.42 12/07/2023 11:23 AM    RBC 4.68 01/19/2022 11:14 AM    HEMOGLOBIN 12.8 12/07/2023 11:23 AM    HEMOGLOBIN 13.6 01/19/2022 11:14 AM    HEMATOCRIT 38.2 12/07/2023 11:23 AM    HEMATOCRIT 41.2 01/19/2022 11:14 AM    MCV 86.4 12/07/2023 11:23 AM    MCV 88.0 01/19/2022 11:14 AM    MCH 29.0 12/07/2023 11:23 AM    MCH 29.1 01/19/2022 11:14 AM    MCHC 33.6 12/07/2023 11:23 AM    MCHC 33.0 (L) 01/19/2022 11:14 AM    MPV 6.2 (L) 12/07/2023 11:23 AM    MPV 8.5 (L) 01/19/2022 11:14 AM    NEUTSPOLYS 63.9 12/07/2023 11:23 AM    NEUTSPOLYS 66.00 01/19/2022 11:14 AM    LYMPHOCYTES 25.3 12/07/2023 11:23 AM    LYMPHOCYTES 21.20 (L) 01/19/2022 11:14 AM     "MONOCYTES 7.5 12/07/2023 11:23 AM    MONOCYTES 8.30 01/19/2022 11:14 AM    EOSINOPHILS 2.5 12/07/2023 11:23 AM    EOSINOPHILS 3.30 01/19/2022 11:14 AM    BASOPHILS 0.8 12/07/2023 11:23 AM    BASOPHILS 0.50 01/19/2022 11:14 AM    HYPOCHROMIA 1+ 03/31/2005 12:00 PM    ANISOCYTOSIS 1+ 01/06/2016 02:55 PM      No results found for: \"CHOLSTRLTOT\", \"LDL\", \"HDL\", \"TRIGLYCERIDE\"    No results found for: \"TROPONINT\"  Lab Results   Component Value Date/Time    NTPROBNP 659 (H) 07/21/2020 0507     Assessment:   1. Aortic valve stenosis, etiology of cardiac valve disease unspecified    2. S/P TAVR (transcatheter aortic valve replacement)  - EKG - Clinic Performed  - EC-ECHOCARDIOGRAM COMPLETE W/O CONT; Future    3. Essential hypertension        Medical Decision Making:  Today's Assessment / Plan:   Hypertension  - Good control  - continue losartan-hydrochlorothiazide 100-12.5 mg daily  - goal < 130/80  -Patient with chronic hyponatremia. Discussed hydrochlorothiazide as a contributing factor but has been on the medication for a long time and does not want to change medications.     Aortic valve stenosis s/p TAVR July 2020  -Repeat echocardiogram ordered for surveillance  -Not reporting increase symptoms    Surgical Clearance  Patient is low risk of cardiovascular complications for low to moderate risk surgery or procedures.  Tiffany is medically optimized based on my last in person assessment. Repeat echocardiogram ordered for TAVR surveillance and if symptoms have not changed pending results, surgery should proceed without further cardiac work-up.      Return in about 1 year (around 9/5/2025) for Dr. Alfaro.  Sooner if problems.    FRED Chaves   "

## 2024-09-06 ENCOUNTER — TELEPHONE (OUTPATIENT)
Dept: CARDIOLOGY | Facility: MEDICAL CENTER | Age: 79
End: 2024-09-06
Payer: MEDICARE

## 2024-09-06 NOTE — TELEPHONE ENCOUNTER
Echo order was faxed to [facility]. Facility was asked to get in touch with patient once order was received.    *Confirmation fax sent to scan*

## 2024-09-06 NOTE — TELEPHONE ENCOUNTER
Caller: Tiffany Louis     Topic/issue: Patient states her Echo has not been received at [facility] in [place].  Fax number: [phone]    Patient would like this order expedited.    Callback Number: [phone]      Thank you  Mary SORENSEN

## 2024-09-20 ENCOUNTER — TELEPHONE (OUTPATIENT)
Dept: CARDIOLOGY | Facility: MEDICAL CENTER | Age: 79
End: 2024-09-20
Payer: MEDICARE

## 2024-09-20 NOTE — TELEPHONE ENCOUNTER
"Last OV: 9/5/2024  Proposed Surgery: Osteotomy, Metatarsal Bone, Proximal - Right   Surgery Date: TBD  Requesting Office Name: [facility]  Fax Number: [phone]  Preference of Location (default is surgery center unless specified by Cardiologist or [facility])  Prior Clearance Addressed: Yes     Surgical Clearance  Patient is low risk of cardiovascular complications for low to moderate risk surgery or procedures.  Tiffany is medically optimized based on my last in person assessment. Repeat echocardiogram ordered for TAVR surveillance and if symptoms have not changed pending results, surgery should proceed without further cardiac work-up.      Anticoags/Antiplatelets: Aspirin  Anticoags/Antiplatelet managed by Cardiology? YES    Outstanding Cardiac Imaging : Yes  ECHO  Stent, Cardiac Devices, or Catheterization: No  Ablation, TAVR/Valve (including open heart), Cardioversion: Yes (TAVR- Implanted 7/20/2020 Expires: 2/26/2022)  Recent Cardiac Hospitalization: No            When: N/A  History (cardiac history):   Past Medical History:   Diagnosis Date    Anemia     Anesthesia     \"very low blood pressure\" Low oxygen    Aortic valve stenosis, severe 07/20/2020    Aortic valve replacement    Arrhythmia     Arthritis     osteo, \"all over\"    Bilateral carotid artery stenosis     Breast cancer (HCC) 07/2015    left    Breath shortness     from aortic stenosis; much improved since aortic valve replacement    Cancer (HCC)     skin    Cancer (HCC) 2015    breast, Left    Cataract     Bilateral IOL    Delayed emergence from general anesthesia     Dental disorder     upper implants front teeth    Depression     Essential hypertension     GERD (gastroesophageal reflux disease)     Hard to intubate     Heart murmur     History of left heart catheterization - normal 2020 prior to TAVR     Joint replacement     bilateral knees and right hip    Nasal drainage     Obesity     Osteoporosis     Psychiatric problem     anxiety/depression    Renal " "disorder 06/11/2020    kidney stone    Restless leg syndrome     Sleep apnea     CPAP    Snoring     Umbilical hernia 10/2016    Unspecified urinary incontinence     Urinary bladder disorder 06/21/2017    reports freq infections but none in the past year; on ABX, + bacteria in \"gut\"; last UTI 10/2021             Surgical Clearance Letter Sent: YES (Per provider)  **Scan clearance request letter into [facility].**    "

## 2024-09-20 NOTE — LETTER
"PROCEDURE/SURGERY CLEARANCE FORM      Encounter Date: 9/20/2024    Patient: Tiffany Louis  YOB: 1945    CARDIOLOGIST:  FE Chaves.    REFERRING DOCTOR:  No ref. provider found      The procedure/surgery: Osteotomy, Metatarsal Bone, Proximal - Right                                            Additional comments:     \"Surgical Clearance  Patient is low risk of cardiovascular complications for low to moderate risk surgery or procedures.  Tiffany is medically optimized based on my last in person assessment. Repeat echocardiogram ordered for TAVR surveillance and if symptoms have not changed pending results, surgery should proceed without further cardiac work-up. \"                     Electronically Signed         MD Signature   FE Chaves.  "

## 2024-09-24 ENCOUNTER — APPOINTMENT (OUTPATIENT)
Dept: ADMISSIONS | Facility: MEDICAL CENTER | Age: 79
End: 2024-09-24
Attending: ORTHOPAEDIC SURGERY
Payer: MEDICARE

## 2024-09-26 ENCOUNTER — APPOINTMENT (OUTPATIENT)
Dept: ADMISSIONS | Facility: MEDICAL CENTER | Age: 79
End: 2024-09-26
Attending: ORTHOPAEDIC SURGERY
Payer: MEDICARE

## 2024-10-01 ENCOUNTER — PRE-ADMISSION TESTING (OUTPATIENT)
Dept: ADMISSIONS | Facility: MEDICAL CENTER | Age: 79
End: 2024-10-01
Payer: MEDICARE

## 2024-10-14 ENCOUNTER — ANESTHESIA (OUTPATIENT)
Dept: SURGERY | Facility: MEDICAL CENTER | Age: 79
End: 2024-10-14
Payer: MEDICARE

## 2024-10-14 ENCOUNTER — APPOINTMENT (OUTPATIENT)
Dept: RADIOLOGY | Facility: MEDICAL CENTER | Age: 79
End: 2024-10-14
Attending: ORTHOPAEDIC SURGERY
Payer: MEDICARE

## 2024-10-14 ENCOUNTER — HOSPITAL ENCOUNTER (OUTPATIENT)
Facility: MEDICAL CENTER | Age: 79
End: 2024-10-14
Attending: ORTHOPAEDIC SURGERY | Admitting: ORTHOPAEDIC SURGERY
Payer: MEDICARE

## 2024-10-14 ENCOUNTER — ANESTHESIA EVENT (OUTPATIENT)
Dept: SURGERY | Facility: MEDICAL CENTER | Age: 79
End: 2024-10-14
Payer: MEDICARE

## 2024-10-14 VITALS
WEIGHT: 156.75 LBS | TEMPERATURE: 97.3 F | DIASTOLIC BLOOD PRESSURE: 73 MMHG | BODY MASS INDEX: 26.76 KG/M2 | HEIGHT: 64 IN | HEART RATE: 64 BPM | RESPIRATION RATE: 16 BRPM | SYSTOLIC BLOOD PRESSURE: 157 MMHG | OXYGEN SATURATION: 92 %

## 2024-10-14 PROCEDURE — 160048 HCHG OR STATISTICAL LEVEL 1-5: Performed by: ORTHOPAEDIC SURGERY

## 2024-10-14 PROCEDURE — C1713 ANCHOR/SCREW BN/BN,TIS/BN: HCPCS | Performed by: ORTHOPAEDIC SURGERY

## 2024-10-14 PROCEDURE — 160041 HCHG SURGERY MINUTES - EA ADDL 1 MIN LEVEL 4: Performed by: ORTHOPAEDIC SURGERY

## 2024-10-14 PROCEDURE — 160002 HCHG RECOVERY MINUTES (STAT): Performed by: ORTHOPAEDIC SURGERY

## 2024-10-14 PROCEDURE — 160009 HCHG ANES TIME/MIN: Performed by: ORTHOPAEDIC SURGERY

## 2024-10-14 PROCEDURE — 700101 HCHG RX REV CODE 250: Performed by: STUDENT IN AN ORGANIZED HEALTH CARE EDUCATION/TRAINING PROGRAM

## 2024-10-14 PROCEDURE — 28010 INCISION OF TOE TENDON: CPT | Mod: 59,T8 | Performed by: ORTHOPAEDIC SURGERY

## 2024-10-14 PROCEDURE — 28309 INCISION OF METATARSALS: CPT | Mod: RT | Performed by: ORTHOPAEDIC SURGERY

## 2024-10-14 PROCEDURE — 700101 HCHG RX REV CODE 250: Performed by: ORTHOPAEDIC SURGERY

## 2024-10-14 PROCEDURE — 160036 HCHG PACU - EA ADDL 30 MINS PHASE I: Performed by: ORTHOPAEDIC SURGERY

## 2024-10-14 PROCEDURE — 8968 PR NO CHARGE - PROCEDURE: Mod: 59,RT | Performed by: FAMILY MEDICINE

## 2024-10-14 PROCEDURE — 160025 RECOVERY II MINUTES (STATS): Performed by: ORTHOPAEDIC SURGERY

## 2024-10-14 PROCEDURE — 28309 INCISION OF METATARSALS: CPT | Mod: 80ROC,RT | Performed by: FAMILY MEDICINE

## 2024-10-14 PROCEDURE — E0114 CRUTCH UNDERARM PAIR NO WOOD: HCPCS | Performed by: ORTHOPAEDIC SURGERY

## 2024-10-14 PROCEDURE — 160029 HCHG SURGERY MINUTES - 1ST 30 MINS LEVEL 4: Performed by: ORTHOPAEDIC SURGERY

## 2024-10-14 PROCEDURE — 700111 HCHG RX REV CODE 636 W/ 250 OVERRIDE (IP): Mod: JG | Performed by: ORTHOPAEDIC SURGERY

## 2024-10-14 PROCEDURE — 160046 HCHG PACU - 1ST 60 MINS PHASE II: Performed by: ORTHOPAEDIC SURGERY

## 2024-10-14 PROCEDURE — 502000 HCHG MISC OR IMPLANTS RC 0278: Performed by: ORTHOPAEDIC SURGERY

## 2024-10-14 PROCEDURE — 28313 REPAIR DEFORMITY OF TOE: CPT | Mod: 59,RT | Performed by: ORTHOPAEDIC SURGERY

## 2024-10-14 PROCEDURE — 700105 HCHG RX REV CODE 258: Performed by: ORTHOPAEDIC SURGERY

## 2024-10-14 PROCEDURE — 700111 HCHG RX REV CODE 636 W/ 250 OVERRIDE (IP): Performed by: STUDENT IN AN ORGANIZED HEALTH CARE EDUCATION/TRAINING PROGRAM

## 2024-10-14 PROCEDURE — 28285 REPAIR OF HAMMERTOE: CPT | Mod: RT | Performed by: ORTHOPAEDIC SURGERY

## 2024-10-14 PROCEDURE — 160035 HCHG PACU - 1ST 60 MINS PHASE I: Performed by: ORTHOPAEDIC SURGERY

## 2024-10-14 DEVICE — WIRE K- SMTH .062 4 - (6TX6=36): Type: IMPLANTABLE DEVICE | Site: FOOT | Status: FUNCTIONAL

## 2024-10-14 DEVICE — IMPLANTABLE DEVICE: Type: IMPLANTABLE DEVICE | Site: FOOT | Status: FUNCTIONAL

## 2024-10-14 RX ORDER — ACETAMINOPHEN 500 MG
1000 TABLET ORAL ONCE
Status: DISCONTINUED | OUTPATIENT
Start: 2024-10-14 | End: 2024-10-14 | Stop reason: HOSPADM

## 2024-10-14 RX ORDER — KETOROLAC TROMETHAMINE 15 MG/ML
INJECTION, SOLUTION INTRAMUSCULAR; INTRAVENOUS PRN
Status: DISCONTINUED | OUTPATIENT
Start: 2024-10-14 | End: 2024-10-14 | Stop reason: SURG

## 2024-10-14 RX ORDER — BUPIVACAINE HYDROCHLORIDE 5 MG/ML
INJECTION, SOLUTION EPIDURAL; INTRACAUDAL
Status: DISCONTINUED | OUTPATIENT
Start: 2024-10-14 | End: 2024-10-14 | Stop reason: HOSPADM

## 2024-10-14 RX ORDER — OXYCODONE HCL 5 MG/5 ML
5 SOLUTION, ORAL ORAL
Status: DISCONTINUED | OUTPATIENT
Start: 2024-10-14 | End: 2024-10-14 | Stop reason: HOSPADM

## 2024-10-14 RX ORDER — OXYCODONE HCL 5 MG/5 ML
10 SOLUTION, ORAL ORAL
Status: DISCONTINUED | OUTPATIENT
Start: 2024-10-14 | End: 2024-10-14 | Stop reason: HOSPADM

## 2024-10-14 RX ORDER — HYDROMORPHONE HYDROCHLORIDE 1 MG/ML
0.2 INJECTION, SOLUTION INTRAMUSCULAR; INTRAVENOUS; SUBCUTANEOUS
Status: DISCONTINUED | OUTPATIENT
Start: 2024-10-14 | End: 2024-10-14 | Stop reason: HOSPADM

## 2024-10-14 RX ORDER — MAGNESIUM HYDROXIDE 1200 MG/15ML
LIQUID ORAL
Status: COMPLETED | OUTPATIENT
Start: 2024-10-14 | End: 2024-10-14

## 2024-10-14 RX ORDER — DEXAMETHASONE SODIUM PHOSPHATE 4 MG/ML
INJECTION, SOLUTION INTRA-ARTICULAR; INTRALESIONAL; INTRAMUSCULAR; INTRAVENOUS; SOFT TISSUE PRN
Status: DISCONTINUED | OUTPATIENT
Start: 2024-10-14 | End: 2024-10-14 | Stop reason: SURG

## 2024-10-14 RX ORDER — ALBUTEROL SULFATE 5 MG/ML
2.5 SOLUTION RESPIRATORY (INHALATION)
Status: DISCONTINUED | OUTPATIENT
Start: 2024-10-14 | End: 2024-10-14 | Stop reason: HOSPADM

## 2024-10-14 RX ORDER — HALOPERIDOL 5 MG/ML
1 INJECTION INTRAMUSCULAR
Status: DISCONTINUED | OUTPATIENT
Start: 2024-10-14 | End: 2024-10-14 | Stop reason: HOSPADM

## 2024-10-14 RX ORDER — ONDANSETRON 2 MG/ML
INJECTION INTRAMUSCULAR; INTRAVENOUS PRN
Status: DISCONTINUED | OUTPATIENT
Start: 2024-10-14 | End: 2024-10-14 | Stop reason: SURG

## 2024-10-14 RX ORDER — DIPHENHYDRAMINE HYDROCHLORIDE 50 MG/ML
12.5 INJECTION INTRAMUSCULAR; INTRAVENOUS
Status: DISCONTINUED | OUTPATIENT
Start: 2024-10-14 | End: 2024-10-14 | Stop reason: HOSPADM

## 2024-10-14 RX ORDER — ONDANSETRON 2 MG/ML
4 INJECTION INTRAMUSCULAR; INTRAVENOUS
Status: DISCONTINUED | OUTPATIENT
Start: 2024-10-14 | End: 2024-10-14 | Stop reason: HOSPADM

## 2024-10-14 RX ORDER — SODIUM CHLORIDE, SODIUM LACTATE, POTASSIUM CHLORIDE, CALCIUM CHLORIDE 600; 310; 30; 20 MG/100ML; MG/100ML; MG/100ML; MG/100ML
INJECTION, SOLUTION INTRAVENOUS CONTINUOUS
Status: ACTIVE | OUTPATIENT
Start: 2024-10-14 | End: 2024-10-14

## 2024-10-14 RX ORDER — LIDOCAINE HYDROCHLORIDE 20 MG/ML
INJECTION, SOLUTION EPIDURAL; INFILTRATION; INTRACAUDAL; PERINEURAL PRN
Status: DISCONTINUED | OUTPATIENT
Start: 2024-10-14 | End: 2024-10-14 | Stop reason: SURG

## 2024-10-14 RX ORDER — EPHEDRINE SULFATE 50 MG/ML
5 INJECTION, SOLUTION INTRAVENOUS
Status: DISCONTINUED | OUTPATIENT
Start: 2024-10-14 | End: 2024-10-14 | Stop reason: HOSPADM

## 2024-10-14 RX ORDER — HYDROMORPHONE HYDROCHLORIDE 1 MG/ML
0.4 INJECTION, SOLUTION INTRAMUSCULAR; INTRAVENOUS; SUBCUTANEOUS
Status: DISCONTINUED | OUTPATIENT
Start: 2024-10-14 | End: 2024-10-14 | Stop reason: HOSPADM

## 2024-10-14 RX ORDER — LABETALOL HYDROCHLORIDE 5 MG/ML
5 INJECTION, SOLUTION INTRAVENOUS
Status: DISCONTINUED | OUTPATIENT
Start: 2024-10-14 | End: 2024-10-14 | Stop reason: HOSPADM

## 2024-10-14 RX ORDER — HYDRALAZINE HYDROCHLORIDE 20 MG/ML
5 INJECTION INTRAMUSCULAR; INTRAVENOUS
Status: DISCONTINUED | OUTPATIENT
Start: 2024-10-14 | End: 2024-10-14 | Stop reason: HOSPADM

## 2024-10-14 RX ORDER — CEFAZOLIN SODIUM 1 G/3ML
INJECTION, POWDER, FOR SOLUTION INTRAMUSCULAR; INTRAVENOUS PRN
Status: DISCONTINUED | OUTPATIENT
Start: 2024-10-14 | End: 2024-10-14 | Stop reason: SURG

## 2024-10-14 RX ADMIN — SODIUM CHLORIDE, POTASSIUM CHLORIDE, SODIUM LACTATE AND CALCIUM CHLORIDE: 600; 310; 30; 20 INJECTION, SOLUTION INTRAVENOUS at 10:50

## 2024-10-14 RX ADMIN — PROPOFOL 100 MG: 10 INJECTION, EMULSION INTRAVENOUS at 10:56

## 2024-10-14 RX ADMIN — ROCURONIUM BROMIDE 30 MG: 10 INJECTION, SOLUTION INTRAVENOUS at 10:58

## 2024-10-14 RX ADMIN — CEFAZOLIN 2 G: 1 INJECTION, POWDER, FOR SOLUTION INTRAMUSCULAR; INTRAVENOUS at 10:59

## 2024-10-14 RX ADMIN — KETOROLAC TROMETHAMINE 15 MG: 15 INJECTION, SOLUTION INTRAMUSCULAR; INTRAVENOUS at 11:44

## 2024-10-14 RX ADMIN — FENTANYL CITRATE 50 MCG: 50 INJECTION, SOLUTION INTRAMUSCULAR; INTRAVENOUS at 10:50

## 2024-10-14 RX ADMIN — PROPOFOL 50 MG: 10 INJECTION, EMULSION INTRAVENOUS at 10:58

## 2024-10-14 RX ADMIN — LIDOCAINE HYDROCHLORIDE 60 MG: 20 INJECTION, SOLUTION EPIDURAL; INFILTRATION; INTRACAUDAL at 10:56

## 2024-10-14 RX ADMIN — ONDANSETRON 4 MG: 2 INJECTION INTRAMUSCULAR; INTRAVENOUS at 11:41

## 2024-10-14 RX ADMIN — DEXAMETHASONE SODIUM PHOSPHATE 8 MG: 4 INJECTION INTRA-ARTICULAR; INTRALESIONAL; INTRAMUSCULAR; INTRAVENOUS; SOFT TISSUE at 10:59

## 2024-10-14 RX ADMIN — SUGAMMADEX 200 MG: 100 INJECTION, SOLUTION INTRAVENOUS at 11:44

## 2024-10-14 RX ADMIN — FENTANYL CITRATE 50 MCG: 50 INJECTION, SOLUTION INTRAMUSCULAR; INTRAVENOUS at 10:58

## 2024-10-14 ASSESSMENT — PAIN DESCRIPTION - PAIN TYPE: TYPE: SURGICAL PAIN

## 2024-10-14 ASSESSMENT — FIBROSIS 4 INDEX: FIB4 SCORE: 1.11

## 2024-10-14 ASSESSMENT — PAIN SCALES - GENERAL: PAIN_LEVEL: 1

## 2025-01-09 ENCOUNTER — APPOINTMENT (OUTPATIENT)
Dept: PHYSICAL THERAPY | Facility: REHABILITATION | Age: 80
End: 2025-01-09
Attending: INTERNAL MEDICINE
Payer: MEDICARE

## 2025-01-14 ENCOUNTER — APPOINTMENT (OUTPATIENT)
Dept: PHYSICAL THERAPY | Facility: REHABILITATION | Age: 80
End: 2025-01-14
Attending: INTERNAL MEDICINE
Payer: MEDICARE

## 2025-01-16 ENCOUNTER — APPOINTMENT (OUTPATIENT)
Dept: PHYSICAL THERAPY | Facility: REHABILITATION | Age: 80
End: 2025-01-16
Attending: INTERNAL MEDICINE
Payer: MEDICARE

## 2025-01-21 ENCOUNTER — PHYSICAL THERAPY (OUTPATIENT)
Dept: PHYSICAL THERAPY | Facility: REHABILITATION | Age: 80
End: 2025-01-21
Attending: INTERNAL MEDICINE
Payer: MEDICARE

## 2025-01-21 DIAGNOSIS — I89.0 LYMPHEDEMA: ICD-10-CM

## 2025-01-21 PROCEDURE — 97163 PT EVAL HIGH COMPLEX 45 MIN: CPT

## 2025-01-21 NOTE — OP THERAPY EVALUATION
"  Outpatient Physical Therapy  LYMPHEDEMA THERAPY INITIAL EVALUATION    [facility] Physical Therapy [address]  [address]  [place] [address]  Phone:  [phone]  Fax:  [phone]    Date of Evaluation: 01/21/2025    Patient: Tiffany Louis  YOB: 1945  MRN: [id number]     Referring Provider: Shannon Fuentes M.D.  [address]  [place] [address]   Referring Diagnosis Lymphedema, not elsewhere classified [I89.0]     Time Calculation    Start time: 1416  Stop time: 1500 Time Calculation (min): 44 minutes             Chief Complaint: Lymphedema Breast Cancer    Visit Diagnoses     ICD-10-CM   1. Lymphedema  I89.0       Subjective:   History of Present Illness:     Mechanism of injury:  Per chart: \"stage III pathologic TN2A ER/LA negative, HER-2 positive, grade III left breast cancer status post bilateral mastectomies and TCH chemotherapy.\" 5 nodes.     Pt reports it will have been ten years in August since her treatment/diagnosis. She reports she has a bit of pain to her L breast implant maybe for the last three to four years. Unsure if the pain has a pattern. She reports being quite laid back person but would like to lose 10lbs. Arm ROM seems to be not too bad. Hard to tell if her implant is swollen or not. She subconsciously rubs her L breast likely.         Past Medical History:   Diagnosis Date    Anemia     Anesthesia     \"very low blood pressure\" Low oxygen    Aortic valve stenosis, severe 07/20/2020    Aortic valve replacement    Arrhythmia     Arthritis     osteo, \"all over\"    Bilateral carotid artery stenosis     Breast cancer (HCC) 07/2015    left    Breath shortness     from aortic stenosis; much improved since aortic valve replacement    Cancer (HCC)     skin    Cancer (HCC) 2015    breast, Left    Cataract     Bilateral IOL    Delayed emergence from general anesthesia     Dental disorder     upper implants front teeth    Depression     Essential " "hypertension     GERD (gastroesophageal reflux disease)     Hard to intubate     10/1/2024 pt denies    Heart disease     Heart murmur     History of left heart catheterization - normal 2020 prior to TAVR     Hypertension     medicated    Joint replacement     bilateral knees and right hip    Nasal drainage     Obesity     Osteoporosis     Psychiatric problem     anxiety/depression    Renal disorder 06/11/2020    kidney stone    Restless leg syndrome     Sleep apnea     CPAP    Snoring     sleep study done    Umbilical hernia 10/2016    Unspecified urinary incontinence     Urinary bladder disorder 06/21/2017    reports freq infections but none in the past year; on ABX, + bacteria in \"gut\"; last UTI 10/2021     Past Surgical History:   Procedure Laterality Date    PROXIMAL METATARSAL OSTEOTOMY Right 10/14/2024    Procedure: RIGHT DISTAL METATARSAL OSTEOTOMY;  Surgeon: Kameron Taylor M.D.;  Location: SUR[facility];  Service: Orthopedics    RECONSTRUCTION, TOE Right 10/14/2024    Procedure: METATARSALPHALANGEAL JOINT RECONSTRUCTION 2/3;  Surgeon: Kameron Taylor M.D.;  Location: [facility];  Service: Orthopedics    HAMMERTOE CORRECTION Right 10/14/2024    Procedure: 2/3 HAMMERTOE CORRECTION;  Surgeon: Kameron Taylor M.D.;  Location: SURGERY [facility];  Service: Orthopedics    TENOTOMY,FOOT Right 10/14/2024    Procedure: 4TH FLEXOR TENDON RELEASE;  Surgeon: Kameron Taylor M.D.;  Location: SUR[facility]ERY [facility];  Service: Orthopedics    PB RECONSTR TOTAL SHOULDER IMPLANT Right 12/13/2023    Procedure: RIGHT REVERSE TOTAL SHOULDER ARTHROPLASTY;  Surgeon: Alexy Reddy M.D.;  Location: [facility];  Service: Orthopedics    LA TOTAL HIP ARTHROPLASTY Left 08/10/2023    Procedure: LEFT ANTERIOR TOTAL HIP ARTHROPLASTY;  Surgeon: Mukesh Macias M.D.;  Location: [facility];  Service: Orthopedics    LA ENDOSCOPIC INJECTION/IMPLANT  10/28/2022    Procedure: " CYSTOSCOPY WITH URETHRAL BULKING AGENT;  Surgeon: Josh Hess M.D.;  Location: [facility];  Service: Urology    DC INS/RPL PRPH SAC/GSTR NPG/R Right 02/08/2022    Procedure: INSERTION, NEUROSTIMULATOR, SACRAL NERVE STIMULATOR;  Surgeon: Josh Hess M.D.;  Location: [facility];  Service: Urology    DC OPEN IMPLANT NEUROSTIMULATOR SACRAL N/A 01/26/2022    Procedure: INSERTION, ELECTRODE LEADS AND PULSE GENERATOR, NEUROSTIMULATOR, SACRAL - STAGE 1 LEAD PLACEMENT;  Surgeon: Josh Hess M.D.;  Location: [facility];  Service: Urology    TRANSCATHETER AORTIC VALVE REPLACEMENT  07/20/2020    Procedure: REPLACEMENT, AORTIC VALVE, TRANSCATHETER;  Surgeon: Mukesh Reid M.D.;  Location: [facility];  Service: Cardiac    NEHA  07/20/2020    Procedure: ECHOCARDIOGRAM, TRANSESOPHAGEAL-;  Surgeon: Mukesh Reid M.D.;  Location: [facility];  Service: Cardiac    LUNG BIOPSY OPEN Right 06/2020    LATISSIMUS FLAP Left 06/27/2017    Procedure: LATISSIMUS FLAP W/INSERTION OF IMPLANT;  Surgeon: Johnny Flannery M.D.;  Location: [facility];  Service:     BREAST IMPLANT REMOVAL Left 06/27/2017    Procedure: BREAST IMPLANT REMOVAL;  Surgeon: Johnny Flannery M.D.;  Location: [facility];  Service:     CAPSULECTOMY  04/13/2017    and debridement left breast    BOWEL RESECTION  12/28/2016    BREAST RECONSTRUCTION Bilateral 11/29/2016    Procedure: BREAST RECONSTRUCTION;  Surgeon: Johnny Flannery M.D.;  Location: [facility];  Service:     LATISSIMUS FLAP Right 11/29/2016    Procedure: LATISSIMUS FLAP W/ IMPLANT;  Surgeon: Johnny Flannery M.D.;  Location: [facility];  Service:     TISSUE EXPANDER PLACE/REMOVE Left 11/29/2016    Procedure: TISSUE EXPANDER REMOVE - REMOVAL AND INSERTION OF IMPLANT;  Surgeon: Johnny Flannery M.D.;  Location: [facility];  Service:     CAPSULECTOMY Bilateral  11/29/2016    Procedure: CAPSULECTOMY;  Surgeon: Johnny Flannery M.D.;  Location: [facility];  Service:     OTHER SURGICAL PROCEDURE Right 07/2016    debridement and breast expander placement    BREAST IMPLANT REMOVAL Right 01/06/2016    Procedure: BREAST IMPLANT REMOVAL, placement of drain;  Surgeon: Jon Lbelanc M.D.;  Location: [facility];  Service:     OTHER SURGICAL PROCEDURE Right 01/2016    second debridement right breast    CATH PLACEMENT Right 09/08/2015    Procedure: CATH PLACEMENT PORT;  Surgeon: Vargas Hyde M.D.;  Location: [facility];  Service:     OTHER SURGICAL PROCEDURE  09/08/2015    Port placement for chemo    MASTECTOMY MODIFIED RADICAL Left 08/10/2015    Procedure: MASTECTOMY MODIFIED RADICAL;  Surgeon: Vargas Hyde M.D.;  Location: [facility];  Service:     MASTECTOMY Right 08/10/2015    Procedure: MASTECTOMY SIMPLE;  Surgeon: Vargas Hyde M.D.;  Location: [facility];  Service:     NODE BIOPSY SENTINEL Left 08/10/2015    Procedure: NODE BIOPSY SENTINEL;  Surgeon: Vargas Hyde M.D.;  Location: [facility];  Service:     BREAST RECONSTRUCTION Bilateral 08/10/2015    Procedure: BREAST RECONSTRUCTION VIA;  Surgeon: Johnny Flannery M.D.;  Location: [facility];  Service:     TISSUE EXPANDER PLACE/REMOVE Bilateral 08/10/2015    Procedure: TISSUE EXPANDER PLACE/REMOVE & POSSIBLE ALLODERM;  Surgeon: Johnny Flannery M.D.;  Location: [facility];  Service:     ROTATOR CUFF REPAIR Right 2009    right    HIP ARTHROPLASTY TOTAL Right 2008    Hip Replacement, Total, right    HAND SURGERY Right 02/12/2007    joint Right Thumb    ABDOMINOPLASTY  10/28/2004    GASTRIC BYPASS LAPAROSCOPIC  10/10/2001    KNEE ARTHROPLASTY TOTAL Bilateral 01/24/2000    bilateral knees    OTHER  09/1999    tumor exc Right hip    INGRID BY LAPAROSCOPY  09/1997    BLADDER SUSPENSION  12/1993    HYSTERECTOMY,  "TOTAL ABDOMINAL  05/1993    rectal, bladder repair    EAR MIDDLE EXPLORATION Left 04/1990    Left    BREAST BIOPSY Left 06/1985    TUBAL LIGATION  03/1984    TONSILLECTOMY  1952    CATARACT EXTRACTION WITH IOL Bilateral     FOOT SURGERY  11/14/18    Left foot    GYN SURGERY      KNEE ARTHROSCOPY Left 1983, 12/97    Left    KNEE ARTHROSCOPY Right 6/88, 12/91, 11/97    Right    ORIF, ELBOW      ORIF, HIP  03/26/08    Right hip replacement    ORIF, KNEE  01/24/00    Both knees same day    OTHER CARDIAC SURGERY      OTHER SURGICAL PROCEDURE  4/06, 10/06    bilateral \"arm lift\"    OTHER SURGICAL PROCEDURE  5/97, 4/05/05    vaginal repair    SHOULDER SURGERY  06/03/09    Rotator cuff right shoulder    SLEEVE,ARLEEN VASO THIGH       Social History     Tobacco Use    Smoking status: Never     Passive exposure: Never    Smokeless tobacco: Never   Substance Use Topics    Alcohol use: Yes     Alcohol/week: 0.6 oz     Types: 1 Standard drinks or equivalent per week     Comment: Maybe once a week     Family and Occupational History     Socioeconomic History    Marital status:      Spouse name: Not on file    Number of children: Not on file    Years of education: Not on file    Highest education level: Not on file   Occupational History    Not on file       Lymphedema Objective      Left Upper Extremity Circumferential Measurements  Other: cm  Areola: cm  Breast Crease: cm  Axilla: 33.3 cm  Upper Proximal Humerus: 31.4 cm  Distal Humerus: 29.7 cm  Elbow: 25.2 cm  Mid-Forearm: 19.5 cm  Wrist: 14.8 cm  Distal Pierre Crease: 17 cm  Total: 170.9 cm    Right Upper Extremity Circumferential Measurements  Other: cm  Areola: cm  Breast Crease: cm  Axilla: 31.2 cm  Upper Proximal Humerus: 26.6 cm  Distal Humerus: 26.3 cm  Elbow: 23.3 cm  Mid-Forearm: 18.6 cm  Wrist: 14.5 cm  Distal Pierre Crease: 16.7 cm  Total: cm 157.2      Other Notes  29.4cm around the L mastectomy scar    42cm arm length        Therapeutic Treatments and " "Modalities:     Therapeutic Treatment and Modalities Summary: Educated patient on pathogenesis of lymphedema. Distributed brochure. Patient has also been educated on skin care precautions including hygiene, lotions with pH 5-5.5, and avoidance of lacerations/mosquito bites/heat. Also educated pt on signs/symptoms of cellulitis.     Patient was educated in regular, self MLD of her left breast and arm utilizing axillary lymph nodes on the right with associated pathways.  Correct strokes were emphasized and handout was provided.  Patient was invited to return for in clinic, skilled physical therapist administered MLD for which patient will set an additional appointment.       Discussed the need for external compression and educated pt regarding compression garment options.  Patient was educated the optimal use of garment (all day, every day, especially during higher risk activities as discussed, remove at night).  Lymphedema risk factors were discussed with a lymphedema \"Do's and Don'ts\" handout provided.  Patient verbalized understanding to all education today.     Time-based treatments/modalities:           Assessment and Plan:   Functional Impairments: abnormal/decreased or restricted ROM, lacks appropriate home exercise program and swelling    Assessment details:  Tiffany is a 78 yo F who underwent treatment for L HER2+ breast cancer around ten years ago which included a bilateral total mastectomy with reconstruction, axillary dissection on the left, and radiation. She arrives today with report of discomfort to her L breast as well as fullness to the L UE. She is unsure when the fullness to the L UE began but seems that it is gradually worsening. Her L breast appears to have been a subpectoral reconstruction and although it is asymmetrical to the R and  raised superiorly, question if this deformity is related to radiation versus fluid accumulation. Did request she trial a sports bra to add compression and evaluate " for change. Her L UE is proximally more full than her R. Will initiate modified CDT to assist with fluid removal from the L UE. She will require skilled intervention to achieve decongestion and reduction of discomfort.   Prognosis: good      Goals:   Short Term Goals:  1. Patient will obtain a compression garment for her L breast and L UE to allow for fluid removal from the left upper quadrant.  2. Pt will achieve a total limb circumference reduction of 5cm in order to decrease risk for infection and progression of disease process.  3. Pt will be independent with self-MLD to facilitate fluid migration to healthy tissues and lymph nodes.   4. Pt will consistently perform exercises with compression on to facilitate muscle pump of fluid from affected extremity.       Short term goal time span:  2-4 weeks    Long Term Goals:  1. Pt will have a reduction in total limb circumference of 10cm in order to decrease risk for infection and progression of disease process.   2. Patient will be independent with maintenance phase management of lymphedema including: compression garments, skin care education, risk reduction strategies, manual lymphatic drainage, and therapeutic exercise.   Long term goal time span:  4-6 weeks    Plan:  Therapy options:  Physical therapy treatment to continue  Planned therapy interventions:  Caregiver education, compression bandaging, compression glove, compression sleeve, decongestive exercises, home exercise program, intermittent compression, manual lymph drainage, Velcro wraps, strengthening exercises, soft tissue manual techniques (CPT 12619), skin/wound care, short stretch bandages, sequential compression pump, self-care/training (CPT 19157), range of motion exercises, postural exercises, patient education, orthotic measurements/fitting and myofascial release techniques  Planned education:  Functional anatomy and physiology of the lymphatic system, pathophysiology of lymphedema, lymphedema  exercise, lymphedema precautions, proper skin care/nutrition, self massage, compression bandaging, infection prevention, scar tissue management, breast cancer rehab, activity guidelines, dietary guidelines, skin care guidelines, home pump use, bandage removal and long term self-management of lymphedema  Frequency:  1x week  Duration in weeks:  8  Discussed with:  Patient      Functional Assessment Used    LLIS    Referring provider co-signature:  I have reviewed this plan of care and my co-signature certifies the need for services.    Certification Period: 01/21/2025 to  03/19/25    Physician Signature: ________________________________ Date: ______________

## 2025-01-23 ENCOUNTER — APPOINTMENT (OUTPATIENT)
Dept: PHYSICAL THERAPY | Facility: REHABILITATION | Age: 80
End: 2025-01-23
Attending: INTERNAL MEDICINE
Payer: MEDICARE

## 2025-01-28 ENCOUNTER — APPOINTMENT (OUTPATIENT)
Dept: PHYSICAL THERAPY | Facility: REHABILITATION | Age: 80
End: 2025-01-28
Attending: INTERNAL MEDICINE
Payer: MEDICARE

## 2025-01-30 ENCOUNTER — PHYSICAL THERAPY (OUTPATIENT)
Dept: PHYSICAL THERAPY | Facility: REHABILITATION | Age: 80
End: 2025-01-30
Attending: INTERNAL MEDICINE
Payer: MEDICARE

## 2025-01-30 DIAGNOSIS — I89.0 LYMPHEDEMA: ICD-10-CM

## 2025-01-30 PROCEDURE — 97110 THERAPEUTIC EXERCISES: CPT

## 2025-01-30 PROCEDURE — 97140 MANUAL THERAPY 1/> REGIONS: CPT

## 2025-01-30 NOTE — OP THERAPY DAILY TREATMENT
Outpatient Physical Therapy  LYMPHEDEMA THERAPY DAILY TREATMENT     [facility] Physical Therapy [address]  [address]  [place] [address]  Phone:  [phone]  Fax:  [phone]    Date: 01/30/2025    Patient: Tiffany Louis  YOB: 1945  MRN: [id number]     Time Calculation    Start time: 1415  Stop time: 1457 Time Calculation (min): 42 minutes         Chief Complaint: Shoulder Problem and Lymphedema Breast Cancer    Visit #: 2    Subjective:   History of Present Illness:     Mechanism of injury:  Doing about the same. L UE itchy.       Lymphedema Objective        Therapeutic Exercises (CPT 07905):     1. shoulder horizontal Abduction against gravity, x10, added to HEP with handout    2. open book, x10    3. swimmers, x10 each    4. wall walk with lift off, x10, added to HEP with handout    Therapeutic Treatments and Modalities:     1. Manual Therapy (CPT 34522)    Therapeutic Treatment and Modalities Summary:   -Trigger point release to L: forearm extensors, biceps, pec major, pec minor, subscap, supraspinatus  -L joint mobilization to radius/ulna for interossei, wrist, CMC  -MLD to L breast including parasternal and intercostal collectors.    -MLD to L UE     Time-based treatments/modalities:    Physical Therapy Timed Treatment Charges  Manual therapy minutes (CPT 12094): 32 minutes  Therapeutic exercise minutes (CPT 46421): 10 minutes      Assessment and Plan:   Assessment details:  Tiffany's L breast appears visually the same as evaluation despite use of compression bra day and night. Suspect that her L breast implant is possibly drawing her pectorals inferiorly. Will measure next session to determine if this is true numerically. Question if perhaps her L UE is larger due to multiple surgeries to her R UE, causing a muscle mass differential. Regardless, will encourage MLD and exercise to assist with fluid movement and will assess numbers next week.     Plan:  Therapy options:   Physical therapy treatment to continue  Discussed with:  Patient

## 2025-02-04 ENCOUNTER — APPOINTMENT (OUTPATIENT)
Dept: PHYSICAL THERAPY | Facility: REHABILITATION | Age: 80
End: 2025-02-04
Attending: INTERNAL MEDICINE
Payer: MEDICARE

## 2025-02-06 ENCOUNTER — APPOINTMENT (OUTPATIENT)
Dept: PHYSICAL THERAPY | Facility: REHABILITATION | Age: 80
End: 2025-02-06
Attending: INTERNAL MEDICINE
Payer: MEDICARE

## 2025-02-11 ENCOUNTER — PHYSICAL THERAPY (OUTPATIENT)
Dept: PHYSICAL THERAPY | Facility: REHABILITATION | Age: 80
End: 2025-02-11
Attending: INTERNAL MEDICINE
Payer: MEDICARE

## 2025-02-11 DIAGNOSIS — I89.0 LYMPHEDEMA: ICD-10-CM

## 2025-02-11 PROCEDURE — 97535 SELF CARE MNGMENT TRAINING: CPT

## 2025-02-11 PROCEDURE — 97140 MANUAL THERAPY 1/> REGIONS: CPT

## 2025-02-11 NOTE — OP THERAPY DAILY TREATMENT
Outpatient Physical Therapy  LYMPHEDEMA THERAPY DAILY TREATMENT     [facility] Physical Therapy [address].  [address]  [place] [address]  Phone:  [phone]  Fax:  [phone]    Date: 02/11/2025    Patient: Tiffany Louis  YOB: 1945  MRN: [id number]     Time Calculation    Start time: 1330  Stop time: 1410 Time Calculation (min): 40 minutes         Chief Complaint: Arm Swelling    Visit #: 3    Subjective:   History of Present Illness:     Mechanism of injury:  Pt reporting her L UE has reduced. Breast seems the same.       Lymphedema Objective      Left Upper Extremity Circumferential Measurements  Other: cm  Areola: cm  Breast Crease: cm  Axilla: 33.3 cm  Upper Proximal Humerus: 31.9 cm  Distal Humerus: 29.9 cm  Elbow: 25.1 cm  Mid-Forearm: 20.7 cm  Wrist: 14.3 cm  Distal Pierre Crease: 17.2 cm  Total: 172.4 cm      Left Upper Extremity Circumferential Measurements  Other: cm  Areola: cm  Breast Crease: cm  Axilla: 33.3 cm  Upper Proximal Humerus: 31.4 cm  Distal Humerus: 29.7 cm  Elbow: 25.2 cm  Mid-Forearm: 19.5 cm  Wrist: 14.8 cm  Distal Pierre Crease: 17 cm  Total: 170.9 cm     Right Upper Extremity Circumferential Measurements  Other: cm  Areola: cm  Breast Crease: cm  Axilla: 31.2 cm  Upper Proximal Humerus: 26.6 cm  Distal Humerus: 26.3 cm  Elbow: 23.3 cm  Mid-Forearm: 18.6 cm  Wrist: 14.5 cm  Distal Pierre Crease: 16.7 cm  Total: cm 157.2        Therapeutic Exercises (CPT 69162):     1. shoulder horizontal Abduction against gravity, HEP    2. open book, HEP    3. swimmers, HEP    4. wall walk with lift off, HEP    5. shoulder ER with orange band, x10, added to HEP with handout    6. shoulder low abduction from hip with orange ban, x10, added to HEP with handout    Therapeutic Treatments and Modalities:     1. Self Care ADL Training (CPT 75509), Instructed pt on how to don her L UE Ready Wrap. She forgot to bring it but utilized a picture of the wrap and discussed the  color code.    2. Manual Therapy (CPT 23384)    Therapeutic Treatment and Modalities Summary:   -Trigger point release to L: forearm extensors, biceps, pec major, pec minor, subscap, supraspinatus  -L joint mobilization to radius/ulna for interossei, wrist, CMC  -MLD to L breast including parasternal and intercostal collectors.    -MLD to L UE; fibrotic techniques to L forearm    Time-based treatments/modalities:    Physical Therapy Timed Treatment Charges  Functional training, self care minutes (CPT 69499): 10 minutes  Manual therapy minutes (CPT 75154): 30 minutes      Assessment and Plan:   Assessment details:  Tiffany had some pocketing of lymphatic fibrotic fluid to her L forearm, just distal to her elbow. She will benefit from further intervention to assist with fluid movement and decongestion.    Plan:  Therapy options:  Physical therapy treatment to continue  Discussed with:  Patient

## 2025-02-13 ENCOUNTER — APPOINTMENT (OUTPATIENT)
Dept: PHYSICAL THERAPY | Facility: REHABILITATION | Age: 80
End: 2025-02-13
Attending: INTERNAL MEDICINE
Payer: MEDICARE

## 2025-03-12 ENCOUNTER — TELEPHONE (OUTPATIENT)
Dept: CARDIOLOGY | Facility: MEDICAL CENTER | Age: 80
End: 2025-03-12
Payer: MEDICARE

## 2025-03-12 NOTE — TELEPHONE ENCOUNTER
"Last OV: 9/5/2024  Proposed Surgery: Colonoscopy with Deep Propofol Sedation  Surgery Date: 4/20/2025  Requesting Office Name: [facility]  Fax Number: [phone]  Preference of Location (default is surgery center unless specified by Cardiologist or LATOSHA)  Prior Clearance Addressed:No     Is this a general clearance? YES   Anticoags/Antiplatelets: Other N/A  Anticoags/Antiplatelet managed by Cardiology? YES    Outstanding Cardiac Imaging : Yes, pt did late cancellation  Echo.   Clearance to provider to review  Ablation, Cardioversion, Stent, Cardiac Devices, Catheterization, Watchman: No  TAVR/Valve, Mitral Clip, Watchman (including open heart),: Completed over 6 months post procedure- Send clearance letter    Recent Cardiac Hospitalization: No            When: N/A  History (cardiac history):   Past Medical History:   Diagnosis Date    Anemia     Anesthesia     \"very low blood pressure\" Low oxygen    Aortic valve stenosis, severe 07/20/2020    Aortic valve replacement    Arrhythmia     Arthritis     osteo, \"all over\"    Bilateral carotid artery stenosis     Breast cancer (HCC) 07/2015    left    Breath shortness     from aortic stenosis; much improved since aortic valve replacement    Cancer (HCC)     skin    Cancer (HCC) 2015    breast, Left    Cataract     Bilateral IOL    Delayed emergence from general anesthesia     Dental disorder     upper implants front teeth    Depression     Essential hypertension     GERD (gastroesophageal reflux disease)     Hard to intubate     10/1/2024 pt denies    Heart disease     Heart murmur     History of left heart catheterization - normal 2020 prior to TAVR     Hypertension     medicated    Joint replacement     bilateral knees and right hip    Nasal drainage     Obesity     Osteoporosis     Psychiatric problem     anxiety/depression    Renal disorder 06/11/2020    kidney stone    Restless leg syndrome     Sleep apnea     CPAP    Snoring     sleep study done    Umbilical " "hernia 10/2016    Unspecified urinary incontinence     Urinary bladder disorder 06/21/2017    reports freq infections but none in the past year; on ABX, + bacteria in \"gut\"; last UTI 10/2021           Is this a dental clearance? NO  Ablation, Cardioversion, Watchman, Stents, Cath, Devices within the last 3 months? No   If yes- Send dental wait letter, do not forward to provider for review.     TAVR / Valve, Mitral clip within the last 6 months? No  If yes- Send dental wait letter, do not forward to provider for review.     If completing a general clearance, continue per protocol.           Surgical Clearance Letter Sent: No Provider to advise.   **Scan clearance request letter into [facility].**    "

## 2025-03-12 NOTE — LETTER
"PROCEDURE/SURGERY CLEARANCE FORM      Encounter Date: 3/12/2025    Patient: Tiffany Louis  YOB: 1945    CARDIOLOGIST:  ELIO Chaves   REFERRING DOCTOR:  No ref. provider found    The following procedure/surgery: Colonoscopy with Deep Propofol Sedation                                            Additional comments: \"Patient is low risk for low to moderate risk procedures. She can proceed with colonoscopy if she does not have any new symptoms since last follow up. Patient does need to reschedule echocardiogram for TAVR surveillance.\" -    PROCEDURE/SURGERY CLEARANCE FORM    Date: 3/13/2025   Patient Name: Tiffany Louis    Dear Surgeon or Proceduralist,      Thank you for your request for cardiac stratification of our mutual patient Tiffany Louis 1945. We have reviewed their [facility] records; and to the best of our understanding this patient has not had stenting, ablation, watchman, cardiothoracic surgery or hospitalization for cardiovascular reasons in the past 6 months.  Tiffany Louis has been seen within the past 15 months and is considered to have non-modifiable cardiac risk for this low-risk procedure/surgery. They may proceed from a cardiovascular standpoint and may hold their antiplatelet/anticoagulation as briefly as possible. Please have patient resume this medication when hemodynamically stable to do so.     Aspirin or Prasugrel   - hold 7 days prior to procedure/surgery, resume when hemodynamically stable      Clopidrogrel or Ticagrelor  - hold 7 days for all neurological procedures, hold 5 days prior to all other procedure/surgery,  resume when hemodynamically stable     Warfarin - hold 7 days for all neurological procedures, hold 5 days prior to all other procedure/surgery and coordinate with [facility] ([phone]) INR testing and dose management.      Pradaxa/Xarelto/Eliquis/Savesya - hold 1 day prior to procedure for low bleeding risk " procedure, 2 days for high bleeding risk procedure, or consider holding 3 days or longer for patients with reduced kidney function (CrCl <30mL/min) or spinal/cranial surgeries/procedures.      If they have a mechanical heart valve, please coordinate with [facility] ([phone]) the proper management of their anticoagulant in the periprocedural or perioperative period.      Some patients have higher risk for cardiovascular complications or holding medication. If our patient has had prior complications of holding antiplatelet or anticoagulants in the past and we have seen them after these events, we have addressed these concerns with the patient. They are at an unknown degree of increased risk for recurrent complication.  You may hold anticoagulation/antiplatelets for the procedure or surgery if the benefits of the procedure or surgery outweigh this nonmodifiable risk.      If Tiffany Louis 1945 has new symptoms of heart failure decompensation, unstable arrythmia, or angina please reach out and we will assess the patient.      If you have other patient-specific concerns, please feel free to reach out to the patient's cardiologist directly at [phone].     Thank you,       [facility]           Electronically Signed    MD Signature   ELIO Chaves

## 2025-03-13 ENCOUNTER — APPOINTMENT (OUTPATIENT)
Dept: PHYSICAL THERAPY | Facility: REHABILITATION | Age: 80
End: 2025-03-13
Attending: INTERNAL MEDICINE
Payer: MEDICARE

## 2025-03-13 NOTE — TELEPHONE ENCOUNTER
"\"Patient is low risk for low to moderate risk procedures. She can proceed with colonoscopy if she does not have any new symptoms since last follow up. Patient does need to reschedule echocardiogram for TAVR surveillance.\" -HL    Letter sent.      "

## 2025-03-17 ENCOUNTER — TELEPHONE (OUTPATIENT)
Dept: CARDIOLOGY | Facility: MEDICAL CENTER | Age: 80
End: 2025-03-17
Payer: MEDICARE

## 2025-04-08 ENCOUNTER — APPOINTMENT (OUTPATIENT)
Dept: PHYSICAL THERAPY | Facility: REHABILITATION | Age: 80
End: 2025-04-08
Attending: INTERNAL MEDICINE
Payer: MEDICARE

## 2025-05-15 ENCOUNTER — APPOINTMENT (OUTPATIENT)
Dept: PHYSICAL THERAPY | Facility: REHABILITATION | Age: 80
End: 2025-05-15
Payer: MEDICARE

## (undated) DEVICE — SOD. CHL. INJ. 0.9% 1000 ML - (14EA/CA 60CA/PF)

## (undated) DEVICE — SUTURE 4-0 30CM STRATAFIX SPIRAL PS-2 (12EA/BX)

## (undated) DEVICE — SUTURE 0 ETHIBOND MO6 C/R - (12/BX) 8-18 INCH ETHICON

## (undated) DEVICE — Device

## (undated) DEVICE — SPONGE GAUZESTER. 2X2 4-PL - (2/PK 50PK/BX 30BX/CS)

## (undated) DEVICE — SYRINGE 10 ML CONTROL LL (25EA/BX 4BX/CA)

## (undated) DEVICE — PADDING CAST 6 IN STERILE - 6 X 4 YDS (24/CA)

## (undated) DEVICE — STOPCOCK 3-WAY W/SWIVEL LEVER LOCK (50EA/CA)

## (undated) DEVICE — CANISTER SUCTION 3000ML MECHANICAL FILTER AUTO SHUTOFF MEDI-VAC NONSTERILE LF DISP  (40EA/CA)

## (undated) DEVICE — POLY UMBILICAL TAPE 1/8X30 - (36/BX)

## (undated) DEVICE — WATER IRRIGATION STERILE 1000ML (12EA/CA)

## (undated) DEVICE — CONTAINER, SPECIMEN, STERILE

## (undated) DEVICE — CONNECTOR HOSE NEPTUNE FOR CYSTO ROOM

## (undated) DEVICE — INTRODUCER CATHETER  DILATOR PROTRUDING 8FR 2.5CM (10EA/BX)

## (undated) DEVICE — GOWN SURGEONS X-LARGE - DISP. (30/CA)

## (undated) DEVICE — WIRE GUIDE LUNDQST.035X180 - TSMG-35-180-4-LES ORDER BY BOX (5EA/BX)

## (undated) DEVICE — SUTURE 5-0 PROLENE RB-1 D/A 36 (36PK/BX)"

## (undated) DEVICE — COVER LIGHT HANDLE FLEXIBLE - SOFT (2EA/PK 80PK/CA)

## (undated) DEVICE — PROTECTOR ULNA NERVE - (36PR/CA)

## (undated) DEVICE — SUCTION INSTRUMENT YANKAUER BULBOUS TIP W/O VENT (50EA/CA)

## (undated) DEVICE — EXTERNAL NEUROSTIMULATOR

## (undated) DEVICE — COVER LIGHT HANDLE ALC PLUS DISP (18EA/BX)

## (undated) DEVICE — TRAY SRGPRP PVP IOD WT PRP - (20/CA)

## (undated) DEVICE — GLOVE BIOGEL SZ 6.5 SURGICAL PF LTX (50PR/BX 4BX/CA)

## (undated) DEVICE — GLOVE BIOGEL PI INDICATOR SZ 7.5 SURGICAL PF LF -(50/BX 4BX/CA)

## (undated) DEVICE — NEPTUNE 4 PORT MANIFOLD - (20/PK)

## (undated) DEVICE — KIT ROOM DECONTAMINATION

## (undated) DEVICE — DRAPE LARGE 3 QUARTER - (20/CA)

## (undated) DEVICE — DRAPE STRLE REG TOWEL 18X24 - (10/BX 4BX/CA)"

## (undated) DEVICE — GOWN WARMING STANDARD FLEX - (30/CA)

## (undated) DEVICE — CONTAINER SPECIMEN BAG OR - STERILE 4 OZ W/LID (100EA/CA)

## (undated) DEVICE — DRESSING TRANSPARENT FILM TEGADERM 2.375 X 2.75"  (100EA/BX)"

## (undated) DEVICE — MASK ANESTHESIA ADULT  - (100/CA)

## (undated) DEVICE — DRAIN FLAT SUCTION 10MMX20CM - LATEX FREE (10EA/CA)

## (undated) DEVICE — ELECTRODE DUAL RETURN W/ CORD - (50/PK)

## (undated) DEVICE — SUTURE 3-0 VICRYL PLUS SH - 27 INCH (36/BX)

## (undated) DEVICE — PEN SKIN MARKER W/RULER - (50EA/BX)

## (undated) DEVICE — ELECTRODE 850 FOAM ADHESIVE - HYDROGEL RADIOTRNSPRNT (50/PK)

## (undated) DEVICE — CATHETER FOLEY ROBINSON 10FR 16IN STRL (12EA/CA)

## (undated) DEVICE — SYRINGE DISP. 60 CC LL - (30/BX, 12BX/CA)**WHEN THESE ARE GONE ORDER #500206**

## (undated) DEVICE — DRESSING PETROLEUM GAUZE 5 X 9" (50EA/BX 4BX/CA)"

## (undated) DEVICE — SENSOR OXIMETER ADULT SPO2 RD SET (20EA/BX)

## (undated) DEVICE — DRAPE CLEAR W/ELASTIC BAND RAD CARM 40 X40" (20EA/CA)"

## (undated) DEVICE — IMPLANT BREAST MP PLUS SIZER 550CC

## (undated) DEVICE — SUTURE GENERAL

## (undated) DEVICE — DRESSING TRANSPARENT FILM TEGADERM 4 X 4.75" (50EA/BX)"

## (undated) DEVICE — SLEEVE, VASO, THIGH, MED

## (undated) DEVICE — LACTATED RINGERS INJ 1000 ML - (14EA/CA 60CA/PF)

## (undated) DEVICE — BOVIE BLADE COATED - (50/PK)

## (undated) DEVICE — SUTURE 4-0 MONOCRYL PLUS PS-2 - 27 INCH (36/BX)

## (undated) DEVICE — GLOVE BIOGEL PI INDICATOR SZ 8.0 SURGICAL PF LF -(50/BX 4BX/CA)

## (undated) DEVICE — SET LEADWIRE 5 LEAD BEDSIDE DISPOSABLE ECG (1SET OF 5/EA)

## (undated) DEVICE — SPONGE DRAIN 4 X 4IN 6-PLY - (2/PK25PK/BX12BX/CS)

## (undated) DEVICE — SYRINGE SAFETY 10 ML 18 GA X 1 1/2 BLUNT LL (100/BX 4BX/CA)

## (undated) DEVICE — SODIUM CHL. INJ. 0.9% 500ML (24EA/CA 50CA/PF)

## (undated) DEVICE — PAD LAP STERILE 18 X 18 - (5/PK 40PK/CA)

## (undated) DEVICE — TOWEL STOP TIMEOUT SAFETY FLAG (40EA/CA)

## (undated) DEVICE — SYSTEM DELIVERY COMMANDER TAVR KIT 23MM COMPONENT (1EA)

## (undated) DEVICE — DRAPE SURGICAL U 77X120 - (10/CA)

## (undated) DEVICE — SUCTION INSTRUMENT YANKAUER OPEN TIP W/O VENT (50EA/CA)

## (undated) DEVICE — WIRE GUIDE AES .035 260CM WITH 3MM J TIP"

## (undated) DEVICE — DRAPE MAYO STAND - (30/CA)

## (undated) DEVICE — SET EXTENSION WITH 2 PORTS (48EA/CA) ***PART #2C8610 IS A SUBSTITUTE*****

## (undated) DEVICE — SET FLUID WARMING STANDARD FLOW - (10/CA)

## (undated) DEVICE — CATHETER PIGTAIL 6FR 145 (5EA/BX)

## (undated) DEVICE — TUBING CLEARLINK DUO-VENT - C-FLO (48EA/CA)

## (undated) DEVICE — STERI STRIP COMPOUND BENZOIN - TINCTURE 0.6ML WITH APPLICATOR (40EA/BX)

## (undated) DEVICE — SPONGE GAUZESTER 4 X 4 4PLY - (128PK/CA)

## (undated) DEVICE — KIT ANESTHESIA W/CIRCUIT & 3/LT BAG W/FILTER (20EA/CA)

## (undated) DEVICE — GLOVE BIOGEL INDICATOR SZ 8 SURGICAL PF LTX - (50/BX 4BX/CA)

## (undated) DEVICE — GLOVE BIOGEL SZ 7.5 SURGICAL PF LTX - (50PR/BX 4BX/CA)

## (undated) DEVICE — SUTURE 5 SURGICAL STEEL V-40 - (12/BX) CCS CURRENT

## (undated) DEVICE — CATHETER 6FR AL1 100CM (5/BX)

## (undated) DEVICE — IV SET, NON-VENT PLUMB PUMP

## (undated) DEVICE — BLADE SURGICAL #11 - (50/BX)

## (undated) DEVICE — HEAD HOLDER JUNIOR/ADULT

## (undated) DEVICE — CHLORAPREP 26 ML APPLICATOR - ORANGE TINT(25/CA)

## (undated) DEVICE — DEVICE CLOSER PERCLOSE - (10/BX) PROGLIDE SYSTEM

## (undated) DEVICE — SODIUM CHL IRRIGATION 0.9% 1000ML (12EA/CA)

## (undated) DEVICE — CLIP MED INTNL HRZN TI ESCP - (25/BX)

## (undated) DEVICE — GLOVE BIOGEL PI ORTHO SZ 6 SURGICAL PF LF (40PR/BX)

## (undated) DEVICE — STAPLER SKIN DISP - (6/BX 10BX/CA) VISISTAT

## (undated) DEVICE — GLOVE SURGICAL PROTEXIS PI 8.0 LF - (50PR/BX)

## (undated) DEVICE — SLEEVE VASO CALF MED - (10PR/CA)

## (undated) DEVICE — BLADE SURGICAL #10 - (50/BX)

## (undated) DEVICE — TUBE CONNECTING SUCTION - CLEAR PLASTIC STERILE 72 IN (50EA/CA)

## (undated) DEVICE — BLADE SURGICAL #15 - (50/BX 3BX/CA)

## (undated) DEVICE — GUIDEWIRE STARTER STRAIGHT FIXED CORE .035 150CM 4 STRAIGHT PTFE/HEPARIN COATED (10/BX)

## (undated) DEVICE — SYS DLV COST CLS RM TEMP - INJECTATE (CO-SET II) (10EA/CA)

## (undated) DEVICE — BLANKET UNDERBODY FULL ACCES - (5/CA)

## (undated) DEVICE — GUIDEWIRE STARTER J CURVED FIXED CORE .035 260CM 10 3MM PTFE/HEPARIN COATED (5EA/BX)

## (undated) DEVICE — BAG URODRAIN WITH TUBING - (20/CA)

## (undated) DEVICE — STOPCOCK IV 400 PSI 3W ROT (50EA/BX)

## (undated) DEVICE — DRAPE C-ARM LARGE 41IN X 74 IN - (10/BX 2BX/CA)

## (undated) DEVICE — GLOVE BIOGEL SZ 7 SURGICAL PF LTX - (50PR/BX 4BX/CA)

## (undated) DEVICE — DRAPE 36X28IN RAD CARM BND BG - (25/CA) O

## (undated) DEVICE — SUTURE 3-0 MONOCRYL PLUS PS-2 - (12/BX)

## (undated) DEVICE — COVER FOOT UNIVERSAL DISP. - (25EA/CA)

## (undated) DEVICE — SUTURE 4-0 VICRYL PLUS SH - UNDYED 27 INCH (36PK/BX)

## (undated) DEVICE — KIT ULTRASND COVER - (20EA/CA)

## (undated) DEVICE — ARMBOARD  SMALL IV 9 INLONG - (25EA/CA)

## (undated) DEVICE — PACK MAJOR BASIN - (2EA/CA)

## (undated) DEVICE — PACK LOWER EXTREMITY - (2/CA)

## (undated) DEVICE — GLOVE BIOGEL ECLIPSE PF LATEX SIZE 8.0 (50PR/BX)

## (undated) DEVICE — SUTURE OHS

## (undated) DEVICE — TRAY SKIN SCRUB PVP WET (20EA/CA) PART #DYND70356 DISCONTINUED

## (undated) DEVICE — GLOVE BIOGEL INDICATOR SZ 6.5 SURGICAL PF LTX - (50PR/BX 4BX/CA)

## (undated) DEVICE — DRAPE LAPAROTOMY T SHEET - (12EA/CA)

## (undated) DEVICE — GLOVE BIOGEL PI ULTRATOUCH SZ 8.5 SURGICAL PF LF - (200PR/CA)

## (undated) DEVICE — DETERGENT RENUZYME PLUS 10 OZ PACKET (50/BX)

## (undated) DEVICE — HEADREST PRONEVIEW LARGE - (10/CA)

## (undated) DEVICE — DRAIN J-VAC 10MM FLAT - (10/CA)

## (undated) DEVICE — SUTURE 2-0 VICRYL PLUS CT-1 - 8 X 18 INCH(12/BX)

## (undated) DEVICE — KIT TOURNIQUET DLP (40EA/PK)

## (undated) DEVICE — SUTURE 2-0 VICRYL PLUS CT-1 36 (36PK/BX)"

## (undated) DEVICE — GLOVE SZ 6.5 BIOGEL PI MICRO - PF LF (50PR/BX)

## (undated) DEVICE — APPLIER OPEN MEDIUM CLIP (6EA/BX)

## (undated) DEVICE — SUTURE 3-0 ETHILON FS-1 - (36/BX) 30 INCH

## (undated) DEVICE — BANDAGE ELASTIC 4 IN X 5 YDS - LATEX FREE(10/BX 5BX/CA)

## (undated) DEVICE — RESERVOIR SUCTION 100 CC - SILICONE (20EA/CA)

## (undated) DEVICE — SET BIFURCATED BLOOD - (48EA/CS)

## (undated) DEVICE — GOWN SURGEONS LARGE - (32/CA)

## (undated) DEVICE — DECANTER FLD BLS - (50/CA)

## (undated) DEVICE — BLADE SURGICAL CLIPPER - (50EA/CA)

## (undated) DEVICE — DERMABOND ADVANCED - (12EA/BX)

## (undated) DEVICE — ELECTRODE RADIOLUCNT SOLID GEL DEFIB PADS (12EA/CA)

## (undated) DEVICE — CANNULA O2 COMFORT SOFT EAR ADULT 7 FT TUBING (50/CA)

## (undated) DEVICE — SUTURE 4-0 MONOCRYL PLUS PS-1 - 27 INCH (36/BX)

## (undated) DEVICE — CANISTER SUCTION RIGID RED 1500CC (40EA/CA)

## (undated) DEVICE — SET MULTI-ADD FLUID TRANSFER 42 INCH - (50/CA)THIS IS A CUSTOM MADE ITEM 4 TO 6 WEEK LEAD TIME

## (undated) DEVICE — SENSOR SPO2 NEO LNCS ADHESIVE (20/BX) SEE USER NOTES

## (undated) DEVICE — CLOSURE SKIN STRIP 1/2 X 4 IN - (STERI STRIP) (50/BX 4BX/CA)

## (undated) DEVICE — PACK MINOR BASIN - (2EA/CA)

## (undated) DEVICE — MICRODRIP PRIMARY VENTED 60 (48EA/CA) THIS WAS PART #2C8428 WHICH WAS DISCONTINUED

## (undated) DEVICE — DRESSING ABDOMINAL PAD STERILE 8 X 10" (360EA/CA)"

## (undated) DEVICE — WATER IRRIG. STER 3000 ML - (4/CA)

## (undated) DEVICE — BAG, SPONGE COUNT 50600

## (undated) DEVICE — GLOVE BIOGEL PI INDICATOR SZ 7.0 SURGICAL PF LF - (50/BX 4BX/CA)

## (undated) DEVICE — GLOVE BIOGEL INDICATOR SZ 7SURGICAL PF LTX - (50/BX 4BX/CA)

## (undated) DEVICE — SUTURE 3-0 VICRYL PLUS SH - 8X 18 INCH (12/BX)

## (undated) DEVICE — KIT RADIAL ARTERY 20GA W/MAX BARRIER AND BIOPATCH  (5EA/CA) #10740 IS FOR THE SET RADIAL ARTERIAL

## (undated) DEVICE — DRAPE C ARMOR (12EA/CA)

## (undated) DEVICE — PACK CYSTO III (2EA/CA)

## (undated) DEVICE — TRANSDUCER BIFURCATED MONITORING KIT (10EA/CA)

## (undated) DEVICE — HUMID-VENT HEAT AND MOISTURE EXCHANGE- (50/BX)

## (undated) DEVICE — SWAB ANAEROBIC SPEC.COLLECTOR - (25/PK 4PK/CA 100EA/CA)

## (undated) DEVICE — NEEDLE NON SAFETY HYPO 22 GA X 1 1/2 IN (100/BX)

## (undated) DEVICE — SLEEVE VASO DVT COMPRESSION CALF MED - (10PR/CA)

## (undated) DEVICE — KIT  I.V. START (100EA/CA)

## (undated) DEVICE — ELECTRODE DFBR ADLT 6.5X5IN (12PR/CA) *8900-4003 PART # FOR CA QTY. PART #8900-4004 IS EACH QTY

## (undated) DEVICE — DRAPE CHEST/BREAST - (12EA/CA)

## (undated) DEVICE — CLIP SM INTNL HRZN TI ESCP LGT - (24EA/PK 25PK/BX)

## (undated) DEVICE — CANISTER SUCTION 3000ML MECHANICAL FILTER AUTO SHUTOFF MEDI-VAC NONSTERILE LF DISP (40EA/CA)

## (undated) DEVICE — INTRODUCER SHEATH 6FR 2.5CM - DILATOR PROTRUDING (10/BX)

## (undated) DEVICE — PACK TAVR (3EA/CA)

## (undated) DEVICE — BAG RESUSCITATION DISPOSABLE - WITH MASK (10 EA/CA)

## (undated) DEVICE — PENCIL ELECTSURG 10FT HLSTR - WITH BLADE (50EA/CA)

## (undated) DEVICE — SUTURE 4-0 ETHILON FS-1 18 N (36PK/BX)

## (undated) DEVICE — MASK OXYGEN VNYL ADLT MED CONC WITH 7 FOOT TUBING - (50EA/CA)

## (undated) DEVICE — GLOVE BIOGEL PI INDICATOR SZ 6.5 SURGICAL PF LF - (50/BX 4BX/CA)

## (undated) DEVICE — PACK BREAST SM OR - (1EA/CA)

## (undated) DEVICE — ARMREST CRADLE FOAM - (2PR/PK 12PR/CA)

## (undated) DEVICE — TUBING PRSS MNTR 72IN M/ M LL - (25/BX) MONIT. LINE W/MALE L/L

## (undated) DEVICE — GLOVE, LITE (PAIR)

## (undated) DEVICE — CABLE TEMPORARY PACING

## (undated) DEVICE — TUBE CONNECT SUCTION CLEAR 120 X 1/4" (50EA/CA)"

## (undated) DEVICE — DRESSING 3X8 ADAPTIC GAUZE - NON-ADHERING (36/PK 6PK/BX)

## (undated) DEVICE — GLOVE BIOGEL PI ULTRATOUCH SZ 6.5 SURGICAL PF LF - (50/BX)

## (undated) DEVICE — NEEDLE PERCUTANEOUS THINWALL 7CM 18GA BSDN 18-7.0

## (undated) DEVICE — SUTURE 2-0 SILK SH 24 (36PK/BX)"

## (undated) DEVICE — COVER PROBE INTRAOPERATIVE KIT (10EA/CA)